# Patient Record
Sex: FEMALE | Race: WHITE | NOT HISPANIC OR LATINO | URBAN - METROPOLITAN AREA
[De-identification: names, ages, dates, MRNs, and addresses within clinical notes are randomized per-mention and may not be internally consistent; named-entity substitution may affect disease eponyms.]

---

## 2017-01-15 ENCOUNTER — INPATIENT (INPATIENT)
Facility: HOSPITAL | Age: 82
LOS: 2 days | Discharge: NURSING, REHAB OR LONG-TERM CARE | DRG: 470 | End: 2017-01-18
Attending: INTERNAL MEDICINE | Admitting: INTERNAL MEDICINE
Payer: MEDICARE

## 2017-01-15 ENCOUNTER — RESULT REVIEW (OUTPATIENT)
Age: 82
End: 2017-01-15

## 2017-01-15 VITALS
HEART RATE: 67 BPM | HEIGHT: 64 IN | RESPIRATION RATE: 16 BRPM | TEMPERATURE: 98 F | SYSTOLIC BLOOD PRESSURE: 155 MMHG | DIASTOLIC BLOOD PRESSURE: 58 MMHG | WEIGHT: 209.44 LBS | OXYGEN SATURATION: 96 %

## 2017-01-15 DIAGNOSIS — M84.459A PATHOLOGICAL FRACTURE, HIP, UNSPECIFIED, INITIAL ENCOUNTER FOR FRACTURE: ICD-10-CM

## 2017-01-15 DIAGNOSIS — S72.001A FRACTURE OF UNSPECIFIED PART OF NECK OF RIGHT FEMUR, INITIAL ENCOUNTER FOR CLOSED FRACTURE: ICD-10-CM

## 2017-01-15 DIAGNOSIS — E03.9 HYPOTHYROIDISM, UNSPECIFIED: ICD-10-CM

## 2017-01-15 DIAGNOSIS — K21.9 GASTRO-ESOPHAGEAL REFLUX DISEASE WITHOUT ESOPHAGITIS: ICD-10-CM

## 2017-01-15 DIAGNOSIS — M10.9 GOUT, UNSPECIFIED: ICD-10-CM

## 2017-01-15 DIAGNOSIS — K42.9 UMBILICAL HERNIA WITHOUT OBSTRUCTION OR GANGRENE: Chronic | ICD-10-CM

## 2017-01-15 DIAGNOSIS — E78.5 HYPERLIPIDEMIA, UNSPECIFIED: ICD-10-CM

## 2017-01-15 DIAGNOSIS — E11.8 TYPE 2 DIABETES MELLITUS WITH UNSPECIFIED COMPLICATIONS: ICD-10-CM

## 2017-01-15 DIAGNOSIS — I10 ESSENTIAL (PRIMARY) HYPERTENSION: ICD-10-CM

## 2017-01-15 DIAGNOSIS — Z41.8 ENCOUNTER FOR OTHER PROCEDURES FOR PURPOSES OTHER THAN REMEDYING HEALTH STATE: ICD-10-CM

## 2017-01-15 PROCEDURE — 73502 X-RAY EXAM HIP UNI 2-3 VIEWS: CPT | Mod: 26,RT

## 2017-01-15 PROCEDURE — 99285 EMERGENCY DEPT VISIT HI MDM: CPT

## 2017-01-15 PROCEDURE — 93010 ELECTROCARDIOGRAM REPORT: CPT

## 2017-01-15 PROCEDURE — 71020: CPT | Mod: 26

## 2017-01-15 RX ORDER — DEXTROSE 50 % IN WATER 50 %
25 SYRINGE (ML) INTRAVENOUS ONCE
Qty: 0 | Refills: 0 | Status: DISCONTINUED | OUTPATIENT
Start: 2017-01-15 | End: 2017-01-16

## 2017-01-15 RX ORDER — MORPHINE SULFATE 50 MG/1
4 CAPSULE, EXTENDED RELEASE ORAL EVERY 4 HOURS
Qty: 0 | Refills: 0 | Status: DISCONTINUED | OUTPATIENT
Start: 2017-01-15 | End: 2017-01-16

## 2017-01-15 RX ORDER — ENOXAPARIN SODIUM 100 MG/ML
40 INJECTION SUBCUTANEOUS ONCE
Qty: 0 | Refills: 0 | Status: COMPLETED | OUTPATIENT
Start: 2017-01-15 | End: 2017-01-15

## 2017-01-15 RX ORDER — ALLOPURINOL 300 MG
100 TABLET ORAL
Qty: 0 | Refills: 0 | Status: DISCONTINUED | OUTPATIENT
Start: 2017-01-15 | End: 2017-01-16

## 2017-01-15 RX ORDER — INSULIN LISPRO 100/ML
VIAL (ML) SUBCUTANEOUS AT BEDTIME
Qty: 0 | Refills: 0 | Status: DISCONTINUED | OUTPATIENT
Start: 2017-01-15 | End: 2017-01-16

## 2017-01-15 RX ORDER — GLUCAGON INJECTION, SOLUTION 0.5 MG/.1ML
1 INJECTION, SOLUTION SUBCUTANEOUS ONCE
Qty: 0 | Refills: 0 | Status: DISCONTINUED | OUTPATIENT
Start: 2017-01-15 | End: 2017-01-16

## 2017-01-15 RX ORDER — CARVEDILOL PHOSPHATE 80 MG/1
6.25 CAPSULE, EXTENDED RELEASE ORAL EVERY 12 HOURS
Qty: 0 | Refills: 0 | Status: DISCONTINUED | OUTPATIENT
Start: 2017-01-15 | End: 2017-01-16

## 2017-01-15 RX ORDER — INSULIN LISPRO 100/ML
VIAL (ML) SUBCUTANEOUS
Qty: 0 | Refills: 0 | Status: DISCONTINUED | OUTPATIENT
Start: 2017-01-15 | End: 2017-01-16

## 2017-01-15 RX ORDER — ALBUTEROL 90 UG/1
2.5 AEROSOL, METERED ORAL EVERY 6 HOURS
Qty: 0 | Refills: 0 | Status: DISCONTINUED | OUTPATIENT
Start: 2017-01-15 | End: 2017-01-16

## 2017-01-15 RX ORDER — INSULIN LISPRO 100/ML
5 VIAL (ML) SUBCUTANEOUS
Qty: 0 | Refills: 0 | Status: DISCONTINUED | OUTPATIENT
Start: 2017-01-15 | End: 2017-01-16

## 2017-01-15 RX ORDER — MORPHINE SULFATE 50 MG/1
2 CAPSULE, EXTENDED RELEASE ORAL EVERY 4 HOURS
Qty: 0 | Refills: 0 | Status: DISCONTINUED | OUTPATIENT
Start: 2017-01-15 | End: 2017-01-16

## 2017-01-15 RX ORDER — SODIUM CHLORIDE 9 MG/ML
1000 INJECTION, SOLUTION INTRAVENOUS
Qty: 0 | Refills: 0 | Status: DISCONTINUED | OUTPATIENT
Start: 2017-01-15 | End: 2017-01-16

## 2017-01-15 RX ORDER — CHOLECALCIFEROL (VITAMIN D3) 125 MCG
1000 CAPSULE ORAL DAILY
Qty: 0 | Refills: 0 | Status: DISCONTINUED | OUTPATIENT
Start: 2017-01-15 | End: 2017-01-16

## 2017-01-15 RX ORDER — AMITRIPTYLINE HCL 25 MG
25 TABLET ORAL AT BEDTIME
Qty: 0 | Refills: 0 | Status: DISCONTINUED | OUTPATIENT
Start: 2017-01-15 | End: 2017-01-16

## 2017-01-15 RX ORDER — LISINOPRIL 2.5 MG/1
20 TABLET ORAL DAILY
Qty: 0 | Refills: 0 | Status: DISCONTINUED | OUTPATIENT
Start: 2017-01-15 | End: 2017-01-16

## 2017-01-15 RX ORDER — DEXTROSE 50 % IN WATER 50 %
1 SYRINGE (ML) INTRAVENOUS ONCE
Qty: 0 | Refills: 0 | Status: DISCONTINUED | OUTPATIENT
Start: 2017-01-15 | End: 2017-01-16

## 2017-01-15 RX ORDER — DEXTROSE 50 % IN WATER 50 %
12.5 SYRINGE (ML) INTRAVENOUS ONCE
Qty: 0 | Refills: 0 | Status: DISCONTINUED | OUTPATIENT
Start: 2017-01-15 | End: 2017-01-16

## 2017-01-15 RX ORDER — PANTOPRAZOLE SODIUM 20 MG/1
40 TABLET, DELAYED RELEASE ORAL
Qty: 0 | Refills: 0 | Status: DISCONTINUED | OUTPATIENT
Start: 2017-01-15 | End: 2017-01-16

## 2017-01-15 RX ORDER — ENOXAPARIN SODIUM 100 MG/ML
0 INJECTION SUBCUTANEOUS
Qty: 0 | Refills: 0 | COMMUNITY

## 2017-01-15 RX ORDER — LEVOTHYROXINE SODIUM 125 MCG
50 TABLET ORAL DAILY
Qty: 0 | Refills: 0 | Status: DISCONTINUED | OUTPATIENT
Start: 2017-01-15 | End: 2017-01-16

## 2017-01-15 RX ORDER — ACETAMINOPHEN 500 MG
650 TABLET ORAL EVERY 6 HOURS
Qty: 0 | Refills: 0 | Status: DISCONTINUED | OUTPATIENT
Start: 2017-01-15 | End: 2017-01-16

## 2017-01-15 RX ORDER — ATORVASTATIN CALCIUM 80 MG/1
40 TABLET, FILM COATED ORAL AT BEDTIME
Qty: 0 | Refills: 0 | Status: DISCONTINUED | OUTPATIENT
Start: 2017-01-15 | End: 2017-01-16

## 2017-01-15 RX ORDER — INSULIN GLARGINE 100 [IU]/ML
36 INJECTION, SOLUTION SUBCUTANEOUS AT BEDTIME
Qty: 0 | Refills: 0 | Status: DISCONTINUED | OUTPATIENT
Start: 2017-01-15 | End: 2017-01-16

## 2017-01-15 RX ORDER — DILTIAZEM HCL 120 MG
180 CAPSULE, EXT RELEASE 24 HR ORAL DAILY
Qty: 0 | Refills: 0 | Status: DISCONTINUED | OUTPATIENT
Start: 2017-01-15 | End: 2017-01-16

## 2017-01-15 RX ORDER — ALBUTEROL 90 UG/1
1 AEROSOL, METERED ORAL EVERY 4 HOURS
Qty: 0 | Refills: 0 | Status: DISCONTINUED | OUTPATIENT
Start: 2017-01-15 | End: 2017-01-16

## 2017-01-15 RX ADMIN — Medication 25 MILLIGRAM(S): at 23:09

## 2017-01-15 RX ADMIN — MORPHINE SULFATE 4 MILLIGRAM(S): 50 CAPSULE, EXTENDED RELEASE ORAL at 22:07

## 2017-01-15 RX ADMIN — MORPHINE SULFATE 2 MILLIGRAM(S): 50 CAPSULE, EXTENDED RELEASE ORAL at 18:23

## 2017-01-15 RX ADMIN — Medication 100 MILLIGRAM(S): at 22:18

## 2017-01-15 RX ADMIN — ATORVASTATIN CALCIUM 40 MILLIGRAM(S): 80 TABLET, FILM COATED ORAL at 23:10

## 2017-01-15 RX ADMIN — CARVEDILOL PHOSPHATE 6.25 MILLIGRAM(S): 80 CAPSULE, EXTENDED RELEASE ORAL at 23:10

## 2017-01-15 RX ADMIN — ENOXAPARIN SODIUM 40 MILLIGRAM(S): 100 INJECTION SUBCUTANEOUS at 18:23

## 2017-01-15 RX ADMIN — PANTOPRAZOLE SODIUM 40 MILLIGRAM(S): 20 TABLET, DELAYED RELEASE ORAL at 22:18

## 2017-01-15 RX ADMIN — MORPHINE SULFATE 2 MILLIGRAM(S): 50 CAPSULE, EXTENDED RELEASE ORAL at 18:38

## 2017-01-15 RX ADMIN — MORPHINE SULFATE 4 MILLIGRAM(S): 50 CAPSULE, EXTENDED RELEASE ORAL at 23:07

## 2017-01-15 NOTE — H&P ADULT. - ASSESSMENT
89-year-old with diabetes mellitus, hypertension, dyslipidemia, GERD, gout and hypothyroidism, who now presents with fall and injury to the right hip.  Patient has a right hip fracture.  Patient is being admitted for further management.

## 2017-01-15 NOTE — H&P ADULT. - PROBLEM SELECTOR PLAN 1
Admit to medicine.  Morphine for pain control.  Orthopedic consultation requested.  Patient will most likely need the open reduction internal fixation.  Will give Lovenox for DVT prophylaxis ×1.  Further management as per patient clinical course.

## 2017-01-15 NOTE — H&P ADULT. - RS GEN PE MLT RESP DETAILS PC
no subcutaneous emphysema/breath sounds equal/airway patent/diminished breath sounds, L/no chest wall tenderness/no rhonchi/respirations non-labored/clear to auscultation bilaterally/no intercostal retractions/no rales/diminished breath sounds, R

## 2017-01-15 NOTE — H&P ADULT. - PROBLEM SELECTOR PLAN 2
Will continue patient on Cardizem, catapres, lisinopril and Coreg with holding parameters.  Monitor blood pressure per protocol.

## 2017-01-15 NOTE — H&P ADULT. - PMH
Diabetes    Gastroesophageal reflux disease, esophagitis presence not specified    Gout    Hyperlipidemia, unspecified hyperlipidemia type    Hypertension    Hypothyroidism, unspecified type

## 2017-01-15 NOTE — H&P ADULT. - HISTORY OF PRESENT ILLNESS
89-year-old female with history of diabetes mellitus, hypertension, dyslipidemia, GERD, gout and hypothyroidism who was at her great-grandson's birthday party.  Patient tripped on a rug and fell.  She sustained an injury to her right hip.  Patient was brought into the emergency room with complains of right hip pain.  Patient is found to have right hip fracture.  She is being admitted to the hospital for further management.      At the time of my evaluation,  patient is complaining of right hip pain.    Denies any chest pain or chest pressure.    Denies any nausea or vomiting.    Denies any fevers or chills.    Denies any recent history of congestive heart failure, MI or angina. 89-year-old female with history of diabetes mellitus, hypertension, dyslipidemia, GERD, gout and hypothyroidism who was at her great-grandson's birthday party.  Patient tripped on a rug and fell.  She sustained an injury to her right hip.  Patient was brought into the emergency room with complains of right hip pain.  Patient is found to have right hip fracture.  She is being admitted to the hospital for further management.      At the time of my evaluation,  patient is complaining of right hip pain.    No Head injury or LOC.   Denies any chest pain or chest pressure.    Denies any nausea or vomiting.    Denies any fevers or chills.    Denies any recent history of congestive heart failure, MI or angina.

## 2017-01-15 NOTE — H&P ADULT. - PROBLEM SELECTOR PLAN 3
Will decrease patient's Lantus dose this time.  Will place on pre-meal Humalog coverage.  Will hold metformin for now.  Hypoglycemia protocol.  Check A1c.

## 2017-01-15 NOTE — H&P ADULT. - GASTROINTESTINAL DETAILS
no guarding/no rebound tenderness/nontender/bowel sounds normal/no distention/no organomegaly/no masses palpable/no rigidity/no bruit/soft

## 2017-01-15 NOTE — H&P ADULT. - NEGATIVE CARDIOVASCULAR SYMPTOMS
no paroxysmal nocturnal dyspnea/no palpitations/no orthopnea/no chest pain/no claudication/no dyspnea on exertion/no peripheral edema

## 2017-01-16 LAB
ANION GAP SERPL CALC-SCNC: 10 MMOL/L — SIGNIFICANT CHANGE UP (ref 5–17)
ANION GAP SERPL CALC-SCNC: 9 MMOL/L — SIGNIFICANT CHANGE UP (ref 5–17)
BASOPHILS # BLD AUTO: 0.1 K/UL — SIGNIFICANT CHANGE UP (ref 0–0.2)
BASOPHILS NFR BLD AUTO: 1.2 % — SIGNIFICANT CHANGE UP (ref 0–2)
BUN SERPL-MCNC: 15 MG/DL — SIGNIFICANT CHANGE UP (ref 7–23)
BUN SERPL-MCNC: 16 MG/DL — SIGNIFICANT CHANGE UP (ref 7–23)
CALCIUM SERPL-MCNC: 8.1 MG/DL — LOW (ref 8.4–10.5)
CALCIUM SERPL-MCNC: 8.4 MG/DL — SIGNIFICANT CHANGE UP (ref 8.4–10.5)
CHLORIDE SERPL-SCNC: 101 MMOL/L — SIGNIFICANT CHANGE UP (ref 96–108)
CHLORIDE SERPL-SCNC: 102 MMOL/L — SIGNIFICANT CHANGE UP (ref 96–108)
CHOLEST SERPL-MCNC: 123 MG/DL — SIGNIFICANT CHANGE UP (ref 10–199)
CO2 SERPL-SCNC: 24 MMOL/L — SIGNIFICANT CHANGE UP (ref 22–31)
CO2 SERPL-SCNC: 27 MMOL/L — SIGNIFICANT CHANGE UP (ref 22–31)
CREAT SERPL-MCNC: 0.95 MG/DL — SIGNIFICANT CHANGE UP (ref 0.5–1.3)
CREAT SERPL-MCNC: 0.98 MG/DL — SIGNIFICANT CHANGE UP (ref 0.5–1.3)
EOSINOPHIL # BLD AUTO: 0.9 K/UL — HIGH (ref 0–0.5)
EOSINOPHIL NFR BLD AUTO: 10.3 % — HIGH (ref 0–6)
GLUCOSE SERPL-MCNC: 166 MG/DL — HIGH (ref 70–99)
GLUCOSE SERPL-MCNC: 95 MG/DL — SIGNIFICANT CHANGE UP (ref 70–99)
HBA1C BLD-MCNC: 6.5 % — HIGH (ref 4–5.6)
HCT VFR BLD CALC: 35.4 % — SIGNIFICANT CHANGE UP (ref 34.5–45)
HCT VFR BLD CALC: 36.9 % — SIGNIFICANT CHANGE UP (ref 34.5–45)
HDLC SERPL-MCNC: 40 MG/DL — SIGNIFICANT CHANGE UP (ref 40–125)
HGB BLD-MCNC: 11.2 G/DL — LOW (ref 11.5–15.5)
HGB BLD-MCNC: 11.4 G/DL — LOW (ref 11.5–15.5)
LIPID PNL WITH DIRECT LDL SERPL: 61 MG/DL — SIGNIFICANT CHANGE UP
LYMPHOCYTES # BLD AUTO: 1.7 K/UL — SIGNIFICANT CHANGE UP (ref 1–3.3)
LYMPHOCYTES # BLD AUTO: 18.9 % — SIGNIFICANT CHANGE UP (ref 13–44)
MAGNESIUM SERPL-MCNC: 1.1 MG/DL — LOW (ref 1.6–2.6)
MCHC RBC-ENTMCNC: 27.1 PG — SIGNIFICANT CHANGE UP (ref 27–34)
MCHC RBC-ENTMCNC: 27.1 PG — SIGNIFICANT CHANGE UP (ref 27–34)
MCHC RBC-ENTMCNC: 31 GM/DL — LOW (ref 32–36)
MCHC RBC-ENTMCNC: 31.5 GM/DL — LOW (ref 32–36)
MCV RBC AUTO: 86 FL — SIGNIFICANT CHANGE UP (ref 80–100)
MCV RBC AUTO: 87.3 FL — SIGNIFICANT CHANGE UP (ref 80–100)
MONOCYTES # BLD AUTO: 0.7 K/UL — SIGNIFICANT CHANGE UP (ref 0–0.9)
MONOCYTES NFR BLD AUTO: 7.5 % — SIGNIFICANT CHANGE UP (ref 2–14)
NEUTROPHILS # BLD AUTO: 5.6 K/UL — SIGNIFICANT CHANGE UP (ref 1.8–7.4)
NEUTROPHILS NFR BLD AUTO: 62.2 % — SIGNIFICANT CHANGE UP (ref 43–77)
PHOSPHATE SERPL-MCNC: 3.6 MG/DL — SIGNIFICANT CHANGE UP (ref 2.5–4.5)
PLATELET # BLD AUTO: 216 K/UL — SIGNIFICANT CHANGE UP (ref 150–400)
PLATELET # BLD AUTO: 221 K/UL — SIGNIFICANT CHANGE UP (ref 150–400)
POTASSIUM SERPL-MCNC: 3.8 MMOL/L — SIGNIFICANT CHANGE UP (ref 3.5–5.3)
POTASSIUM SERPL-MCNC: 4 MMOL/L — SIGNIFICANT CHANGE UP (ref 3.5–5.3)
POTASSIUM SERPL-SCNC: 3.8 MMOL/L — SIGNIFICANT CHANGE UP (ref 3.5–5.3)
POTASSIUM SERPL-SCNC: 4 MMOL/L — SIGNIFICANT CHANGE UP (ref 3.5–5.3)
RBC # BLD: 4.12 M/UL — SIGNIFICANT CHANGE UP (ref 3.8–5.2)
RBC # BLD: 4.23 M/UL — SIGNIFICANT CHANGE UP (ref 3.8–5.2)
RBC # FLD: 15.2 % — HIGH (ref 10.3–14.5)
RBC # FLD: 15.5 % — HIGH (ref 10.3–14.5)
SODIUM SERPL-SCNC: 135 MMOL/L — SIGNIFICANT CHANGE UP (ref 135–145)
SODIUM SERPL-SCNC: 138 MMOL/L — SIGNIFICANT CHANGE UP (ref 135–145)
T3 SERPL-MCNC: 90 NG/DL — SIGNIFICANT CHANGE UP (ref 80–200)
T4 AB SER-ACNC: 6.7 UG/DL — SIGNIFICANT CHANGE UP (ref 4.6–12)
TOTAL CHOLESTEROL/HDL RATIO MEASUREMENT: 3.1 RATIO — LOW (ref 3.3–7.1)
TRIGL SERPL-MCNC: 108 MG/DL — SIGNIFICANT CHANGE UP (ref 10–149)
TSH SERPL-MCNC: 6.75 UIU/ML — HIGH (ref 0.27–4.2)
WBC # BLD: 11.4 K/UL — HIGH (ref 3.8–10.5)
WBC # BLD: 8.9 K/UL — SIGNIFICANT CHANGE UP (ref 3.8–10.5)
WBC # FLD AUTO: 11.4 K/UL — HIGH (ref 3.8–10.5)
WBC # FLD AUTO: 8.9 K/UL — SIGNIFICANT CHANGE UP (ref 3.8–10.5)

## 2017-01-16 PROCEDURE — 99232 SBSQ HOSP IP/OBS MODERATE 35: CPT

## 2017-01-16 PROCEDURE — 88311 DECALCIFY TISSUE: CPT | Mod: 26

## 2017-01-16 PROCEDURE — 73501 X-RAY EXAM HIP UNI 1 VIEW: CPT | Mod: 26,RT

## 2017-01-16 PROCEDURE — 88305 TISSUE EXAM BY PATHOLOGIST: CPT | Mod: 26

## 2017-01-16 PROCEDURE — 93306 TTE W/DOPPLER COMPLETE: CPT | Mod: 26

## 2017-01-16 PROCEDURE — 93010 ELECTROCARDIOGRAM REPORT: CPT

## 2017-01-16 RX ORDER — DILTIAZEM HCL 120 MG
180 CAPSULE, EXT RELEASE 24 HR ORAL DAILY
Qty: 0 | Refills: 0 | Status: DISCONTINUED | OUTPATIENT
Start: 2017-01-16 | End: 2017-01-18

## 2017-01-16 RX ORDER — ENOXAPARIN SODIUM 100 MG/ML
40 INJECTION SUBCUTANEOUS EVERY 24 HOURS
Qty: 0 | Refills: 0 | Status: DISCONTINUED | OUTPATIENT
Start: 2017-01-17 | End: 2017-01-18

## 2017-01-16 RX ORDER — SODIUM CHLORIDE 9 MG/ML
1000 INJECTION, SOLUTION INTRAVENOUS
Qty: 0 | Refills: 0 | Status: DISCONTINUED | OUTPATIENT
Start: 2017-01-16 | End: 2017-01-16

## 2017-01-16 RX ORDER — MAGNESIUM SULFATE 500 MG/ML
2 VIAL (ML) INJECTION ONCE
Qty: 0 | Refills: 0 | Status: COMPLETED | OUTPATIENT
Start: 2017-01-16 | End: 2017-01-16

## 2017-01-16 RX ORDER — CHOLECALCIFEROL (VITAMIN D3) 125 MCG
1000 CAPSULE ORAL DAILY
Qty: 0 | Refills: 0 | Status: DISCONTINUED | OUTPATIENT
Start: 2017-01-16 | End: 2017-01-18

## 2017-01-16 RX ORDER — CEFAZOLIN SODIUM 1 G
2000 VIAL (EA) INJECTION EVERY 8 HOURS
Qty: 0 | Refills: 0 | Status: COMPLETED | OUTPATIENT
Start: 2017-01-16 | End: 2017-01-17

## 2017-01-16 RX ORDER — ACETAMINOPHEN 500 MG
750 TABLET ORAL EVERY 8 HOURS
Qty: 0 | Refills: 0 | Status: COMPLETED | OUTPATIENT
Start: 2017-01-17 | End: 2017-01-17

## 2017-01-16 RX ORDER — DEXTROSE 50 % IN WATER 50 %
12.5 SYRINGE (ML) INTRAVENOUS ONCE
Qty: 0 | Refills: 0 | Status: DISCONTINUED | OUTPATIENT
Start: 2017-01-16 | End: 2017-01-18

## 2017-01-16 RX ORDER — AMITRIPTYLINE HCL 25 MG
25 TABLET ORAL AT BEDTIME
Qty: 0 | Refills: 0 | Status: DISCONTINUED | OUTPATIENT
Start: 2017-01-16 | End: 2017-01-18

## 2017-01-16 RX ORDER — ACETAMINOPHEN 500 MG
750 TABLET ORAL ONCE
Qty: 0 | Refills: 0 | Status: COMPLETED | OUTPATIENT
Start: 2017-01-16 | End: 2017-01-16

## 2017-01-16 RX ORDER — CARVEDILOL PHOSPHATE 80 MG/1
6.25 CAPSULE, EXTENDED RELEASE ORAL EVERY 12 HOURS
Qty: 0 | Refills: 0 | Status: DISCONTINUED | OUTPATIENT
Start: 2017-01-16 | End: 2017-01-18

## 2017-01-16 RX ORDER — HYDROMORPHONE HYDROCHLORIDE 2 MG/ML
0.5 INJECTION INTRAMUSCULAR; INTRAVENOUS; SUBCUTANEOUS
Qty: 0 | Refills: 0 | Status: DISCONTINUED | OUTPATIENT
Start: 2017-01-16 | End: 2017-01-18

## 2017-01-16 RX ORDER — INSULIN GLARGINE 100 [IU]/ML
36 INJECTION, SOLUTION SUBCUTANEOUS AT BEDTIME
Qty: 0 | Refills: 0 | Status: DISCONTINUED | OUTPATIENT
Start: 2017-01-16 | End: 2017-01-18

## 2017-01-16 RX ORDER — DEXTROSE 50 % IN WATER 50 %
25 SYRINGE (ML) INTRAVENOUS ONCE
Qty: 0 | Refills: 0 | Status: DISCONTINUED | OUTPATIENT
Start: 2017-01-16 | End: 2017-01-18

## 2017-01-16 RX ORDER — ACETAMINOPHEN 500 MG
650 TABLET ORAL EVERY 8 HOURS
Qty: 0 | Refills: 0 | Status: DISCONTINUED | OUTPATIENT
Start: 2017-01-17 | End: 2017-01-18

## 2017-01-16 RX ORDER — GLUCAGON INJECTION, SOLUTION 0.5 MG/.1ML
1 INJECTION, SOLUTION SUBCUTANEOUS ONCE
Qty: 0 | Refills: 0 | Status: DISCONTINUED | OUTPATIENT
Start: 2017-01-16 | End: 2017-01-18

## 2017-01-16 RX ORDER — INSULIN LISPRO 100/ML
VIAL (ML) SUBCUTANEOUS
Qty: 0 | Refills: 0 | Status: DISCONTINUED | OUTPATIENT
Start: 2017-01-16 | End: 2017-01-18

## 2017-01-16 RX ORDER — SODIUM CHLORIDE 9 MG/ML
1000 INJECTION INTRAMUSCULAR; INTRAVENOUS; SUBCUTANEOUS
Qty: 0 | Refills: 0 | Status: DISCONTINUED | OUTPATIENT
Start: 2017-01-16 | End: 2017-01-16

## 2017-01-16 RX ORDER — ONDANSETRON 8 MG/1
4 TABLET, FILM COATED ORAL EVERY 6 HOURS
Qty: 0 | Refills: 0 | Status: DISCONTINUED | OUTPATIENT
Start: 2017-01-16 | End: 2017-01-16

## 2017-01-16 RX ORDER — ALBUTEROL 90 UG/1
1 AEROSOL, METERED ORAL EVERY 4 HOURS
Qty: 0 | Refills: 0 | Status: DISCONTINUED | OUTPATIENT
Start: 2017-01-16 | End: 2017-01-18

## 2017-01-16 RX ORDER — DEXTROSE 50 % IN WATER 50 %
1 SYRINGE (ML) INTRAVENOUS ONCE
Qty: 0 | Refills: 0 | Status: DISCONTINUED | OUTPATIENT
Start: 2017-01-16 | End: 2017-01-18

## 2017-01-16 RX ORDER — ALLOPURINOL 300 MG
100 TABLET ORAL
Qty: 0 | Refills: 0 | Status: DISCONTINUED | OUTPATIENT
Start: 2017-01-16 | End: 2017-01-18

## 2017-01-16 RX ORDER — ONDANSETRON 8 MG/1
4 TABLET, FILM COATED ORAL ONCE
Qty: 0 | Refills: 0 | Status: DISCONTINUED | OUTPATIENT
Start: 2017-01-16 | End: 2017-01-16

## 2017-01-16 RX ORDER — PANTOPRAZOLE SODIUM 20 MG/1
40 TABLET, DELAYED RELEASE ORAL
Qty: 0 | Refills: 0 | Status: DISCONTINUED | OUTPATIENT
Start: 2017-01-16 | End: 2017-01-18

## 2017-01-16 RX ORDER — OXYCODONE HYDROCHLORIDE 5 MG/1
5 TABLET ORAL
Qty: 0 | Refills: 0 | Status: DISCONTINUED | OUTPATIENT
Start: 2017-01-16 | End: 2017-01-18

## 2017-01-16 RX ORDER — ATORVASTATIN CALCIUM 80 MG/1
40 TABLET, FILM COATED ORAL AT BEDTIME
Qty: 0 | Refills: 0 | Status: DISCONTINUED | OUTPATIENT
Start: 2017-01-16 | End: 2017-01-18

## 2017-01-16 RX ORDER — ONDANSETRON 8 MG/1
4 TABLET, FILM COATED ORAL ONCE
Qty: 0 | Refills: 0 | Status: COMPLETED | OUTPATIENT
Start: 2017-01-16 | End: 2017-01-16

## 2017-01-16 RX ORDER — ONDANSETRON 8 MG/1
4 TABLET, FILM COATED ORAL EVERY 6 HOURS
Qty: 0 | Refills: 0 | Status: DISCONTINUED | OUTPATIENT
Start: 2017-01-16 | End: 2017-01-18

## 2017-01-16 RX ORDER — INSULIN LISPRO 100/ML
5 VIAL (ML) SUBCUTANEOUS
Qty: 0 | Refills: 0 | Status: DISCONTINUED | OUTPATIENT
Start: 2017-01-16 | End: 2017-01-18

## 2017-01-16 RX ORDER — LEVOTHYROXINE SODIUM 125 MCG
50 TABLET ORAL DAILY
Qty: 0 | Refills: 0 | Status: DISCONTINUED | OUTPATIENT
Start: 2017-01-16 | End: 2017-01-18

## 2017-01-16 RX ORDER — SODIUM CHLORIDE 9 MG/ML
1000 INJECTION, SOLUTION INTRAVENOUS
Qty: 0 | Refills: 0 | Status: DISCONTINUED | OUTPATIENT
Start: 2017-01-16 | End: 2017-01-17

## 2017-01-16 RX ORDER — INSULIN LISPRO 100/ML
VIAL (ML) SUBCUTANEOUS AT BEDTIME
Qty: 0 | Refills: 0 | Status: DISCONTINUED | OUTPATIENT
Start: 2017-01-16 | End: 2017-01-18

## 2017-01-16 RX ORDER — OXYCODONE HYDROCHLORIDE 5 MG/1
10 TABLET ORAL
Qty: 0 | Refills: 0 | Status: DISCONTINUED | OUTPATIENT
Start: 2017-01-16 | End: 2017-01-18

## 2017-01-16 RX ORDER — HYDROMORPHONE HYDROCHLORIDE 2 MG/ML
0.25 INJECTION INTRAMUSCULAR; INTRAVENOUS; SUBCUTANEOUS
Qty: 0 | Refills: 0 | Status: DISCONTINUED | OUTPATIENT
Start: 2017-01-16 | End: 2017-01-16

## 2017-01-16 RX ORDER — ACETAMINOPHEN 500 MG
650 TABLET ORAL EVERY 6 HOURS
Qty: 0 | Refills: 0 | Status: DISCONTINUED | OUTPATIENT
Start: 2017-01-16 | End: 2017-01-16

## 2017-01-16 RX ORDER — LISINOPRIL 2.5 MG/1
20 TABLET ORAL DAILY
Qty: 0 | Refills: 0 | Status: DISCONTINUED | OUTPATIENT
Start: 2017-01-16 | End: 2017-01-18

## 2017-01-16 RX ADMIN — Medication 300 MILLIGRAM(S): at 16:47

## 2017-01-16 RX ADMIN — MORPHINE SULFATE 2 MILLIGRAM(S): 50 CAPSULE, EXTENDED RELEASE ORAL at 08:52

## 2017-01-16 RX ADMIN — MORPHINE SULFATE 2 MILLIGRAM(S): 50 CAPSULE, EXTENDED RELEASE ORAL at 09:08

## 2017-01-16 RX ADMIN — ATORVASTATIN CALCIUM 40 MILLIGRAM(S): 80 TABLET, FILM COATED ORAL at 22:58

## 2017-01-16 RX ADMIN — SODIUM CHLORIDE 50 MILLILITER(S): 9 INJECTION INTRAMUSCULAR; INTRAVENOUS; SUBCUTANEOUS at 10:41

## 2017-01-16 RX ADMIN — Medication 650 MILLIGRAM(S): at 04:02

## 2017-01-16 RX ADMIN — Medication 50 GRAM(S): at 10:38

## 2017-01-16 RX ADMIN — Medication 300 MILLIGRAM(S): at 23:57

## 2017-01-16 RX ADMIN — MORPHINE SULFATE 2 MILLIGRAM(S): 50 CAPSULE, EXTENDED RELEASE ORAL at 01:01

## 2017-01-16 RX ADMIN — MORPHINE SULFATE 4 MILLIGRAM(S): 50 CAPSULE, EXTENDED RELEASE ORAL at 07:07

## 2017-01-16 RX ADMIN — SODIUM CHLORIDE 100 MILLILITER(S): 9 INJECTION, SOLUTION INTRAVENOUS at 18:22

## 2017-01-16 RX ADMIN — CARVEDILOL PHOSPHATE 6.25 MILLIGRAM(S): 80 CAPSULE, EXTENDED RELEASE ORAL at 04:21

## 2017-01-16 RX ADMIN — ONDANSETRON 4 MILLIGRAM(S): 8 TABLET, FILM COATED ORAL at 10:26

## 2017-01-16 RX ADMIN — ONDANSETRON 4 MILLIGRAM(S): 8 TABLET, FILM COATED ORAL at 16:47

## 2017-01-16 RX ADMIN — Medication 50 MICROGRAM(S): at 04:21

## 2017-01-16 RX ADMIN — Medication 650 MILLIGRAM(S): at 03:58

## 2017-01-16 RX ADMIN — MORPHINE SULFATE 4 MILLIGRAM(S): 50 CAPSULE, EXTENDED RELEASE ORAL at 02:01

## 2017-01-16 RX ADMIN — Medication 25 MILLIGRAM(S): at 22:58

## 2017-01-16 RX ADMIN — Medication 100 MILLIGRAM(S): at 21:57

## 2017-01-16 RX ADMIN — LISINOPRIL 20 MILLIGRAM(S): 2.5 TABLET ORAL at 04:20

## 2017-01-16 RX ADMIN — CARVEDILOL PHOSPHATE 6.25 MILLIGRAM(S): 80 CAPSULE, EXTENDED RELEASE ORAL at 18:13

## 2017-01-16 RX ADMIN — Medication 180 MILLIGRAM(S): at 18:14

## 2017-01-16 RX ADMIN — MORPHINE SULFATE 2 MILLIGRAM(S): 50 CAPSULE, EXTENDED RELEASE ORAL at 02:01

## 2017-01-16 RX ADMIN — PANTOPRAZOLE SODIUM 40 MILLIGRAM(S): 20 TABLET, DELAYED RELEASE ORAL at 04:20

## 2017-01-16 RX ADMIN — MORPHINE SULFATE 4 MILLIGRAM(S): 50 CAPSULE, EXTENDED RELEASE ORAL at 02:16

## 2017-01-16 RX ADMIN — MORPHINE SULFATE 4 MILLIGRAM(S): 50 CAPSULE, EXTENDED RELEASE ORAL at 06:05

## 2017-01-16 RX ADMIN — HYDROMORPHONE HYDROCHLORIDE 0.25 MILLIGRAM(S): 2 INJECTION INTRAMUSCULAR; INTRAVENOUS; SUBCUTANEOUS at 16:28

## 2017-01-16 RX ADMIN — Medication 750 MILLIGRAM(S): at 16:58

## 2017-01-16 RX ADMIN — HYDROMORPHONE HYDROCHLORIDE 0.25 MILLIGRAM(S): 2 INJECTION INTRAMUSCULAR; INTRAVENOUS; SUBCUTANEOUS at 16:48

## 2017-01-16 RX ADMIN — SODIUM CHLORIDE 50 MILLILITER(S): 9 INJECTION, SOLUTION INTRAVENOUS at 16:29

## 2017-01-16 RX ADMIN — Medication 180 MILLIGRAM(S): at 04:20

## 2017-01-17 LAB
ANION GAP SERPL CALC-SCNC: 8 MMOL/L — SIGNIFICANT CHANGE UP (ref 5–17)
BASOPHILS # BLD AUTO: 0 K/UL — SIGNIFICANT CHANGE UP (ref 0–0.2)
BASOPHILS NFR BLD AUTO: 0.3 % — SIGNIFICANT CHANGE UP (ref 0–2)
BUN SERPL-MCNC: 18 MG/DL — SIGNIFICANT CHANGE UP (ref 7–23)
CALCIUM SERPL-MCNC: 8.2 MG/DL — LOW (ref 8.4–10.5)
CHLORIDE SERPL-SCNC: 100 MMOL/L — SIGNIFICANT CHANGE UP (ref 96–108)
CO2 SERPL-SCNC: 26 MMOL/L — SIGNIFICANT CHANGE UP (ref 22–31)
CREAT SERPL-MCNC: 1.01 MG/DL — SIGNIFICANT CHANGE UP (ref 0.5–1.3)
EOSINOPHIL # BLD AUTO: 0 K/UL — SIGNIFICANT CHANGE UP (ref 0–0.5)
EOSINOPHIL NFR BLD AUTO: 0 % — SIGNIFICANT CHANGE UP (ref 0–6)
GLUCOSE SERPL-MCNC: 195 MG/DL — HIGH (ref 70–99)
HCT VFR BLD CALC: 29.5 % — LOW (ref 34.5–45)
HGB BLD-MCNC: 9.7 G/DL — LOW (ref 11.5–15.5)
LYMPHOCYTES # BLD AUTO: 0.5 K/UL — LOW (ref 1–3.3)
LYMPHOCYTES # BLD AUTO: 6.6 % — LOW (ref 13–44)
MAGNESIUM SERPL-MCNC: 1.4 MG/DL — LOW (ref 1.6–2.6)
MCHC RBC-ENTMCNC: 27.6 PG — SIGNIFICANT CHANGE UP (ref 27–34)
MCHC RBC-ENTMCNC: 32.7 GM/DL — SIGNIFICANT CHANGE UP (ref 32–36)
MCV RBC AUTO: 84.5 FL — SIGNIFICANT CHANGE UP (ref 80–100)
MONOCYTES # BLD AUTO: 0.3 K/UL — SIGNIFICANT CHANGE UP (ref 0–0.9)
MONOCYTES NFR BLD AUTO: 4.2 % — SIGNIFICANT CHANGE UP (ref 2–14)
NEUTROPHILS # BLD AUTO: 7 K/UL — SIGNIFICANT CHANGE UP (ref 1.8–7.4)
NEUTROPHILS NFR BLD AUTO: 88.9 % — HIGH (ref 43–77)
PLATELET # BLD AUTO: 177 K/UL — SIGNIFICANT CHANGE UP (ref 150–400)
POTASSIUM SERPL-MCNC: 4.2 MMOL/L — SIGNIFICANT CHANGE UP (ref 3.5–5.3)
POTASSIUM SERPL-SCNC: 4.2 MMOL/L — SIGNIFICANT CHANGE UP (ref 3.5–5.3)
RBC # BLD: 3.5 M/UL — LOW (ref 3.8–5.2)
RBC # FLD: 15.4 % — HIGH (ref 10.3–14.5)
SODIUM SERPL-SCNC: 134 MMOL/L — LOW (ref 135–145)
WBC # BLD: 7.9 K/UL — SIGNIFICANT CHANGE UP (ref 3.8–10.5)
WBC # FLD AUTO: 7.9 K/UL — SIGNIFICANT CHANGE UP (ref 3.8–10.5)

## 2017-01-17 PROCEDURE — 99232 SBSQ HOSP IP/OBS MODERATE 35: CPT

## 2017-01-17 RX ORDER — OXYCODONE HYDROCHLORIDE 5 MG/1
1 TABLET ORAL
Qty: 0 | Refills: 0 | COMMUNITY
Start: 2017-01-17

## 2017-01-17 RX ORDER — ENOXAPARIN SODIUM 100 MG/ML
40 INJECTION SUBCUTANEOUS
Qty: 0 | Refills: 0 | COMMUNITY
Start: 2017-01-17 | End: 2017-02-06

## 2017-01-17 RX ORDER — MAGNESIUM OXIDE 400 MG ORAL TABLET 241.3 MG
400 TABLET ORAL
Qty: 0 | Refills: 0 | Status: DISCONTINUED | OUTPATIENT
Start: 2017-01-17 | End: 2017-01-18

## 2017-01-17 RX ORDER — MAGNESIUM SULFATE 500 MG/ML
2 VIAL (ML) INJECTION ONCE
Qty: 0 | Refills: 0 | Status: COMPLETED | OUTPATIENT
Start: 2017-01-17 | End: 2017-01-17

## 2017-01-17 RX ADMIN — Medication 100 MILLIGRAM(S): at 07:57

## 2017-01-17 RX ADMIN — OXYCODONE HYDROCHLORIDE 10 MILLIGRAM(S): 5 TABLET ORAL at 13:10

## 2017-01-17 RX ADMIN — CARVEDILOL PHOSPHATE 6.25 MILLIGRAM(S): 80 CAPSULE, EXTENDED RELEASE ORAL at 05:34

## 2017-01-17 RX ADMIN — Medication 25 MILLIGRAM(S): at 22:16

## 2017-01-17 RX ADMIN — Medication 50 GRAM(S): at 12:43

## 2017-01-17 RX ADMIN — INSULIN GLARGINE 36 UNIT(S): 100 INJECTION, SOLUTION SUBCUTANEOUS at 22:16

## 2017-01-17 RX ADMIN — Medication 5 UNIT(S): at 12:21

## 2017-01-17 RX ADMIN — LISINOPRIL 20 MILLIGRAM(S): 2.5 TABLET ORAL at 05:34

## 2017-01-17 RX ADMIN — OXYCODONE HYDROCHLORIDE 5 MILLIGRAM(S): 5 TABLET ORAL at 07:57

## 2017-01-17 RX ADMIN — PANTOPRAZOLE SODIUM 40 MILLIGRAM(S): 20 TABLET, DELAYED RELEASE ORAL at 05:34

## 2017-01-17 RX ADMIN — Medication 5 UNIT(S): at 07:56

## 2017-01-17 RX ADMIN — MAGNESIUM OXIDE 400 MG ORAL TABLET 400 MILLIGRAM(S): 241.3 TABLET ORAL at 18:24

## 2017-01-17 RX ADMIN — OXYCODONE HYDROCHLORIDE 10 MILLIGRAM(S): 5 TABLET ORAL at 17:35

## 2017-01-17 RX ADMIN — Medication 750 MILLIGRAM(S): at 07:24

## 2017-01-17 RX ADMIN — Medication 1: at 07:57

## 2017-01-17 RX ADMIN — OXYCODONE HYDROCHLORIDE 5 MILLIGRAM(S): 5 TABLET ORAL at 08:35

## 2017-01-17 RX ADMIN — Medication 750 MILLIGRAM(S): at 00:11

## 2017-01-17 RX ADMIN — Medication 5 UNIT(S): at 16:51

## 2017-01-17 RX ADMIN — Medication 1 PATCH: at 12:26

## 2017-01-17 RX ADMIN — Medication 100 MILLIGRAM(S): at 05:55

## 2017-01-17 RX ADMIN — ENOXAPARIN SODIUM 40 MILLIGRAM(S): 100 INJECTION SUBCUTANEOUS at 09:00

## 2017-01-17 RX ADMIN — Medication 650 MILLIGRAM(S): at 16:52

## 2017-01-17 RX ADMIN — Medication 1000 UNIT(S): at 07:58

## 2017-01-17 RX ADMIN — OXYCODONE HYDROCHLORIDE 10 MILLIGRAM(S): 5 TABLET ORAL at 16:51

## 2017-01-17 RX ADMIN — Medication 3: at 12:21

## 2017-01-17 RX ADMIN — ATORVASTATIN CALCIUM 40 MILLIGRAM(S): 80 TABLET, FILM COATED ORAL at 22:16

## 2017-01-17 RX ADMIN — Medication 300 MILLIGRAM(S): at 07:20

## 2017-01-17 RX ADMIN — Medication 50 MICROGRAM(S): at 05:34

## 2017-01-17 RX ADMIN — Medication 3: at 16:50

## 2017-01-17 RX ADMIN — Medication 180 MILLIGRAM(S): at 05:34

## 2017-01-17 RX ADMIN — OXYCODONE HYDROCHLORIDE 10 MILLIGRAM(S): 5 TABLET ORAL at 12:32

## 2017-01-17 RX ADMIN — Medication 100 MILLIGRAM(S): at 16:51

## 2017-01-17 NOTE — PHYSICAL THERAPY INITIAL EVALUATION ADULT - ADDITIONAL COMMENTS
Pt owns a RW,Cane,Wheelchair. Has 3 steps to enter with 1 handrail , 1 flight of stairs with handrail to basement laundry room. Was independent with ambulation/ADLs. Daughter lives upstairs.

## 2017-01-17 NOTE — DIETITIAN INITIAL EVALUATION ADULT. - OTHER INFO
89F adm after fall and is s/p R hip sx. Reports feeling well at present time. On Con CHO c adequate intake- reports appetite is slightly decreased but is mindful to eat at mealtimes. Family also noted to bring in food for pt. Pt c hx T2DM (A1c 6.5%); discussed hypoglycemia protocol with pt who stated she drinks orange juice if her blood sugar drops too low (reports that sometimes she wakes up with her BG ~60mg/dl). On metformin and Lantus pta (noted lantus to be lowered, metformin d/c during adm). Wt stable. NKFA. Denies chewing/swallowing difficulties. Skin intact c surg incision.

## 2017-01-17 NOTE — DISCHARGE NOTE ADULT - PLAN OF CARE
Improve ambulation, ADLs and quality of life Physical Therapy /Occupational Therapy  Total Hip Protocol   - Ambulation  - Transfers   - Stairs   - ADLs (activities of daily living)    Posterior Total Hip Replacement precautions for 4 weeks  - Abduction pillow   - High hip chair for sitting  - No internal rotation,   - No crossing legs   - No sleeping on opposite sides  - Ice packs to hip following Physical Therapy   Ice packs to hip for 30 min. every 3 hours and after physical therapy.   Keep incision clean and dry. May shower 5 days after surgery if no drainage from incision.  Prineo tape/suture removal on/near after Post Op Day # 14 in rehab facility / Surgeon's office. Hyponatremia/Anemia Obtain BMP and CBC in 3 days  Follow up with PCP upon discharge from rehab facility DM Adjust Lantus if necessary

## 2017-01-17 NOTE — DISCHARGE NOTE ADULT - HOSPITAL COURSE
89-year-old female with history of diabetes mellitus, hypertension, dyslipidemia, GERD, gout and hypothyroidism who was at her great-grandson's birthday party on 1/15/2016.  Patient tripped on a rug and fell.  She sustained an injury to her right hip.  Patient was brought into the emergency room with complains of right hip pain.  Patient is found to have right hip fracture.  She is being admitted to the hospital for further management.  (orthopedics) was consulted for surgical management. Medical clearance was obtained and Right hip hemiarthroplasty performed on 1/16/2017. Procedure tolerated well. No operative or malvin-operative complications arose during patients hospital course.  Patient received antibiotic according to SCIP guidelines for infection prevention.  Lovenox was given for DVT prophylaxis.  Anesthesia, Medical Hospitalist, Physical Therapy and Occupational Therapy were consulted. Patient is stable for discharge with a good prognosis.  Appropriate discharge instructions and medications are provided in this document.

## 2017-01-17 NOTE — DISCHARGE NOTE ADULT - NS AS ACTIVITY OBS
Do not make important decisions/Walking-Indoors allowed/No Heavy lifting/straining/Walking-Outdoors allowed

## 2017-01-17 NOTE — OCCUPATIONAL THERAPY INITIAL EVALUATION ADULT - ADDITIONAL COMMENTS
Pt lives in a mother daughter house alone on first floor, daughter lives on 2nd floor. Pt has 2 MARGRET + railing and a flight + railing to basement to laundry room. + Tub. Pt reports she can borrow a RW, SAC, w/c.   *slight [place]*

## 2017-01-17 NOTE — PHYSICAL THERAPY INITIAL EVALUATION ADULT - MANUAL MUSCLE TESTING RESULTS, REHAB EVAL
grossly assessed due to/SX. Bilat UE and Left LE 5/5. Right hip NT due to sx, right quads 4/5 , right EHL 4/5.

## 2017-01-17 NOTE — DISCHARGE NOTE ADULT - MEDICATION SUMMARY - MEDICATIONS TO TAKE
I will START or STAY ON the medications listed below when I get home from the hospital:    acetaminophen 500 mg oral tablet  -- 1 tab(s) by mouth every 12 hours  -- Indication: For Pain    oxyCODONE 5 mg oral tablet  -- 1 tab(s) by mouth every 3 hours, As needed, Mild Pain  -- Indication: For Pain    oxyCODONE 10 mg oral tablet  -- 1 tab(s) by mouth every 3 hours, As needed, Moderate Pain  -- Indication: For Pain    benazepril 20 mg oral tablet  -- 1 tab(s) by mouth once a day  -- Indication: For HTN    cloNIDine 0.3 mg/24 hr transdermal film, extended release  -- 1 patch by mouth once a week  -- Indication: For CAD    Diltiazem Hydrochloride  mg/24 hours oral capsule, extended release  -- 1 cap(s) by mouth once a day  -- Indication: For HTN    enoxaparin  -- 40 milligram(s) subcutaneous once a day  -- Indication: For DVT prophylaxis    amitriptyline 25 mg oral tablet  -- 1 tab(s) by mouth once a day (at bedtime)  -- Indication: For antidepressant    Lantus Solostar Pen 100 units/mL subcutaneous solution  -- 24 unit(s) subcutaneous once a day in the am  -- Indication: For DM    Lantus Solostar Pen 100 units/mL subcutaneous solution  -- 46  subcutaneous once a day (at bedtime)  -- Indication: For DM    metFORMIN 500 mg oral tablet, extended release  -- 1 tab(s) by mouth 2 times a day  -- Indication: For DM    Colcrys 0.6 mg oral tablet  --  by mouth   -- Indication: For Gout    allopurinol 100 mg oral tablet  -- 2 tab(s) by mouth once a day  -- Indication: For Gout    atorvastatin 40 mg oral tablet  -- 1 tab(s) by mouth once a day (at bedtime)  -- Indication: For HLD    carvedilol 12.5 mg oral tablet  -- 0.5 tab(s) by mouth 2 times a day  -- Indication: For CAD    albuterol 90 mcg/inh inhalation aerosol  -- 1 puff(s) inhaled every 4 hours, As needed, Shortness of Breath and/or Wheezing  -- Indication: For wheezing/SOB    sodium chloride 1 g oral tablet  -- 1 tab(s) by mouth 2 times a dayx3 days  -- Indication: For Hyponatremia     omeprazole 20 mg oral delayed release capsule  -- 1 cap(s) by mouth once a day  -- Indication: For GERD    Qvar 40 mcg/inh inhalation aerosol  -- 1 puff(s) inhaled 2 times a day  -- Indication: For asthma    levothyroxine 50 mcg (0.05 mg) oral tablet  -- 1 tab(s) by mouth once a day  -- Indication: For Hypothyrodism    Vitamin D3 1000 intl units oral capsule  -- 1 cap(s) by mouth once a day  -- Indication: For vit I will START or STAY ON the medications listed below when I get home from the hospital:    acetaminophen 500 mg oral tablet  -- 1 tab(s) by mouth every 12 hours  -- Indication: For Pain    oxyCODONE 5 mg oral tablet  -- 1 tab(s) by mouth every 3 hours, As needed, Mild Pain  -- Indication: For Pain    oxyCODONE 10 mg oral tablet  -- 1 tab(s) by mouth every 3 hours, As needed, Moderate Pain  -- Indication: For Pain    benazepril 20 mg oral tablet  -- 1 tab(s) by mouth once a day  -- Indication: For HTN    cloNIDine 0.3 mg/24 hr transdermal film, extended release  -- 1 patch by mouth once a week  -- Indication: For CAD    Diltiazem Hydrochloride  mg/24 hours oral capsule, extended release  -- 1 cap(s) by mouth once a day  -- Indication: For HTN    enoxaparin  -- 40 milligram(s) subcutaneous once a day  -- Indication: For DVT prophylaxis    amitriptyline 25 mg oral tablet  -- 1 tab(s) by mouth once a day (at bedtime)  -- Indication: For antidepressant    Lantus Solostar Pen 100 units/mL subcutaneous solution  -- 24 unit(s) subcutaneous once a day in the am  -- Indication: For DM    metFORMIN 500 mg oral tablet, extended release  -- 1 tab(s) by mouth 2 times a day  -- Indication: For DM    Lantus Solostar Pen 100 units/mL subcutaneous solution  -- 24  subcutaneous once a day (at bedtime)  -- Indication: For DM    Colcrys 0.6 mg oral tablet  --  by mouth   -- Indication: For Gout    allopurinol 100 mg oral tablet  -- 2 tab(s) by mouth once a day  -- Indication: For Gout    atorvastatin 40 mg oral tablet  -- 1 tab(s) by mouth once a day (at bedtime)  -- Indication: For HLD    carvedilol 12.5 mg oral tablet  -- 0.5 tab(s) by mouth 2 times a day  -- Indication: For CAD    albuterol 90 mcg/inh inhalation aerosol  -- 1 puff(s) inhaled every 4 hours, As needed, Shortness of Breath and/or Wheezing  -- Indication: For wheezing/SOB    sodium chloride 1 g oral tablet  -- 1 tab(s) by mouth 2 times a dayx3 days  -- Indication: For Hyponatremia     omeprazole 20 mg oral delayed release capsule  -- 1 cap(s) by mouth once a day  -- Indication: For GERD    Qvar 40 mcg/inh inhalation aerosol  -- 1 puff(s) inhaled 2 times a day  -- Indication: For asthma    levothyroxine 50 mcg (0.05 mg) oral tablet  -- 1 tab(s) by mouth once a day  -- Indication: For Hypothyrodism    Vitamin D3 1000 intl units oral capsule  -- 1 cap(s) by mouth once a day  -- Indication: For vit

## 2017-01-17 NOTE — DISCHARGE NOTE ADULT - CARE PLAN
Principal Discharge DX:	Closed fracture of right hip, initial encounter  Goal:	Improve ambulation, ADLs and quality of life  Instructions for follow-up, activity and diet:	Physical Therapy /Occupational Therapy  Total Hip Protocol   - Ambulation  - Transfers   - Stairs   - ADLs (activities of daily living)    Posterior Total Hip Replacement precautions for 4 weeks  - Abduction pillow   - High hip chair for sitting  - No internal rotation,   - No crossing legs   - No sleeping on opposite sides  - Ice packs to hip following Physical Therapy   Ice packs to hip for 30 min. every 3 hours and after physical therapy.   Keep incision clean and dry. May shower 5 days after surgery if no drainage from incision.  Prineo tape/suture removal on/near after Post Op Day # 14 in rehab facility / Surgeon's office. Principal Discharge DX:	Closed fracture of right hip, initial encounter  Goal:	Improve ambulation, ADLs and quality of life  Instructions for follow-up, activity and diet:	Physical Therapy /Occupational Therapy  Total Hip Protocol   - Ambulation  - Transfers   - Stairs   - ADLs (activities of daily living)    Posterior Total Hip Replacement precautions for 4 weeks  - Abduction pillow   - High hip chair for sitting  - No internal rotation,   - No crossing legs   - No sleeping on opposite sides  - Ice packs to hip following Physical Therapy   Ice packs to hip for 30 min. every 3 hours and after physical therapy.   Keep incision clean and dry. May shower 5 days after surgery if no drainage from incision.  Prineo tape/suture removal on/near after Post Op Day # 14 in rehab facility / Surgeon's office.  Goal:	Hyponatremia/Anemia  Instructions for follow-up, activity and diet:	Obtain BMP and CBC in 3 days  Follow up with PCP upon discharge from rehab facility  Goal:	DM  Instructions for follow-up, activity and diet:	Adjust Lantus if necessary

## 2017-01-17 NOTE — DISCHARGE NOTE ADULT - PATIENT PORTAL LINK FT
“You can access the Follow[employer] Patient Portal, offered by [facility], by registering with the following website: [email or web address]”

## 2017-01-17 NOTE — DISCHARGE NOTE ADULT - CARE PROVIDER_API CALL
Aldo Garcia), Orthopaedic Surgery  [address] [place] [address]  Phone: [phone]  Fax: [phone]

## 2017-01-17 NOTE — OCCUPATIONAL THERAPY INITIAL EVALUATION ADULT - NS ASR FOLLOW COMMAND OT EVAL
100% of the time Pt educated regarding fall prevention in hospital and recommendation to use call bell/ask for assistance with all ADL's/transfers etc./100% of the time

## 2017-01-17 NOTE — PHYSICAL THERAPY INITIAL EVALUATION ADULT - ACTIVE RANGE OF MOTION EXAMINATION, REHAB EVAL
deena. upper extremity Active ROM was WNL (within normal limits)/LLE Active ROM was WNL (within normal limits)/right Hip limited due to sx. right knee and ankle WNL./deficits as listed below

## 2017-01-17 NOTE — DISCHARGE NOTE ADULT - MEDICATION SUMMARY - MEDICATIONS TO CHANGE
I will SWITCH the dose or number of times a day I take the medications listed below when I get home from the hospital:  None I will SWITCH the dose or number of times a day I take the medications listed below when I get home from the hospital:    Lantus Solostar Pen 100 units/mL subcutaneous solution  --  subcutaneous once a day (at bedtime) varying dose based on blood glucose

## 2017-01-17 NOTE — DISCHARGE NOTE ADULT - NS MD DC FALL RISK RISK
For information on Fall & Injury Prevention, visit [email or web address]

## 2017-01-18 VITALS
OXYGEN SATURATION: 97 % | SYSTOLIC BLOOD PRESSURE: 139 MMHG | RESPIRATION RATE: 17 BRPM | DIASTOLIC BLOOD PRESSURE: 63 MMHG | HEART RATE: 61 BPM | TEMPERATURE: 98 F

## 2017-01-18 LAB
ANION GAP SERPL CALC-SCNC: 6 MMOL/L — SIGNIFICANT CHANGE UP (ref 5–17)
BASOPHILS # BLD AUTO: 0 K/UL — SIGNIFICANT CHANGE UP (ref 0–0.2)
BASOPHILS NFR BLD AUTO: 0.3 % — SIGNIFICANT CHANGE UP (ref 0–2)
BUN SERPL-MCNC: 25 MG/DL — HIGH (ref 7–23)
CALCIUM SERPL-MCNC: 8.3 MG/DL — LOW (ref 8.4–10.5)
CHLORIDE SERPL-SCNC: 99 MMOL/L — SIGNIFICANT CHANGE UP (ref 96–108)
CO2 SERPL-SCNC: 26 MMOL/L — SIGNIFICANT CHANGE UP (ref 22–31)
CREAT SERPL-MCNC: 1.12 MG/DL — SIGNIFICANT CHANGE UP (ref 0.5–1.3)
EOSINOPHIL # BLD AUTO: 0.1 K/UL — SIGNIFICANT CHANGE UP (ref 0–0.5)
EOSINOPHIL NFR BLD AUTO: 0.9 % — SIGNIFICANT CHANGE UP (ref 0–6)
GLUCOSE SERPL-MCNC: 256 MG/DL — HIGH (ref 70–99)
HCT VFR BLD CALC: 27.7 % — LOW (ref 34.5–45)
HGB BLD-MCNC: 8.9 G/DL — LOW (ref 11.5–15.5)
LYMPHOCYTES # BLD AUTO: 0.8 K/UL — LOW (ref 1–3.3)
LYMPHOCYTES # BLD AUTO: 11.1 % — LOW (ref 13–44)
MAGNESIUM SERPL-MCNC: 2.1 MG/DL — SIGNIFICANT CHANGE UP (ref 1.6–2.6)
MCHC RBC-ENTMCNC: 28 PG — SIGNIFICANT CHANGE UP (ref 27–34)
MCHC RBC-ENTMCNC: 32.3 GM/DL — SIGNIFICANT CHANGE UP (ref 32–36)
MCV RBC AUTO: 86.6 FL — SIGNIFICANT CHANGE UP (ref 80–100)
MONOCYTES # BLD AUTO: 0.7 K/UL — SIGNIFICANT CHANGE UP (ref 0–0.9)
MONOCYTES NFR BLD AUTO: 9.6 % — SIGNIFICANT CHANGE UP (ref 2–14)
NEUTROPHILS # BLD AUTO: 5.8 K/UL — SIGNIFICANT CHANGE UP (ref 1.8–7.4)
NEUTROPHILS NFR BLD AUTO: 78.1 % — HIGH (ref 43–77)
PLATELET # BLD AUTO: 186 K/UL — SIGNIFICANT CHANGE UP (ref 150–400)
POTASSIUM SERPL-MCNC: 4.7 MMOL/L — SIGNIFICANT CHANGE UP (ref 3.5–5.3)
POTASSIUM SERPL-SCNC: 4.7 MMOL/L — SIGNIFICANT CHANGE UP (ref 3.5–5.3)
RBC # BLD: 3.2 M/UL — LOW (ref 3.8–5.2)
RBC # FLD: 15.3 % — HIGH (ref 10.3–14.5)
SODIUM SERPL-SCNC: 131 MMOL/L — LOW (ref 135–145)
WBC # BLD: 7.5 K/UL — SIGNIFICANT CHANGE UP (ref 3.8–10.5)
WBC # FLD AUTO: 7.5 K/UL — SIGNIFICANT CHANGE UP (ref 3.8–10.5)

## 2017-01-18 PROCEDURE — 85027 COMPLETE CBC AUTOMATED: CPT

## 2017-01-18 PROCEDURE — 73502 X-RAY EXAM HIP UNI 2-3 VIEWS: CPT

## 2017-01-18 PROCEDURE — 97535 SELF CARE MNGMENT TRAINING: CPT

## 2017-01-18 PROCEDURE — 84443 ASSAY THYROID STIM HORMONE: CPT

## 2017-01-18 PROCEDURE — 97001: CPT

## 2017-01-18 PROCEDURE — 85610 PROTHROMBIN TIME: CPT

## 2017-01-18 PROCEDURE — 99285 EMERGENCY DEPT VISIT HI MDM: CPT

## 2017-01-18 PROCEDURE — 83735 ASSAY OF MAGNESIUM: CPT

## 2017-01-18 PROCEDURE — 85730 THROMBOPLASTIN TIME PARTIAL: CPT

## 2017-01-18 PROCEDURE — 93005 ELECTROCARDIOGRAM TRACING: CPT

## 2017-01-18 PROCEDURE — 83036 HEMOGLOBIN GLYCOSYLATED A1C: CPT

## 2017-01-18 PROCEDURE — 80061 LIPID PANEL: CPT

## 2017-01-18 PROCEDURE — 84100 ASSAY OF PHOSPHORUS: CPT

## 2017-01-18 PROCEDURE — 86901 BLOOD TYPING SEROLOGIC RH(D): CPT

## 2017-01-18 PROCEDURE — 93306 TTE W/DOPPLER COMPLETE: CPT

## 2017-01-18 PROCEDURE — 36415 COLL VENOUS BLD VENIPUNCTURE: CPT

## 2017-01-18 PROCEDURE — 73501 X-RAY EXAM HIP UNI 1 VIEW: CPT

## 2017-01-18 PROCEDURE — 97161 PT EVAL LOW COMPLEX 20 MIN: CPT

## 2017-01-18 PROCEDURE — 81001 URINALYSIS AUTO W/SCOPE: CPT

## 2017-01-18 PROCEDURE — 86850 RBC ANTIBODY SCREEN: CPT

## 2017-01-18 PROCEDURE — 84436 ASSAY OF TOTAL THYROXINE: CPT

## 2017-01-18 PROCEDURE — 84480 ASSAY TRIIODOTHYRONINE (T3): CPT

## 2017-01-18 PROCEDURE — 80048 BASIC METABOLIC PNL TOTAL CA: CPT

## 2017-01-18 PROCEDURE — 97116 GAIT TRAINING THERAPY: CPT

## 2017-01-18 PROCEDURE — 96374 THER/PROPH/DIAG INJ IV PUSH: CPT

## 2017-01-18 PROCEDURE — 88305 TISSUE EXAM BY PATHOLOGIST: CPT

## 2017-01-18 PROCEDURE — 88311 DECALCIFY TISSUE: CPT

## 2017-01-18 PROCEDURE — 71046 X-RAY EXAM CHEST 2 VIEWS: CPT

## 2017-01-18 PROCEDURE — 97003: CPT

## 2017-01-18 PROCEDURE — 97165 OT EVAL LOW COMPLEX 30 MIN: CPT

## 2017-01-18 PROCEDURE — 51701 INSERT BLADDER CATHETER: CPT

## 2017-01-18 PROCEDURE — 96375 TX/PRO/DX INJ NEW DRUG ADDON: CPT

## 2017-01-18 PROCEDURE — 94664 DEMO&/EVAL PT USE INHALER: CPT

## 2017-01-18 PROCEDURE — 86900 BLOOD TYPING SEROLOGIC ABO: CPT

## 2017-01-18 PROCEDURE — C1889: CPT

## 2017-01-18 PROCEDURE — 97110 THERAPEUTIC EXERCISES: CPT

## 2017-01-18 PROCEDURE — 80053 COMPREHEN METABOLIC PANEL: CPT

## 2017-01-18 RX ORDER — ENOXAPARIN SODIUM 100 MG/ML
0 INJECTION SUBCUTANEOUS
Qty: 0 | Refills: 0 | COMMUNITY

## 2017-01-18 RX ORDER — ENOXAPARIN SODIUM 100 MG/ML
46 INJECTION SUBCUTANEOUS
Qty: 0 | Refills: 0 | COMMUNITY

## 2017-01-18 RX ORDER — SODIUM CHLORIDE 9 MG/ML
500 INJECTION INTRAMUSCULAR; INTRAVENOUS; SUBCUTANEOUS ONCE
Qty: 0 | Refills: 0 | Status: COMPLETED | OUTPATIENT
Start: 2017-01-18 | End: 2017-01-18

## 2017-01-18 RX ORDER — ALBUTEROL 90 UG/1
1 AEROSOL, METERED ORAL
Qty: 0 | Refills: 0 | DISCHARGE
Start: 2017-01-18

## 2017-01-18 RX ADMIN — LISINOPRIL 20 MILLIGRAM(S): 2.5 TABLET ORAL at 05:36

## 2017-01-18 RX ADMIN — Medication 5 UNIT(S): at 08:31

## 2017-01-18 RX ADMIN — Medication 50 MICROGRAM(S): at 05:36

## 2017-01-18 RX ADMIN — OXYCODONE HYDROCHLORIDE 10 MILLIGRAM(S): 5 TABLET ORAL at 09:30

## 2017-01-18 RX ADMIN — Medication 100 MILLIGRAM(S): at 09:57

## 2017-01-18 RX ADMIN — Medication 650 MILLIGRAM(S): at 08:32

## 2017-01-18 RX ADMIN — Medication 5 UNIT(S): at 12:38

## 2017-01-18 RX ADMIN — Medication 2: at 08:30

## 2017-01-18 RX ADMIN — OXYCODONE HYDROCHLORIDE 10 MILLIGRAM(S): 5 TABLET ORAL at 15:26

## 2017-01-18 RX ADMIN — CARVEDILOL PHOSPHATE 6.25 MILLIGRAM(S): 80 CAPSULE, EXTENDED RELEASE ORAL at 05:36

## 2017-01-18 RX ADMIN — OXYCODONE HYDROCHLORIDE 10 MILLIGRAM(S): 5 TABLET ORAL at 06:21

## 2017-01-18 RX ADMIN — OXYCODONE HYDROCHLORIDE 10 MILLIGRAM(S): 5 TABLET ORAL at 05:39

## 2017-01-18 RX ADMIN — Medication 180 MILLIGRAM(S): at 05:36

## 2017-01-18 RX ADMIN — OXYCODONE HYDROCHLORIDE 10 MILLIGRAM(S): 5 TABLET ORAL at 08:42

## 2017-01-18 RX ADMIN — OXYCODONE HYDROCHLORIDE 10 MILLIGRAM(S): 5 TABLET ORAL at 16:16

## 2017-01-18 RX ADMIN — PANTOPRAZOLE SODIUM 40 MILLIGRAM(S): 20 TABLET, DELAYED RELEASE ORAL at 05:36

## 2017-01-18 RX ADMIN — Medication 1000 UNIT(S): at 13:39

## 2017-01-18 RX ADMIN — Medication: at 12:39

## 2017-01-18 RX ADMIN — SODIUM CHLORIDE 1 MILLILITER(S): 9 INJECTION INTRAMUSCULAR; INTRAVENOUS; SUBCUTANEOUS at 11:20

## 2017-01-18 RX ADMIN — Medication 650 MILLIGRAM(S): at 09:18

## 2017-01-18 RX ADMIN — MAGNESIUM OXIDE 400 MG ORAL TABLET 400 MILLIGRAM(S): 241.3 TABLET ORAL at 08:31

## 2017-01-18 RX ADMIN — ENOXAPARIN SODIUM 40 MILLIGRAM(S): 100 INJECTION SUBCUTANEOUS at 09:57

## 2017-04-09 ENCOUNTER — FORM ENCOUNTER (OUTPATIENT)
Age: 82
End: 2017-04-09

## 2017-04-10 ENCOUNTER — OUTPATIENT (OUTPATIENT)
Dept: OUTPATIENT SERVICES | Facility: HOSPITAL | Age: 82
LOS: 1 days | End: 2017-04-10
Payer: MEDICARE

## 2017-04-10 ENCOUNTER — APPOINTMENT (OUTPATIENT)
Dept: ULTRASOUND IMAGING | Facility: IMAGING CENTER | Age: 82
End: 2017-04-10

## 2017-04-10 DIAGNOSIS — M79.89 OTHER SPECIFIED SOFT TISSUE DISORDERS: ICD-10-CM

## 2017-04-10 DIAGNOSIS — K42.9 UMBILICAL HERNIA WITHOUT OBSTRUCTION OR GANGRENE: Chronic | ICD-10-CM

## 2017-04-10 PROBLEM — E03.9 HYPOTHYROIDISM, UNSPECIFIED: Chronic | Status: ACTIVE | Noted: 2017-01-15

## 2017-04-10 PROBLEM — K21.9 GASTRO-ESOPHAGEAL REFLUX DISEASE WITHOUT ESOPHAGITIS: Chronic | Status: ACTIVE | Noted: 2017-01-15

## 2017-04-10 PROBLEM — E78.5 HYPERLIPIDEMIA, UNSPECIFIED: Chronic | Status: ACTIVE | Noted: 2017-01-15

## 2017-04-10 PROBLEM — M10.9 GOUT, UNSPECIFIED: Chronic | Status: ACTIVE | Noted: 2017-01-15

## 2017-04-10 PROCEDURE — 93970 EXTREMITY STUDY: CPT

## 2017-04-23 ENCOUNTER — INPATIENT (INPATIENT)
Facility: HOSPITAL | Age: 82
LOS: 2 days | Discharge: HOME | End: 2017-04-26
Attending: HOSPITALIST | Admitting: HOSPITALIST
Payer: MEDICARE

## 2017-04-23 VITALS
DIASTOLIC BLOOD PRESSURE: 49 MMHG | SYSTOLIC BLOOD PRESSURE: 96 MMHG | RESPIRATION RATE: 18 BRPM | TEMPERATURE: 99 F | HEART RATE: 85 BPM | OXYGEN SATURATION: 99 %

## 2017-04-23 DIAGNOSIS — N17.9 ACUTE KIDNEY FAILURE, UNSPECIFIED: ICD-10-CM

## 2017-04-23 DIAGNOSIS — Z29.9 ENCOUNTER FOR PROPHYLACTIC MEASURES, UNSPECIFIED: ICD-10-CM

## 2017-04-23 DIAGNOSIS — E11.9 TYPE 2 DIABETES MELLITUS WITHOUT COMPLICATIONS: ICD-10-CM

## 2017-04-23 DIAGNOSIS — K42.9 UMBILICAL HERNIA WITHOUT OBSTRUCTION OR GANGRENE: Chronic | ICD-10-CM

## 2017-04-23 DIAGNOSIS — E87.1 HYPO-OSMOLALITY AND HYPONATREMIA: ICD-10-CM

## 2017-04-23 DIAGNOSIS — R60.0 LOCALIZED EDEMA: ICD-10-CM

## 2017-04-23 DIAGNOSIS — K21.9 GASTRO-ESOPHAGEAL REFLUX DISEASE WITHOUT ESOPHAGITIS: ICD-10-CM

## 2017-04-23 DIAGNOSIS — I10 ESSENTIAL (PRIMARY) HYPERTENSION: ICD-10-CM

## 2017-04-23 DIAGNOSIS — M10.00 IDIOPATHIC GOUT, UNSPECIFIED SITE: ICD-10-CM

## 2017-04-23 DIAGNOSIS — E78.5 HYPERLIPIDEMIA, UNSPECIFIED: ICD-10-CM

## 2017-04-23 DIAGNOSIS — E03.9 HYPOTHYROIDISM, UNSPECIFIED: ICD-10-CM

## 2017-04-23 LAB
ALBUMIN SERPL ELPH-MCNC: 3.6 G/DL — SIGNIFICANT CHANGE UP (ref 3.3–5)
ALP SERPL-CCNC: 84 U/L — SIGNIFICANT CHANGE UP (ref 40–120)
ALT FLD-CCNC: 19 U/L — SIGNIFICANT CHANGE UP (ref 4–33)
APPEARANCE UR: SIGNIFICANT CHANGE UP
AST SERPL-CCNC: 31 U/L — SIGNIFICANT CHANGE UP (ref 4–32)
BACTERIA # UR AUTO: SIGNIFICANT CHANGE UP
BASE EXCESS BLDV CALC-SCNC: 0 MMOL/L — SIGNIFICANT CHANGE UP
BASOPHILS # BLD AUTO: 0.04 K/UL — SIGNIFICANT CHANGE UP (ref 0–0.2)
BASOPHILS NFR BLD AUTO: 0.4 % — SIGNIFICANT CHANGE UP (ref 0–2)
BILIRUB SERPL-MCNC: 0.4 MG/DL — SIGNIFICANT CHANGE UP (ref 0.2–1.2)
BILIRUB UR-MCNC: NEGATIVE — SIGNIFICANT CHANGE UP
BLOOD GAS VENOUS - CREATININE: 3.93 MG/DL — HIGH (ref 0.5–1.3)
BLOOD UR QL VISUAL: NEGATIVE — SIGNIFICANT CHANGE UP
BUN SERPL-MCNC: 64 MG/DL — HIGH (ref 7–23)
CALCIUM SERPL-MCNC: 8.8 MG/DL — SIGNIFICANT CHANGE UP (ref 8.4–10.5)
CHLORIDE BLDV-SCNC: 101 MMOL/L — SIGNIFICANT CHANGE UP (ref 96–108)
CHLORIDE SERPL-SCNC: 92 MMOL/L — LOW (ref 98–107)
CHLORIDE UR-SCNC: 11 MMOL/L — SIGNIFICANT CHANGE UP
CO2 SERPL-SCNC: 22 MMOL/L — SIGNIFICANT CHANGE UP (ref 22–31)
COLOR SPEC: YELLOW — SIGNIFICANT CHANGE UP
CREAT ?TM UR-MCNC: 149.01 MG/DL — SIGNIFICANT CHANGE UP
CREAT SERPL-MCNC: 3.93 MG/DL — HIGH (ref 0.5–1.3)
EOSINOPHIL # BLD AUTO: 0.95 K/UL — HIGH (ref 0–0.5)
EOSINOPHIL NFR BLD AUTO: 9.9 % — HIGH (ref 0–6)
GAS PNL BLDV: 125 MMOL/L — LOW (ref 136–146)
GLUCOSE BLDV-MCNC: 142 — HIGH (ref 70–99)
GLUCOSE SERPL-MCNC: 147 MG/DL — HIGH (ref 70–99)
GLUCOSE UR-MCNC: NEGATIVE — SIGNIFICANT CHANGE UP
HCO3 BLDV-SCNC: 23 MMOL/L — SIGNIFICANT CHANGE UP (ref 20–27)
HCT VFR BLD CALC: 36.8 % — SIGNIFICANT CHANGE UP (ref 34.5–45)
HCT VFR BLDV CALC: 34.8 % — SIGNIFICANT CHANGE UP (ref 34.5–45)
HGB BLD-MCNC: 11.4 G/DL — LOW (ref 11.5–15.5)
HGB BLDV-MCNC: 11.3 G/DL — LOW (ref 11.5–15.5)
IMM GRANULOCYTES NFR BLD AUTO: 0.3 % — SIGNIFICANT CHANGE UP (ref 0–1.5)
KETONES UR-MCNC: SIGNIFICANT CHANGE UP
LACTATE BLDV-MCNC: 2 MMOL/L — SIGNIFICANT CHANGE UP (ref 0.5–2)
LEUKOCYTE ESTERASE UR-ACNC: HIGH
LYMPHOCYTES # BLD AUTO: 1.4 K/UL — SIGNIFICANT CHANGE UP (ref 1–3.3)
LYMPHOCYTES # BLD AUTO: 14.6 % — SIGNIFICANT CHANGE UP (ref 13–44)
MCHC RBC-ENTMCNC: 25.3 PG — LOW (ref 27–34)
MCHC RBC-ENTMCNC: 31 % — LOW (ref 32–36)
MCV RBC AUTO: 81.8 FL — SIGNIFICANT CHANGE UP (ref 80–100)
MONOCYTES # BLD AUTO: 0.57 K/UL — SIGNIFICANT CHANGE UP (ref 0–0.9)
MONOCYTES NFR BLD AUTO: 5.9 % — SIGNIFICANT CHANGE UP (ref 2–14)
MUCOUS THREADS # UR AUTO: SIGNIFICANT CHANGE UP
NEUTROPHILS # BLD AUTO: 6.62 K/UL — SIGNIFICANT CHANGE UP (ref 1.8–7.4)
NEUTROPHILS NFR BLD AUTO: 68.9 % — SIGNIFICANT CHANGE UP (ref 43–77)
NITRITE UR-MCNC: NEGATIVE — SIGNIFICANT CHANGE UP
NON-SQ EPI CELLS # UR AUTO: <1 — SIGNIFICANT CHANGE UP
PCO2 BLDV: 51 MMHG — SIGNIFICANT CHANGE UP (ref 41–51)
PH BLDV: 7.32 PH — SIGNIFICANT CHANGE UP (ref 7.32–7.43)
PH UR: 5.5 — SIGNIFICANT CHANGE UP (ref 4.6–8)
PLATELET # BLD AUTO: 267 K/UL — SIGNIFICANT CHANGE UP (ref 150–400)
PMV BLD: 10.3 FL — SIGNIFICANT CHANGE UP (ref 7–13)
PO2 BLDV: 26 MMHG — LOW (ref 35–40)
POTASSIUM BLDV-SCNC: 4.2 MMOL/L — SIGNIFICANT CHANGE UP (ref 3.4–4.5)
POTASSIUM SERPL-MCNC: 4.6 MMOL/L — SIGNIFICANT CHANGE UP (ref 3.5–5.3)
POTASSIUM SERPL-SCNC: 4.6 MMOL/L — SIGNIFICANT CHANGE UP (ref 3.5–5.3)
POTASSIUM UR-SCNC: 48.6 MEQ/L — SIGNIFICANT CHANGE UP
PROT SERPL-MCNC: 6.9 G/DL — SIGNIFICANT CHANGE UP (ref 6–8.3)
PROT UR-MCNC: 30 — HIGH
RBC # BLD: 4.5 M/UL — SIGNIFICANT CHANGE UP (ref 3.8–5.2)
RBC # FLD: 20.5 % — HIGH (ref 10.3–14.5)
RBC CASTS # UR COMP ASSIST: SIGNIFICANT CHANGE UP (ref 0–?)
SAO2 % BLDV: 37.1 % — LOW (ref 60–85)
SODIUM SERPL-SCNC: 133 MMOL/L — LOW (ref 135–145)
SODIUM UR-SCNC: 38 MEQ/L — SIGNIFICANT CHANGE UP
SP GR SPEC: 1.03 — SIGNIFICANT CHANGE UP (ref 1–1.03)
SQUAMOUS # UR AUTO: SIGNIFICANT CHANGE UP
UROBILINOGEN FLD QL: 4 E.U. — HIGH (ref 0.1–0.2)
WBC # BLD: 9.61 K/UL — SIGNIFICANT CHANGE UP (ref 3.8–10.5)
WBC # FLD AUTO: 9.61 K/UL — SIGNIFICANT CHANGE UP (ref 3.8–10.5)
WBC UR QL: HIGH (ref 0–?)

## 2017-04-23 PROCEDURE — 99223 1ST HOSP IP/OBS HIGH 75: CPT

## 2017-04-23 RX ORDER — SODIUM CHLORIDE 9 MG/ML
1 INJECTION INTRAMUSCULAR; INTRAVENOUS; SUBCUTANEOUS
Qty: 0 | Refills: 0 | COMMUNITY
End: 2017-01-21

## 2017-04-23 RX ORDER — AMITRIPTYLINE HCL 25 MG
1 TABLET ORAL
Qty: 0 | Refills: 0 | COMMUNITY

## 2017-04-23 RX ORDER — ACETAMINOPHEN 500 MG
1 TABLET ORAL
Qty: 0 | Refills: 0 | COMMUNITY

## 2017-04-23 RX ORDER — DEXTROSE 50 % IN WATER 50 %
1 SYRINGE (ML) INTRAVENOUS ONCE
Qty: 0 | Refills: 0 | Status: DISCONTINUED | OUTPATIENT
Start: 2017-04-23 | End: 2017-04-26

## 2017-04-23 RX ORDER — CARVEDILOL PHOSPHATE 80 MG/1
12.5 CAPSULE, EXTENDED RELEASE ORAL EVERY 12 HOURS
Qty: 0 | Refills: 0 | Status: DISCONTINUED | OUTPATIENT
Start: 2017-04-23 | End: 2017-04-23

## 2017-04-23 RX ORDER — CEPHALEXIN 500 MG
500 CAPSULE ORAL
Qty: 0 | Refills: 0 | Status: DISCONTINUED | OUTPATIENT
Start: 2017-04-23 | End: 2017-04-23

## 2017-04-23 RX ORDER — DEXTROSE 50 % IN WATER 50 %
12.5 SYRINGE (ML) INTRAVENOUS ONCE
Qty: 0 | Refills: 0 | Status: DISCONTINUED | OUTPATIENT
Start: 2017-04-23 | End: 2017-04-26

## 2017-04-23 RX ORDER — PANTOPRAZOLE SODIUM 20 MG/1
40 TABLET, DELAYED RELEASE ORAL
Qty: 0 | Refills: 0 | Status: DISCONTINUED | OUTPATIENT
Start: 2017-04-23 | End: 2017-04-26

## 2017-04-23 RX ORDER — IBUPROFEN 200 MG
200 TABLET ORAL ONCE
Qty: 0 | Refills: 0 | Status: COMPLETED | OUTPATIENT
Start: 2017-04-23 | End: 2017-04-23

## 2017-04-23 RX ORDER — BECLOMETHASONE DIPROPIONATE 40 UG/1
1 AEROSOL, METERED RESPIRATORY (INHALATION)
Qty: 0 | Refills: 0 | COMMUNITY

## 2017-04-23 RX ORDER — SODIUM CHLORIDE 9 MG/ML
1000 INJECTION, SOLUTION INTRAVENOUS
Qty: 0 | Refills: 0 | Status: DISCONTINUED | OUTPATIENT
Start: 2017-04-23 | End: 2017-04-26

## 2017-04-23 RX ORDER — INSULIN GLARGINE 100 [IU]/ML
24 INJECTION, SOLUTION SUBCUTANEOUS EVERY MORNING
Qty: 0 | Refills: 0 | Status: DISCONTINUED | OUTPATIENT
Start: 2017-04-24 | End: 2017-04-26

## 2017-04-23 RX ORDER — SODIUM CHLORIDE 9 MG/ML
1000 INJECTION INTRAMUSCULAR; INTRAVENOUS; SUBCUTANEOUS ONCE
Qty: 0 | Refills: 0 | Status: COMPLETED | OUTPATIENT
Start: 2017-04-23 | End: 2017-04-23

## 2017-04-23 RX ORDER — LEVOTHYROXINE SODIUM 125 MCG
50 TABLET ORAL DAILY
Qty: 0 | Refills: 0 | Status: DISCONTINUED | OUTPATIENT
Start: 2017-04-23 | End: 2017-04-26

## 2017-04-23 RX ORDER — INSULIN LISPRO 100/ML
VIAL (ML) SUBCUTANEOUS
Qty: 0 | Refills: 0 | Status: DISCONTINUED | OUTPATIENT
Start: 2017-04-23 | End: 2017-04-26

## 2017-04-23 RX ORDER — GLUCAGON INJECTION, SOLUTION 0.5 MG/.1ML
1 INJECTION, SOLUTION SUBCUTANEOUS ONCE
Qty: 0 | Refills: 0 | Status: DISCONTINUED | OUTPATIENT
Start: 2017-04-23 | End: 2017-04-26

## 2017-04-23 RX ORDER — SODIUM CHLORIDE 9 MG/ML
1000 INJECTION INTRAMUSCULAR; INTRAVENOUS; SUBCUTANEOUS
Qty: 0 | Refills: 0 | Status: DISCONTINUED | OUTPATIENT
Start: 2017-04-23 | End: 2017-04-24

## 2017-04-23 RX ORDER — CARVEDILOL PHOSPHATE 80 MG/1
12.5 CAPSULE, EXTENDED RELEASE ORAL EVERY 12 HOURS
Qty: 0 | Refills: 0 | Status: DISCONTINUED | OUTPATIENT
Start: 2017-04-23 | End: 2017-04-24

## 2017-04-23 RX ORDER — ATORVASTATIN CALCIUM 80 MG/1
40 TABLET, FILM COATED ORAL AT BEDTIME
Qty: 0 | Refills: 0 | Status: DISCONTINUED | OUTPATIENT
Start: 2017-04-23 | End: 2017-04-26

## 2017-04-23 RX ORDER — CEPHALEXIN 500 MG
250 CAPSULE ORAL DAILY
Qty: 0 | Refills: 0 | Status: DISCONTINUED | OUTPATIENT
Start: 2017-04-24 | End: 2017-04-26

## 2017-04-23 RX ADMIN — Medication 200 MILLIGRAM(S): at 23:46

## 2017-04-23 RX ADMIN — SODIUM CHLORIDE 2000 MILLILITER(S): 9 INJECTION INTRAMUSCULAR; INTRAVENOUS; SUBCUTANEOUS at 12:13

## 2017-04-23 RX ADMIN — SODIUM CHLORIDE 1000 MILLILITER(S): 9 INJECTION INTRAMUSCULAR; INTRAVENOUS; SUBCUTANEOUS at 16:33

## 2017-04-23 RX ADMIN — SODIUM CHLORIDE 2000 MILLILITER(S): 9 INJECTION INTRAMUSCULAR; INTRAVENOUS; SUBCUTANEOUS at 11:21

## 2017-04-23 RX ADMIN — CARVEDILOL PHOSPHATE 12.5 MILLIGRAM(S): 80 CAPSULE, EXTENDED RELEASE ORAL at 18:44

## 2017-04-23 RX ADMIN — ATORVASTATIN CALCIUM 40 MILLIGRAM(S): 80 TABLET, FILM COATED ORAL at 22:18

## 2017-04-23 RX ADMIN — SODIUM CHLORIDE 75 MILLILITER(S): 9 INJECTION INTRAMUSCULAR; INTRAVENOUS; SUBCUTANEOUS at 23:08

## 2017-04-23 NOTE — H&P ADULT. - HISTORY OF PRESENT ILLNESS
89 y/o F with DM2, HTN, HLD, GERD, gout, hypothyroidism, recent R hip hemiarthoplasty who presents for oliguria for 2 days.    Patient has noticed that for the past 2 days, she has been having significantly decreased urine output. She denies any dysuria, flank pain, N/V, diarrhea, constipation, fever, chills, CP, SOB. Of note,patient has been having increased LE edema since her hip surgery in January. Her PMD started her on lasix 40 BID and eplerenone on 4/11, to be taken for 10 days. This was in addition to her BP medication of spiranolactone/HCTZ.     In the ED, vitals were 98.9, 85, 96/49, 99% on RA. Labs notable for Cr 3.93, Na 133, K 4.6. Patient was given 2L NS and admitted for further management. 89 y/o F with DM2, HTN, HLD, GERD, gout, hypothyroidism, recent R hip hemiarthoplasty who presents for oliguria for 2 days.    Patient has noticed that for the past 2 days, she has been having significantly decreased urine output. She denies any dysuria, flank pain, N/V, diarrhea, constipation, fever, chills, CP, SOB. Of note,patient has been having increased LE edema since her hip surgery in January. Her PMD started her on spiranolactone/HCTZ on 4/6. When this didn't improve her edema, he switched  her to lasix 40 BID and eplerenone on 4/11, to be taken for 10 days (which she complete). She was also noted to have a cellulitis on her L lower leg and was started on keflex (she has 4 days remaining of her abx).     In the ED, vitals were 98.9, 85, 96/49, 99% on RA. Labs notable for Cr 3.93, Na 133, K 4.6. Patient was given 2L NS and admitted for further management.

## 2017-04-23 NOTE — H&P ADULT. - PROBLEM SELECTOR PLAN 1
Likely pre-renal secondary to overdiuresis.  -  hold all diuretics  - s/p 2L NS, will given an additional liter now  - monitor Cr  - avoid nephrotoxic agents  - mckeon in place, monitor I/O Likely pre-renal secondary to overdiuresis.  -  hold all diuretics  - s/p 2L NS, will given an additional liter now  - monitor Cr  - avoid nephrotoxic agents  - mckeon in place, monitor I/O - d/c tomorrow AM

## 2017-04-23 NOTE — H&P ADULT. - PROBLEM SELECTOR PLAN 6
Stable  - continue clonidine, dilt, coreg  - hold benazepril, spiranolactone, HCTZ for now given FLETCHER Stable  - continue clonidine (patch placed on friday, not due again until next friday) coreg  - dilt was held by her PMD when diuretics were started, hold for now, restart if needed  - hold benazepril, spiranolactone, HCTZ for now given FLETCHER

## 2017-04-23 NOTE — ED ADULT NURSE NOTE - CHIEF COMPLAINT QUOTE
p/t c/o of difficulty urinating for past 24 hrs p/t c/o of swelling to ble p/t denies any chest pain denies sob @ present

## 2017-04-23 NOTE — H&P ADULT. - PROBLEM SELECTOR PLAN 9
Improve score 1  - encourage ambulation Improving significantly. She did have a cellulitis on her L lower leg, but this looks resolved currently.  - elevate legs, compression stockings recommended  - will continue keflex to complete course (4 more days)

## 2017-04-23 NOTE — ED PROVIDER NOTE - MEDICAL DECISION MAKING DETAILS
Anthony: 89 F, on diuretics, c/o decreased UOP 2 days. No F/V/D/dehydration. /38. Appears well. NAD. Normal mental status. Summers catheter placed: minimal return. Concern for ARF. Plan: labs, IVF.

## 2017-04-23 NOTE — ED PROVIDER NOTE - OBJECTIVE STATEMENT
89F hx DM, HTN, Hypothyroidism p/w decreased urine output x 2 days accompanied by daughter at bedside.    Pt states that she has had LE edema since Januray after her partial hip replacement and her PMD Dr. Carias on 4/11 started her on Lasix 40mg BID, Eplerenone 25mg BIDx10 days to decrease edema.  Pt also has uncontrolled HTN for which she's on Benazepril, Coreg, Diltiazem, Clonidine patch, and Spironolactone-HCTZ.  Pt denies any abdominal pain, N/V/C/D but reports feeling weak and no appetite. Denies CP/SOB.

## 2017-04-23 NOTE — ED ADULT NURSE NOTE - OBJECTIVE STATEMENT
pt received a&ox3, c/o urinary retention x 2 days, states she has only been able to urinate very small amounts, pt states she was on 3 different diuretics and 2 were dcd and since she has been having difficulty urinating, pt denies cp/sob, slight swelling noted to lower legs pt states they have improved significantly since taking the diuretics, pt appears to be in NAD, vs as reported, md notified of low bp, as per md order mckeon placed, draining at bedside, 20 gauge IV placed in right ac, labs drawn and sent, will continue to monitor, pt placed on monitor.

## 2017-04-23 NOTE — H&P ADULT. - ASSESSMENT
89 y/o F with DM2, HTN, HLD, GERD, gout, hypothyroidism, recent R hip hemiarthoplasty who presents for oliguria for 2 day in the setting of taking 4 diuretics. Found to have FLETCHER.

## 2017-04-23 NOTE — H&P ADULT. - PROBLEM SELECTOR PLAN 5
Stable  - monitor FS  - continue lantus 25 in the AM, 24 in the PM  - ISS  - hold metformin Stable  - monitor FS  - continue lantus 25 in the AM  - patient only occasionally takes 24 in the PM depending on her fingersticks - will hold for now  - ISS  - hold metformin

## 2017-04-23 NOTE — ED PROVIDER NOTE - ATTENDING CONTRIBUTION TO CARE
I performed a face-to-face evaluation of the patient and performed a history and physical examination. I agree with the history and physical examination.    Anthony: 89 F, on diuretics, c/o decreased UOP 2 days. No F/V/D/dehydration. /38. Appears well. NAD. Normal mental status. Summers catheter placed: minimal return. Concern for ARF. Plan: labs, IVF.

## 2017-04-24 ENCOUNTER — TRANSCRIPTION ENCOUNTER (OUTPATIENT)
Age: 82
End: 2017-04-24

## 2017-04-24 LAB
ALBUMIN SERPL ELPH-MCNC: 2.8 G/DL — LOW (ref 3.3–5)
ALP SERPL-CCNC: 66 U/L — SIGNIFICANT CHANGE UP (ref 40–120)
ALT FLD-CCNC: 14 U/L — SIGNIFICANT CHANGE UP (ref 4–33)
AST SERPL-CCNC: 26 U/L — SIGNIFICANT CHANGE UP (ref 4–32)
BACTERIA UR CULT: SIGNIFICANT CHANGE UP
BILIRUB SERPL-MCNC: 0.3 MG/DL — SIGNIFICANT CHANGE UP (ref 0.2–1.2)
BUN SERPL-MCNC: 51 MG/DL — HIGH (ref 7–23)
CALCIUM SERPL-MCNC: 7.8 MG/DL — LOW (ref 8.4–10.5)
CHLORIDE SERPL-SCNC: 103 MMOL/L — SIGNIFICANT CHANGE UP (ref 98–107)
CO2 SERPL-SCNC: 21 MMOL/L — LOW (ref 22–31)
CREAT SERPL-MCNC: 2.27 MG/DL — HIGH (ref 0.5–1.3)
GLUCOSE SERPL-MCNC: 68 MG/DL — LOW (ref 70–99)
HBA1C BLD-MCNC: 7.2 % — HIGH (ref 4–5.6)
HCT VFR BLD CALC: 31.7 % — LOW (ref 34.5–45)
HGB BLD-MCNC: 9.7 G/DL — LOW (ref 11.5–15.5)
MAGNESIUM SERPL-MCNC: 1.1 MG/DL — LOW (ref 1.6–2.6)
MCHC RBC-ENTMCNC: 24.9 PG — LOW (ref 27–34)
MCHC RBC-ENTMCNC: 30.6 % — LOW (ref 32–36)
MCV RBC AUTO: 81.3 FL — SIGNIFICANT CHANGE UP (ref 80–100)
PLATELET # BLD AUTO: 222 K/UL — SIGNIFICANT CHANGE UP (ref 150–400)
PMV BLD: 10.3 FL — SIGNIFICANT CHANGE UP (ref 7–13)
POTASSIUM SERPL-MCNC: 4.3 MMOL/L — SIGNIFICANT CHANGE UP (ref 3.5–5.3)
POTASSIUM SERPL-SCNC: 4.3 MMOL/L — SIGNIFICANT CHANGE UP (ref 3.5–5.3)
PROT SERPL-MCNC: 5.4 G/DL — LOW (ref 6–8.3)
RBC # BLD: 3.9 M/UL — SIGNIFICANT CHANGE UP (ref 3.8–5.2)
RBC # FLD: 20.4 % — HIGH (ref 10.3–14.5)
SODIUM SERPL-SCNC: 137 MMOL/L — SIGNIFICANT CHANGE UP (ref 135–145)
SPECIMEN SOURCE: SIGNIFICANT CHANGE UP
WBC # BLD: 6.22 K/UL — SIGNIFICANT CHANGE UP (ref 3.8–10.5)
WBC # FLD AUTO: 6.22 K/UL — SIGNIFICANT CHANGE UP (ref 3.8–10.5)

## 2017-04-24 PROCEDURE — 99233 SBSQ HOSP IP/OBS HIGH 50: CPT

## 2017-04-24 RX ORDER — HEPARIN SODIUM 5000 [USP'U]/ML
5000 INJECTION INTRAVENOUS; SUBCUTANEOUS EVERY 8 HOURS
Qty: 0 | Refills: 0 | Status: DISCONTINUED | OUTPATIENT
Start: 2017-04-24 | End: 2017-04-26

## 2017-04-24 RX ORDER — MAGNESIUM SULFATE 500 MG/ML
2 VIAL (ML) INJECTION ONCE
Qty: 0 | Refills: 0 | Status: COMPLETED | OUTPATIENT
Start: 2017-04-24 | End: 2017-04-24

## 2017-04-24 RX ORDER — ACETAMINOPHEN 500 MG
650 TABLET ORAL ONCE
Qty: 0 | Refills: 0 | Status: COMPLETED | OUTPATIENT
Start: 2017-04-24 | End: 2017-04-24

## 2017-04-24 RX ORDER — SODIUM CHLORIDE 9 MG/ML
1000 INJECTION INTRAMUSCULAR; INTRAVENOUS; SUBCUTANEOUS
Qty: 0 | Refills: 0 | Status: DISCONTINUED | OUTPATIENT
Start: 2017-04-24 | End: 2017-04-25

## 2017-04-24 RX ADMIN — HEPARIN SODIUM 5000 UNIT(S): 5000 INJECTION INTRAVENOUS; SUBCUTANEOUS at 21:23

## 2017-04-24 RX ADMIN — Medication 50 GRAM(S): at 10:28

## 2017-04-24 RX ADMIN — Medication 200 MILLIGRAM(S): at 00:17

## 2017-04-24 RX ADMIN — Medication 650 MILLIGRAM(S): at 04:30

## 2017-04-24 RX ADMIN — PANTOPRAZOLE SODIUM 40 MILLIGRAM(S): 20 TABLET, DELAYED RELEASE ORAL at 07:13

## 2017-04-24 RX ADMIN — Medication 50 MICROGRAM(S): at 07:12

## 2017-04-24 RX ADMIN — Medication 250 MILLIGRAM(S): at 13:06

## 2017-04-24 RX ADMIN — ATORVASTATIN CALCIUM 40 MILLIGRAM(S): 80 TABLET, FILM COATED ORAL at 21:23

## 2017-04-24 NOTE — DISCHARGE NOTE ADULT - CARE PROVIDER_API CALL
Gio Carias), Internal Medicine  [address] [facility]  [place] [id number]  Phone: [phone]  Fax: [phone]

## 2017-04-24 NOTE — DISCHARGE NOTE ADULT - MEDICATION SUMMARY - MEDICATIONS TO CHANGE
I will SWITCH the dose or number of times a day I take the medications listed below when I get home from the hospital:    carvedilol 12.5 mg oral tablet  -- 0.5 tab(s) by mouth 2 times a day

## 2017-04-24 NOTE — DISCHARGE NOTE ADULT - PLAN OF CARE
Improving resolved Lower extremity doppler done-no clot noted Take medications as prescribed. Follow up with PMD within one week. Call for appointment Drink plenty of fluids. Follow up with PMD for repeat blood work for kidney functions in one week. Drink plenty of fluids. Follow up with PMD for repeat blood work for kidney functions in one week.  Creat 0.94 on 4/26/17

## 2017-04-24 NOTE — DISCHARGE NOTE ADULT - REASON FOR ADMISSION
urine retention Oliguria sec to FLETCHER with dehydration sec multiple diuretics for LE edema.  Le Cellulitis- prior to admission

## 2017-04-24 NOTE — DISCHARGE NOTE ADULT - CONDITIONS AT DISCHARGE
Pt A&Ox4. VS stable. Pt denies any pain or discomfort. Alert & oriented x4. Out of bed ambulating with one assistance. Tolerating diet without nausea or vomiting. Voiding without difficulty. Vitals stable, afebrile. Denies pain. PIV discontinued. Discharged with daughter and verbalized understanding of all discharge instruction & will follow up with the MD.

## 2017-04-24 NOTE — DISCHARGE NOTE ADULT - HOSPITAL COURSE
89 y/o F with DM2, HTN, HLD, GERD, gout, hypothyroidism, recent R hip hemiarthoplasty who presents for oliguria for 2 day in the setting of taking 4 diuretics. Found to have FLETCHER.    Acute kidney injury  -pre-renal secondary to overdiuresis.  -hold all diuretics  - avoid nephrotoxic agents  - mckeon in place, monitor I/O -   -4/25-passed TOV    Hyponatremia  -Secondary to hypovolemia.  -s/p bolus  -Resolved    Hypothyroidism  -continue synthroid.     Hyperlipidemia  -continue atorva.     DM  -continue lantus 25 in the AM  - ISS  - hold metforminStable  - monitor FS  - continue lantus 25 in the AM, 24 in the PM  - ISS  - hold metformin.     Essential hypertension  -continue clonidine (patch placed on friday, not due again until next friday) coreg  - dilt was held by her PMD when diuretics were started, hold for now, restart if needed  - hold benazepril, spiranolactone, HCTZ for now given AKIStable  - continue clonidine, dilt, coreg  - hold benazepril, spiranolactone, HCTZ for now given FLETCHER.  -4/24-corig d/cd due to hypotension    Gastroesophageal reflux disease  -continue PPI.     Idiopathic gout  -Not active currently  - hold allopurinol and colchicine given FLETCHER.     Bilateral edema of lower extremity   -Prophylactic measure  -will continue keflex to complete course (4 more days)Improve score 1  - encourage ambulation  -DVT prophylasxis.   -deena lower extr. doppler-neg 89 y/o F with DM2, HTN, HLD, GERD, gout, hypothyroidism, recent R hip hemiarthoplasty who presents for oliguria for 2 day in the setting of taking 4 diuretics. Found to have FLETCHER.    Acute kidney injury  -pre-renal secondary to overdiuresis.  -hold all diuretics  - avoid nephrotoxic agents  - mckeon in place, monitor I/O - 4/25-passed TOV    Hyponatremia  -Secondary to hypovolemia.  -s/p bolus  -Resolved    Hypothyroidism  -continue synthroid.     Hyperlipidemia  -continue atorva.     DM  -continue lantus 25 in the AM  - ISS  - hold metformin  - monitor FS  - continue lantus 25 in the AM, 24 in the PM  - ISS  - hold metformin.     Essential hypertension  -continue clonidine (patch placed on friday, not due again until next friday) coreg  - dilt was held by her PMD when diuretics were started, hold for now, restart if needed  - hold benazepril, spiranolactone, HCTZ for now given AKIStable  - continue clonidine, dilt, coreg  - hold benazepril, spiranolactone, HCTZ for now given FLETCHER.  -4/24-corig d/cd due to hypotension  -4/25-restarted corig    Gastroesophageal reflux disease  -continue PPI.     Idiopathic gout  -Not active currently  - hold allopurinol and colchicine given FLETCHER.     Bilateral edema of lower extremity   -will continue keflex to complete course (4 more days)Improve score 1  - encourage ambulation  -DVT prophylasxis.   -deena lower extr. doppler-neg 89 y/o F with DM2, HTN, HLD, GERD, gout, hypothyroidism, recent R hip hemiarthoplasty who presents for oliguria for 2 day in the setting of taking 4 diuretics.   Found to have FLETCHER. mckeon was placed in and removed as creat improved from 3.93 to 0.94 on 4/26/17.  B/l le doppler- neg dvt.  L leg cellulitis was treated with Keflex started prior to admission and resolved cellulitis with Keflex x 8 days- no more ABX needed.  LE edema greatly improved to 1 plus and patient will resume TEDS stockins for VI causing poor venous return resulting in edema.  She will restart only aldactone and f/u PCP - appointment on 4/28/17    Acute kidney injury  -pre-renal secondary to overdiuresis.  -hold all diuretics  - avoid nephrotoxic agents  - mckeon in place, monitor I/O - 4/25-passed TOV    Hyponatremia  -Secondary to hypovolemia.  -s/p bolus  -Resolved    Hypothyroidism  -continue synthroid.     Hyperlipidemia  -continue atorva.     DM  -continue lantus 25 in the AM  - ISS  - hold metformin  - monitor FS  - continue lantus 25 in the AM, 24 in the PM  - ISS  - hold metformin.     Essential hypertension  -continue clonidine (patch placed on friday, not due again until next friday) coreg  - dilt was held by her PMD when diuretics were started, hold for now, restart if needed  - hold benazepril, spiranolactone, HCTZ for now given AKIStable  - continue clonidine, dilt, coreg  - hold benazepril, spiranolactone, HCTZ for now given FLETCHER.  -4/24-corig d/cd due to hypotension  -4/25-restarted corig    Gastroesophageal reflux disease  -continue PPI.     Idiopathic gout  -Not active currently  - hold allopurinol and colchicine given FLETCHER.     Bilateral edema of lower extremity   -will continue keflex to complete course (4 more days)Improve score 1  - encourage ambulation  -DVT prophylasxis.   -deena lower extr. doppler-neg

## 2017-04-24 NOTE — DISCHARGE NOTE ADULT - MEDICATION SUMMARY - MEDICATIONS TO STOP TAKING
I will STOP taking the medications listed below when I get home from the hospital:    benazepril 20 mg oral tablet  -- 1 tab(s) by mouth once a day    Colcrys 0.6 mg oral tablet  --  by mouth

## 2017-04-24 NOTE — OCCUPATIONAL THERAPY INITIAL EVALUATION ADULT - PERTINENT HX OF CURRENT PROBLEM, REHAB EVAL
89 y/o F with DM2, HTN, HLD, GERD, gout, hypothyroidism, recent R hip hemiarthoplasty who presents for oliguria for 2 day in the setting of taking 4 diuretics. Found to have FLETCHER.

## 2017-04-24 NOTE — DISCHARGE NOTE ADULT - MEDICATION SUMMARY - MEDICATIONS TO TAKE
I will START or STAY ON the medications listed below when I get home from the hospital:    spironolactone 25 mg oral tablet  -- 1 tab(s) by mouth once a day  -- It is very important that you take or use this exactly as directed.  Do not skip doses or discontinue unless directed by your doctor.  May cause drowsiness or dizziness.    -- Indication: For Leg edema    cloNIDine 0.3 mg/24 hr transdermal film, extended release  -- 1 patch by mouth once a week  -- Indication: For High blood pressure    metFORMIN 500 mg oral tablet, extended release  -- 1 tab(s) by mouth 2 times a day  -- Indication: For Diabetes    Lantus Solostar Pen 100 units/mL subcutaneous solution  -- 24 unit(s) subcutaneous once a day in the am  -- Indication: For diabetes    allopurinol 100 mg oral tablet  -- 2 tab(s) by mouth once a day  -- Indication: For Gout    atorvastatin 40 mg oral tablet  -- 1 tab(s) by mouth once a day (at bedtime)  -- Indication: For High cholesterol    carvedilol 12.5 mg oral tablet  -- 1 tab(s) by mouth every 12 hours  -- Indication: For High blood pressure    albuterol 90 mcg/inh inhalation aerosol  -- 1 puff(s) inhaled every 4 hours, As needed, Shortness of Breath and/or Wheezing  -- Indication: For shortness of breath    omeprazole 20 mg oral delayed release capsule  -- 1 cap(s) by mouth once a day  -- Indication: For GERD    levothyroxine 50 mcg (0.05 mg) oral tablet  -- 1 tab(s) by mouth once a day  -- Indication: For Hypothyoidism     Vitamin D3 1000 intl units oral capsule  -- 1 cap(s) by mouth once a day  -- Indication: For supplement

## 2017-04-24 NOTE — DISCHARGE NOTE ADULT - INSTRUCTIONS
Consistent carbohydrate Please notify MD for decreased or minimal urine output, fever >101, chills, inability to tolerate diet. Follow up with Primary MD within one week as indicated.

## 2017-04-24 NOTE — PHYSICAL THERAPY INITIAL EVALUATION ADULT - PERTINENT HX OF CURRENT PROBLEM, REHAB EVAL
This is a 89 y/o F with recent R hip hemiarthroplasty who presents for oliguria found to have FLETCHER.

## 2017-04-24 NOTE — DISCHARGE NOTE ADULT - CARE PLAN
Principal Discharge DX:	Acute kidney injury  Goal:	Improving  Secondary Diagnosis:	Bilateral edema of lower extremity  Goal:	Improving  Secondary Diagnosis:	Essential hypertension  Secondary Diagnosis:	Hyponatremia  Goal:	resolved Principal Discharge DX:	Acute kidney injury  Goal:	Improving  Instructions for follow-up, activity and diet:	Drink plenty of fluids. Follow up with PMD for repeat blood work for kidney functions in one week.  Secondary Diagnosis:	Bilateral edema of lower extremity  Goal:	Improving  Instructions for follow-up, activity and diet:	Lower extremity doppler done-no clot noted  Secondary Diagnosis:	Essential hypertension  Instructions for follow-up, activity and diet:	Take medications as prescribed. Follow up with PMD within one week. Call for appointment  Secondary Diagnosis:	Hyponatremia  Goal:	resolved Principal Discharge DX:	Acute kidney injury  Goal:	Improving  Instructions for follow-up, activity and diet:	Drink plenty of fluids. Follow up with PMD for repeat blood work for kidney functions in one week.  Creat 0.94 on 4/26/17  Secondary Diagnosis:	Bilateral edema of lower extremity  Goal:	Improving  Instructions for follow-up, activity and diet:	Lower extremity doppler done-no clot noted  Secondary Diagnosis:	Essential hypertension  Instructions for follow-up, activity and diet:	Take medications as prescribed. Follow up with PMD within one week. Call for appointment  Secondary Diagnosis:	Hyponatremia  Goal:	resolved

## 2017-04-25 LAB
BUN SERPL-MCNC: 34 MG/DL — HIGH (ref 7–23)
CALCIUM SERPL-MCNC: 8.6 MG/DL — SIGNIFICANT CHANGE UP (ref 8.4–10.5)
CHLORIDE SERPL-SCNC: 109 MMOL/L — HIGH (ref 98–107)
CO2 SERPL-SCNC: 20 MMOL/L — LOW (ref 22–31)
CREAT SERPL-MCNC: 1.38 MG/DL — HIGH (ref 0.5–1.3)
GLUCOSE SERPL-MCNC: 132 MG/DL — HIGH (ref 70–99)
HCT VFR BLD CALC: 34.3 % — LOW (ref 34.5–45)
HGB BLD-MCNC: 10.7 G/DL — LOW (ref 11.5–15.5)
MAGNESIUM SERPL-MCNC: 1.9 MG/DL — SIGNIFICANT CHANGE UP (ref 1.6–2.6)
MCHC RBC-ENTMCNC: 25.5 PG — LOW (ref 27–34)
MCHC RBC-ENTMCNC: 31.2 % — LOW (ref 32–36)
MCV RBC AUTO: 81.7 FL — SIGNIFICANT CHANGE UP (ref 80–100)
PLATELET # BLD AUTO: 239 K/UL — SIGNIFICANT CHANGE UP (ref 150–400)
PMV BLD: 10.5 FL — SIGNIFICANT CHANGE UP (ref 7–13)
POTASSIUM SERPL-MCNC: 4.7 MMOL/L — SIGNIFICANT CHANGE UP (ref 3.5–5.3)
POTASSIUM SERPL-SCNC: 4.7 MMOL/L — SIGNIFICANT CHANGE UP (ref 3.5–5.3)
RBC # BLD: 4.2 M/UL — SIGNIFICANT CHANGE UP (ref 3.8–5.2)
RBC # FLD: 20.4 % — HIGH (ref 10.3–14.5)
SODIUM SERPL-SCNC: 140 MMOL/L — SIGNIFICANT CHANGE UP (ref 135–145)
WBC # BLD: 6.28 K/UL — SIGNIFICANT CHANGE UP (ref 3.8–10.5)
WBC # FLD AUTO: 6.28 K/UL — SIGNIFICANT CHANGE UP (ref 3.8–10.5)

## 2017-04-25 PROCEDURE — 93970 EXTREMITY STUDY: CPT | Mod: 26

## 2017-04-25 PROCEDURE — 99233 SBSQ HOSP IP/OBS HIGH 50: CPT

## 2017-04-25 RX ORDER — CARVEDILOL PHOSPHATE 80 MG/1
12.5 CAPSULE, EXTENDED RELEASE ORAL EVERY 12 HOURS
Qty: 0 | Refills: 0 | Status: DISCONTINUED | OUTPATIENT
Start: 2017-04-25 | End: 2017-04-26

## 2017-04-25 RX ORDER — SODIUM CHLORIDE 9 MG/ML
1000 INJECTION INTRAMUSCULAR; INTRAVENOUS; SUBCUTANEOUS
Qty: 0 | Refills: 0 | Status: DISCONTINUED | OUTPATIENT
Start: 2017-04-25 | End: 2017-04-26

## 2017-04-25 RX ORDER — CARVEDILOL PHOSPHATE 80 MG/1
0.5 CAPSULE, EXTENDED RELEASE ORAL
Qty: 0 | Refills: 0 | COMMUNITY
Start: 2017-04-25

## 2017-04-25 RX ORDER — ATORVASTATIN CALCIUM 80 MG/1
1 TABLET, FILM COATED ORAL
Qty: 0 | Refills: 0 | DISCHARGE
Start: 2017-04-25

## 2017-04-25 RX ORDER — CARVEDILOL PHOSPHATE 80 MG/1
1 CAPSULE, EXTENDED RELEASE ORAL
Qty: 0 | Refills: 0 | COMMUNITY
Start: 2017-04-25

## 2017-04-25 RX ORDER — CARVEDILOL PHOSPHATE 80 MG/1
0.5 CAPSULE, EXTENDED RELEASE ORAL
Qty: 0 | Refills: 0 | COMMUNITY

## 2017-04-25 RX ORDER — SODIUM CHLORIDE 9 MG/ML
1000 INJECTION INTRAMUSCULAR; INTRAVENOUS; SUBCUTANEOUS
Qty: 0 | Refills: 0 | Status: DISCONTINUED | OUTPATIENT
Start: 2017-04-25 | End: 2017-04-25

## 2017-04-25 RX ADMIN — HEPARIN SODIUM 5000 UNIT(S): 5000 INJECTION INTRAVENOUS; SUBCUTANEOUS at 07:09

## 2017-04-25 RX ADMIN — Medication 2: at 12:31

## 2017-04-25 RX ADMIN — SODIUM CHLORIDE 60 MILLILITER(S): 9 INJECTION INTRAMUSCULAR; INTRAVENOUS; SUBCUTANEOUS at 17:59

## 2017-04-25 RX ADMIN — INSULIN GLARGINE 24 UNIT(S): 100 INJECTION, SOLUTION SUBCUTANEOUS at 09:01

## 2017-04-25 RX ADMIN — SODIUM CHLORIDE 75 MILLILITER(S): 9 INJECTION INTRAMUSCULAR; INTRAVENOUS; SUBCUTANEOUS at 09:01

## 2017-04-25 RX ADMIN — HEPARIN SODIUM 5000 UNIT(S): 5000 INJECTION INTRAVENOUS; SUBCUTANEOUS at 21:06

## 2017-04-25 RX ADMIN — ATORVASTATIN CALCIUM 40 MILLIGRAM(S): 80 TABLET, FILM COATED ORAL at 21:05

## 2017-04-25 RX ADMIN — Medication 250 MILLIGRAM(S): at 12:23

## 2017-04-25 RX ADMIN — Medication 50 MICROGRAM(S): at 07:10

## 2017-04-25 RX ADMIN — PANTOPRAZOLE SODIUM 40 MILLIGRAM(S): 20 TABLET, DELAYED RELEASE ORAL at 07:10

## 2017-04-25 RX ADMIN — HEPARIN SODIUM 5000 UNIT(S): 5000 INJECTION INTRAVENOUS; SUBCUTANEOUS at 14:11

## 2017-04-25 RX ADMIN — CARVEDILOL PHOSPHATE 12.5 MILLIGRAM(S): 80 CAPSULE, EXTENDED RELEASE ORAL at 18:25

## 2017-04-26 VITALS
SYSTOLIC BLOOD PRESSURE: 146 MMHG | OXYGEN SATURATION: 98 % | DIASTOLIC BLOOD PRESSURE: 62 MMHG | HEART RATE: 68 BPM | RESPIRATION RATE: 17 BRPM | TEMPERATURE: 98 F

## 2017-04-26 LAB
BUN SERPL-MCNC: 18 MG/DL — SIGNIFICANT CHANGE UP (ref 7–23)
CALCIUM SERPL-MCNC: 8.3 MG/DL — LOW (ref 8.4–10.5)
CHLORIDE SERPL-SCNC: 110 MMOL/L — HIGH (ref 98–107)
CO2 SERPL-SCNC: 22 MMOL/L — SIGNIFICANT CHANGE UP (ref 22–31)
CREAT SERPL-MCNC: 0.94 MG/DL — SIGNIFICANT CHANGE UP (ref 0.5–1.3)
GLUCOSE SERPL-MCNC: 184 MG/DL — HIGH (ref 70–99)
POTASSIUM SERPL-MCNC: 4.7 MMOL/L — SIGNIFICANT CHANGE UP (ref 3.5–5.3)
POTASSIUM SERPL-SCNC: 4.7 MMOL/L — SIGNIFICANT CHANGE UP (ref 3.5–5.3)
SODIUM SERPL-SCNC: 142 MMOL/L — SIGNIFICANT CHANGE UP (ref 135–145)

## 2017-04-26 PROCEDURE — 99239 HOSP IP/OBS DSCHRG MGMT >30: CPT

## 2017-04-26 RX ORDER — ENOXAPARIN SODIUM 100 MG/ML
24 INJECTION SUBCUTANEOUS
Qty: 0 | Refills: 0 | COMMUNITY

## 2017-04-26 RX ORDER — SPIRONOLACTONE 25 MG/1
1 TABLET, FILM COATED ORAL
Qty: 30 | Refills: 0 | OUTPATIENT
Start: 2017-04-26 | End: 2017-05-26

## 2017-04-26 RX ADMIN — HEPARIN SODIUM 5000 UNIT(S): 5000 INJECTION INTRAVENOUS; SUBCUTANEOUS at 13:22

## 2017-04-26 RX ADMIN — INSULIN GLARGINE 24 UNIT(S): 100 INJECTION, SOLUTION SUBCUTANEOUS at 10:00

## 2017-04-26 RX ADMIN — Medication 4: at 12:14

## 2017-04-26 RX ADMIN — Medication 250 MILLIGRAM(S): at 12:13

## 2017-04-26 RX ADMIN — CARVEDILOL PHOSPHATE 12.5 MILLIGRAM(S): 80 CAPSULE, EXTENDED RELEASE ORAL at 08:04

## 2017-04-26 RX ADMIN — PANTOPRAZOLE SODIUM 40 MILLIGRAM(S): 20 TABLET, DELAYED RELEASE ORAL at 08:04

## 2017-04-26 RX ADMIN — Medication 50 MICROGRAM(S): at 08:04

## 2017-04-26 RX ADMIN — HEPARIN SODIUM 5000 UNIT(S): 5000 INJECTION INTRAVENOUS; SUBCUTANEOUS at 08:04

## 2017-07-18 ENCOUNTER — INPATIENT (INPATIENT)
Facility: HOSPITAL | Age: 82
LOS: 1 days | Discharge: HOME | End: 2017-07-20
Attending: HOSPITALIST | Admitting: HOSPITALIST
Payer: MEDICARE

## 2017-07-18 VITALS
DIASTOLIC BLOOD PRESSURE: 99 MMHG | SYSTOLIC BLOOD PRESSURE: 240 MMHG | HEART RATE: 72 BPM | TEMPERATURE: 98 F | RESPIRATION RATE: 18 BRPM

## 2017-07-18 DIAGNOSIS — K42.9 UMBILICAL HERNIA WITHOUT OBSTRUCTION OR GANGRENE: Chronic | ICD-10-CM

## 2017-07-18 LAB
ALBUMIN SERPL ELPH-MCNC: 3.4 G/DL — SIGNIFICANT CHANGE UP (ref 3.3–5)
ALP SERPL-CCNC: 90 U/L — SIGNIFICANT CHANGE UP (ref 40–120)
ALT FLD-CCNC: 21 U/L — SIGNIFICANT CHANGE UP (ref 4–33)
AST SERPL-CCNC: 31 U/L — SIGNIFICANT CHANGE UP (ref 4–32)
BASOPHILS # BLD AUTO: 0.06 K/UL — SIGNIFICANT CHANGE UP (ref 0–0.2)
BASOPHILS NFR BLD AUTO: 0.8 % — SIGNIFICANT CHANGE UP (ref 0–2)
BILIRUB SERPL-MCNC: 0.6 MG/DL — SIGNIFICANT CHANGE UP (ref 0.2–1.2)
BUN SERPL-MCNC: 12 MG/DL — SIGNIFICANT CHANGE UP (ref 7–23)
CALCIUM SERPL-MCNC: 8.6 MG/DL — SIGNIFICANT CHANGE UP (ref 8.4–10.5)
CHLORIDE SERPL-SCNC: 103 MMOL/L — SIGNIFICANT CHANGE UP (ref 98–107)
CK MB BLD-MCNC: 1.12 NG/ML — SIGNIFICANT CHANGE UP (ref 1–4.7)
CK SERPL-CCNC: 59 U/L — SIGNIFICANT CHANGE UP (ref 25–170)
CO2 SERPL-SCNC: 25 MMOL/L — SIGNIFICANT CHANGE UP (ref 22–31)
CREAT SERPL-MCNC: 0.89 MG/DL — SIGNIFICANT CHANGE UP (ref 0.5–1.3)
EOSINOPHIL # BLD AUTO: 0.65 K/UL — HIGH (ref 0–0.5)
EOSINOPHIL NFR BLD AUTO: 8.8 % — HIGH (ref 0–6)
GLUCOSE SERPL-MCNC: 152 MG/DL — HIGH (ref 70–99)
HCT VFR BLD CALC: 35.9 % — SIGNIFICANT CHANGE UP (ref 34.5–45)
HGB BLD-MCNC: 11.4 G/DL — LOW (ref 11.5–15.5)
IMM GRANULOCYTES # BLD AUTO: 0.01 # — SIGNIFICANT CHANGE UP
IMM GRANULOCYTES NFR BLD AUTO: 0.1 % — SIGNIFICANT CHANGE UP (ref 0–1.5)
LYMPHOCYTES # BLD AUTO: 2.04 K/UL — SIGNIFICANT CHANGE UP (ref 1–3.3)
LYMPHOCYTES # BLD AUTO: 27.5 % — SIGNIFICANT CHANGE UP (ref 13–44)
MAGNESIUM SERPL-MCNC: 1.2 MG/DL — LOW (ref 1.6–2.6)
MCHC RBC-ENTMCNC: 27.5 PG — SIGNIFICANT CHANGE UP (ref 27–34)
MCHC RBC-ENTMCNC: 31.8 % — LOW (ref 32–36)
MCV RBC AUTO: 86.5 FL — SIGNIFICANT CHANGE UP (ref 80–100)
MONOCYTES # BLD AUTO: 0.83 K/UL — SIGNIFICANT CHANGE UP (ref 0–0.9)
MONOCYTES NFR BLD AUTO: 11.2 % — SIGNIFICANT CHANGE UP (ref 2–14)
NEUTROPHILS # BLD AUTO: 3.83 K/UL — SIGNIFICANT CHANGE UP (ref 1.8–7.4)
NEUTROPHILS NFR BLD AUTO: 51.6 % — SIGNIFICANT CHANGE UP (ref 43–77)
NRBC # FLD: 0 — SIGNIFICANT CHANGE UP
PHOSPHATE SERPL-MCNC: 2.8 MG/DL — SIGNIFICANT CHANGE UP (ref 2.5–4.5)
PLATELET # BLD AUTO: 254 K/UL — SIGNIFICANT CHANGE UP (ref 150–400)
PMV BLD: 9.9 FL — SIGNIFICANT CHANGE UP (ref 7–13)
POTASSIUM SERPL-MCNC: 4.5 MMOL/L — SIGNIFICANT CHANGE UP (ref 3.5–5.3)
POTASSIUM SERPL-SCNC: 4.5 MMOL/L — SIGNIFICANT CHANGE UP (ref 3.5–5.3)
PROT SERPL-MCNC: 6.4 G/DL — SIGNIFICANT CHANGE UP (ref 6–8.3)
RBC # BLD: 4.15 M/UL — SIGNIFICANT CHANGE UP (ref 3.8–5.2)
RBC # FLD: 16.4 % — HIGH (ref 10.3–14.5)
SODIUM SERPL-SCNC: 141 MMOL/L — SIGNIFICANT CHANGE UP (ref 135–145)
TROPONIN T SERPL-MCNC: < 0.06 NG/ML — SIGNIFICANT CHANGE UP (ref 0–0.06)
WBC # BLD: 7.42 K/UL — SIGNIFICANT CHANGE UP (ref 3.8–10.5)
WBC # FLD AUTO: 7.42 K/UL — SIGNIFICANT CHANGE UP (ref 3.8–10.5)

## 2017-07-18 PROCEDURE — 70450 CT HEAD/BRAIN W/O DYE: CPT | Mod: 26

## 2017-07-18 RX ORDER — HYDRALAZINE HCL 50 MG
10 TABLET ORAL ONCE
Qty: 0 | Refills: 0 | Status: COMPLETED | OUTPATIENT
Start: 2017-07-18 | End: 2017-07-18

## 2017-07-18 RX ORDER — LABETALOL HCL 100 MG
10 TABLET ORAL ONCE
Qty: 0 | Refills: 0 | Status: COMPLETED | OUTPATIENT
Start: 2017-07-18 | End: 2017-07-18

## 2017-07-18 RX ADMIN — Medication 10 MILLIGRAM(S): at 21:35

## 2017-07-18 RX ADMIN — Medication 10 MILLIGRAM(S): at 22:26

## 2017-07-18 NOTE — ED ADULT NURSE NOTE - OBJECTIVE STATEMENT
pt AO x3, ambulatory, c/o HTN for 2 days with headache. pt states she is taking her daily BP med. As per pt, pt took aspirin which relieved headache. Denies chest pain, dizziness, SOB and n/v at this time. 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 Evaluated by provider. Cardiac monitor in place/sinus. Will continue to monitor.

## 2017-07-18 NOTE — ED PROVIDER NOTE - ATTENDING CONTRIBUTION TO CARE
DR. LANTIGUA, ATTENDING MD-  I performed a face to face bedside interview with patient regarding history of present illness, review of symptoms and past medical history. I completed an independent physical exam.  I have discussed patient's plan of care with the resident.   Documentation as above in the note.    Sukh: gradual onset HA in setting of elevated BPs.  HA frontal, no meningismus.  on exam, patient very well appearing and comfortable, appears much younger than stated age of 89, neuro exam afocal but with SBP in 240's.  for stat head CT, BP control.  re-assess.  Patient does not have h/o HA's but I am not suspecting SAH given the subacute presentation and the fact that I think this is more likely related to the uncontrolled htn which she has had in the past.  Will control BP, check head CT, reassess

## 2017-07-18 NOTE — ED PROVIDER NOTE - OBJECTIVE STATEMENT
89-year-old female with a history of HTN, DMII, HLD, hypothroid who presents to the ED with headaches this morning. She was found to have a /99. She has a history of hypertensive emergency in the past that caused FLETCHER and left eye blindness. She took aspirin 81 mg x3 at home and the headaches have resolved. She denies chest pain, SOB, blurry vision, numbness/tingling, nausea/vomiting/diarrhea. She denies salt intake or dehydration.

## 2017-07-18 NOTE — ED PROVIDER NOTE - MEDICAL DECISION MAKING DETAILS
89-year-old female with history of HTN emergency who presents to the ED with headaches in the setting of markedly elevated BP. Will check CT head, CBC, CMP, cardiac enzymes. BP management with IV medications as needed.

## 2017-07-18 NOTE — ED ADULT TRIAGE NOTE - CHIEF COMPLAINT QUOTE
very high blood pressure for past two days  a few years ago pt lost her vision in her left eye due to HTN   head ache 6/10 pt took 3 baby asprin at home  and pain is down to 3/10 pain  ekg at triage

## 2017-07-19 DIAGNOSIS — I16.0 HYPERTENSIVE URGENCY: ICD-10-CM

## 2017-07-19 DIAGNOSIS — M10.9 GOUT, UNSPECIFIED: ICD-10-CM

## 2017-07-19 DIAGNOSIS — E03.9 HYPOTHYROIDISM, UNSPECIFIED: ICD-10-CM

## 2017-07-19 DIAGNOSIS — E11.9 TYPE 2 DIABETES MELLITUS WITHOUT COMPLICATIONS: ICD-10-CM

## 2017-07-19 DIAGNOSIS — Z29.9 ENCOUNTER FOR PROPHYLACTIC MEASURES, UNSPECIFIED: ICD-10-CM

## 2017-07-19 DIAGNOSIS — L30.4 ERYTHEMA INTERTRIGO: ICD-10-CM

## 2017-07-19 DIAGNOSIS — N39.0 URINARY TRACT INFECTION, SITE NOT SPECIFIED: ICD-10-CM

## 2017-07-19 DIAGNOSIS — K21.9 GASTRO-ESOPHAGEAL REFLUX DISEASE WITHOUT ESOPHAGITIS: ICD-10-CM

## 2017-07-19 DIAGNOSIS — E11.8 TYPE 2 DIABETES MELLITUS WITH UNSPECIFIED COMPLICATIONS: ICD-10-CM

## 2017-07-19 LAB
ALBUMIN SERPL ELPH-MCNC: 3.5 G/DL — SIGNIFICANT CHANGE UP (ref 3.3–5)
ALP SERPL-CCNC: 86 U/L — SIGNIFICANT CHANGE UP (ref 40–120)
ALT FLD-CCNC: 19 U/L — SIGNIFICANT CHANGE UP (ref 4–33)
APPEARANCE UR: CLEAR — SIGNIFICANT CHANGE UP
AST SERPL-CCNC: 28 U/L — SIGNIFICANT CHANGE UP (ref 4–32)
BACTERIA # UR AUTO: SIGNIFICANT CHANGE UP
BASOPHILS # BLD AUTO: 0.05 K/UL — SIGNIFICANT CHANGE UP (ref 0–0.2)
BASOPHILS NFR BLD AUTO: 0.6 % — SIGNIFICANT CHANGE UP (ref 0–2)
BILIRUB SERPL-MCNC: 0.5 MG/DL — SIGNIFICANT CHANGE UP (ref 0.2–1.2)
BILIRUB UR-MCNC: NEGATIVE — SIGNIFICANT CHANGE UP
BLOOD UR QL VISUAL: NEGATIVE — SIGNIFICANT CHANGE UP
BUN SERPL-MCNC: 12 MG/DL — SIGNIFICANT CHANGE UP (ref 7–23)
CALCIUM SERPL-MCNC: 8.6 MG/DL — SIGNIFICANT CHANGE UP (ref 8.4–10.5)
CHLORIDE SERPL-SCNC: 106 MMOL/L — SIGNIFICANT CHANGE UP (ref 98–107)
CK MB BLD-MCNC: 1.2 NG/ML — SIGNIFICANT CHANGE UP (ref 1–4.7)
CK SERPL-CCNC: 55 U/L — SIGNIFICANT CHANGE UP (ref 25–170)
CO2 SERPL-SCNC: 27 MMOL/L — SIGNIFICANT CHANGE UP (ref 22–31)
COLOR SPEC: SIGNIFICANT CHANGE UP
CREAT SERPL-MCNC: 0.79 MG/DL — SIGNIFICANT CHANGE UP (ref 0.5–1.3)
EOSINOPHIL # BLD AUTO: 0.8 K/UL — HIGH (ref 0–0.5)
EOSINOPHIL NFR BLD AUTO: 9 % — HIGH (ref 0–6)
GLUCOSE SERPL-MCNC: 171 MG/DL — HIGH (ref 70–99)
GLUCOSE UR-MCNC: NEGATIVE — SIGNIFICANT CHANGE UP
HCT VFR BLD CALC: 37.6 % — SIGNIFICANT CHANGE UP (ref 34.5–45)
HGB BLD-MCNC: 12 G/DL — SIGNIFICANT CHANGE UP (ref 11.5–15.5)
IMM GRANULOCYTES # BLD AUTO: 0.03 # — SIGNIFICANT CHANGE UP
IMM GRANULOCYTES NFR BLD AUTO: 0.3 % — SIGNIFICANT CHANGE UP (ref 0–1.5)
KETONES UR-MCNC: NEGATIVE — SIGNIFICANT CHANGE UP
LEUKOCYTE ESTERASE UR-ACNC: HIGH
LYMPHOCYTES # BLD AUTO: 2 K/UL — SIGNIFICANT CHANGE UP (ref 1–3.3)
LYMPHOCYTES # BLD AUTO: 22.5 % — SIGNIFICANT CHANGE UP (ref 13–44)
MAGNESIUM SERPL-MCNC: 1.4 MG/DL — LOW (ref 1.6–2.6)
MCHC RBC-ENTMCNC: 28.1 PG — SIGNIFICANT CHANGE UP (ref 27–34)
MCHC RBC-ENTMCNC: 31.9 % — LOW (ref 32–36)
MCV RBC AUTO: 88.1 FL — SIGNIFICANT CHANGE UP (ref 80–100)
MONOCYTES # BLD AUTO: 0.82 K/UL — SIGNIFICANT CHANGE UP (ref 0–0.9)
MONOCYTES NFR BLD AUTO: 9.2 % — SIGNIFICANT CHANGE UP (ref 2–14)
NEUTROPHILS # BLD AUTO: 5.19 K/UL — SIGNIFICANT CHANGE UP (ref 1.8–7.4)
NEUTROPHILS NFR BLD AUTO: 58.4 % — SIGNIFICANT CHANGE UP (ref 43–77)
NITRITE UR-MCNC: NEGATIVE — SIGNIFICANT CHANGE UP
NRBC # FLD: 0 — SIGNIFICANT CHANGE UP
PH UR: 7 — SIGNIFICANT CHANGE UP (ref 5–8)
PHOSPHATE SERPL-MCNC: 2.6 MG/DL — SIGNIFICANT CHANGE UP (ref 2.5–4.5)
PLATELET # BLD AUTO: 266 K/UL — SIGNIFICANT CHANGE UP (ref 150–400)
PMV BLD: 10.4 FL — SIGNIFICANT CHANGE UP (ref 7–13)
POTASSIUM SERPL-MCNC: 4.5 MMOL/L — SIGNIFICANT CHANGE UP (ref 3.5–5.3)
POTASSIUM SERPL-SCNC: 4.5 MMOL/L — SIGNIFICANT CHANGE UP (ref 3.5–5.3)
PROT SERPL-MCNC: 6.3 G/DL — SIGNIFICANT CHANGE UP (ref 6–8.3)
PROT UR-MCNC: NEGATIVE — SIGNIFICANT CHANGE UP
RBC # BLD: 4.27 M/UL — SIGNIFICANT CHANGE UP (ref 3.8–5.2)
RBC # FLD: 16.1 % — HIGH (ref 10.3–14.5)
RBC CASTS # UR COMP ASSIST: SIGNIFICANT CHANGE UP (ref 0–?)
SODIUM SERPL-SCNC: 143 MMOL/L — SIGNIFICANT CHANGE UP (ref 135–145)
SP GR SPEC: 1 — LOW (ref 1–1.03)
SQUAMOUS # UR AUTO: SIGNIFICANT CHANGE UP
TROPONIN T SERPL-MCNC: < 0.06 NG/ML — SIGNIFICANT CHANGE UP (ref 0–0.06)
UROBILINOGEN FLD QL: NORMAL E.U. — SIGNIFICANT CHANGE UP (ref 0.2–1)
WBC # BLD: 8.89 K/UL — SIGNIFICANT CHANGE UP (ref 3.8–10.5)
WBC # FLD AUTO: 8.89 K/UL — SIGNIFICANT CHANGE UP (ref 3.8–10.5)
WBC UR QL: HIGH (ref 0–?)

## 2017-07-19 PROCEDURE — 99223 1ST HOSP IP/OBS HIGH 75: CPT | Mod: GC

## 2017-07-19 RX ORDER — DEXTROSE 50 % IN WATER 50 %
12.5 SYRINGE (ML) INTRAVENOUS ONCE
Qty: 0 | Refills: 0 | Status: DISCONTINUED | OUTPATIENT
Start: 2017-07-19 | End: 2017-07-20

## 2017-07-19 RX ORDER — DEXTROSE 50 % IN WATER 50 %
1 SYRINGE (ML) INTRAVENOUS ONCE
Qty: 0 | Refills: 0 | Status: DISCONTINUED | OUTPATIENT
Start: 2017-07-19 | End: 2017-07-20

## 2017-07-19 RX ORDER — INSULIN LISPRO 100/ML
VIAL (ML) SUBCUTANEOUS AT BEDTIME
Qty: 0 | Refills: 0 | Status: DISCONTINUED | OUTPATIENT
Start: 2017-07-19 | End: 2017-07-20

## 2017-07-19 RX ORDER — ALLOPURINOL 300 MG
2 TABLET ORAL
Qty: 0 | Refills: 0 | COMMUNITY

## 2017-07-19 RX ORDER — CHOLECALCIFEROL (VITAMIN D3) 125 MCG
1000 CAPSULE ORAL DAILY
Qty: 0 | Refills: 0 | Status: DISCONTINUED | OUTPATIENT
Start: 2017-07-19 | End: 2017-07-20

## 2017-07-19 RX ORDER — HYDRALAZINE HCL 50 MG
10 TABLET ORAL ONCE
Qty: 0 | Refills: 0 | Status: COMPLETED | OUTPATIENT
Start: 2017-07-19 | End: 2017-07-19

## 2017-07-19 RX ORDER — DEXTROSE 50 % IN WATER 50 %
25 SYRINGE (ML) INTRAVENOUS ONCE
Qty: 0 | Refills: 0 | Status: DISCONTINUED | OUTPATIENT
Start: 2017-07-19 | End: 2017-07-20

## 2017-07-19 RX ORDER — CARVEDILOL PHOSPHATE 80 MG/1
12.5 CAPSULE, EXTENDED RELEASE ORAL EVERY 12 HOURS
Qty: 0 | Refills: 0 | Status: DISCONTINUED | OUTPATIENT
Start: 2017-07-19 | End: 2017-07-20

## 2017-07-19 RX ORDER — ACETAMINOPHEN 500 MG
650 TABLET ORAL EVERY 6 HOURS
Qty: 0 | Refills: 0 | Status: DISCONTINUED | OUTPATIENT
Start: 2017-07-19 | End: 2017-07-20

## 2017-07-19 RX ORDER — SODIUM CHLORIDE 9 MG/ML
1000 INJECTION, SOLUTION INTRAVENOUS
Qty: 0 | Refills: 0 | Status: DISCONTINUED | OUTPATIENT
Start: 2017-07-19 | End: 2017-07-20

## 2017-07-19 RX ORDER — LEVOTHYROXINE SODIUM 125 MCG
50 TABLET ORAL DAILY
Qty: 0 | Refills: 0 | Status: DISCONTINUED | OUTPATIENT
Start: 2017-07-19 | End: 2017-07-20

## 2017-07-19 RX ORDER — MAGNESIUM SULFATE 500 MG/ML
1 VIAL (ML) INJECTION ONCE
Qty: 0 | Refills: 0 | Status: COMPLETED | OUTPATIENT
Start: 2017-07-19 | End: 2017-07-19

## 2017-07-19 RX ORDER — ALBUTEROL 90 UG/1
1 AEROSOL, METERED ORAL EVERY 4 HOURS
Qty: 0 | Refills: 0 | Status: DISCONTINUED | OUTPATIENT
Start: 2017-07-19 | End: 2017-07-20

## 2017-07-19 RX ORDER — AMITRIPTYLINE HCL 25 MG
25 TABLET ORAL AT BEDTIME
Qty: 0 | Refills: 0 | Status: DISCONTINUED | OUTPATIENT
Start: 2017-07-19 | End: 2017-07-20

## 2017-07-19 RX ORDER — SPIRONOLACTONE 25 MG/1
25 TABLET, FILM COATED ORAL DAILY
Qty: 0 | Refills: 0 | Status: DISCONTINUED | OUTPATIENT
Start: 2017-07-19 | End: 2017-07-19

## 2017-07-19 RX ORDER — NYSTATIN CREAM 100000 [USP'U]/G
1 CREAM TOPICAL
Qty: 0 | Refills: 0 | Status: DISCONTINUED | OUTPATIENT
Start: 2017-07-19 | End: 2017-07-20

## 2017-07-19 RX ORDER — PANTOPRAZOLE SODIUM 20 MG/1
40 TABLET, DELAYED RELEASE ORAL
Qty: 0 | Refills: 0 | Status: DISCONTINUED | OUTPATIENT
Start: 2017-07-19 | End: 2017-07-20

## 2017-07-19 RX ORDER — INSULIN LISPRO 100/ML
VIAL (ML) SUBCUTANEOUS
Qty: 0 | Refills: 0 | Status: DISCONTINUED | OUTPATIENT
Start: 2017-07-19 | End: 2017-07-20

## 2017-07-19 RX ORDER — SPIRONOLACTONE 25 MG/1
25 TABLET, FILM COATED ORAL DAILY
Qty: 0 | Refills: 0 | Status: DISCONTINUED | OUTPATIENT
Start: 2017-07-19 | End: 2017-07-20

## 2017-07-19 RX ORDER — ACETAMINOPHEN 500 MG
650 TABLET ORAL EVERY 6 HOURS
Qty: 0 | Refills: 0 | Status: DISCONTINUED | OUTPATIENT
Start: 2017-07-19 | End: 2017-07-19

## 2017-07-19 RX ORDER — LISINOPRIL 2.5 MG/1
20 TABLET ORAL DAILY
Qty: 0 | Refills: 0 | Status: DISCONTINUED | OUTPATIENT
Start: 2017-07-19 | End: 2017-07-20

## 2017-07-19 RX ORDER — CARVEDILOL PHOSPHATE 80 MG/1
12.5 CAPSULE, EXTENDED RELEASE ORAL EVERY 12 HOURS
Qty: 0 | Refills: 0 | Status: DISCONTINUED | OUTPATIENT
Start: 2017-07-19 | End: 2017-07-19

## 2017-07-19 RX ORDER — ALLOPURINOL 300 MG
200 TABLET ORAL DAILY
Qty: 0 | Refills: 0 | Status: DISCONTINUED | OUTPATIENT
Start: 2017-07-19 | End: 2017-07-20

## 2017-07-19 RX ORDER — ATORVASTATIN CALCIUM 80 MG/1
40 TABLET, FILM COATED ORAL AT BEDTIME
Qty: 0 | Refills: 0 | Status: DISCONTINUED | OUTPATIENT
Start: 2017-07-19 | End: 2017-07-20

## 2017-07-19 RX ORDER — ENOXAPARIN SODIUM 100 MG/ML
40 INJECTION SUBCUTANEOUS EVERY 24 HOURS
Qty: 0 | Refills: 0 | Status: DISCONTINUED | OUTPATIENT
Start: 2017-07-19 | End: 2017-07-20

## 2017-07-19 RX ORDER — GLUCAGON INJECTION, SOLUTION 0.5 MG/.1ML
1 INJECTION, SOLUTION SUBCUTANEOUS ONCE
Qty: 0 | Refills: 0 | Status: DISCONTINUED | OUTPATIENT
Start: 2017-07-19 | End: 2017-07-20

## 2017-07-19 RX ORDER — INSULIN GLARGINE 100 [IU]/ML
24 INJECTION, SOLUTION SUBCUTANEOUS EVERY MORNING
Qty: 0 | Refills: 0 | Status: DISCONTINUED | OUTPATIENT
Start: 2017-07-19 | End: 2017-07-20

## 2017-07-19 RX ADMIN — Medication: at 12:41

## 2017-07-19 RX ADMIN — CARVEDILOL PHOSPHATE 12.5 MILLIGRAM(S): 80 CAPSULE, EXTENDED RELEASE ORAL at 18:45

## 2017-07-19 RX ADMIN — SPIRONOLACTONE 25 MILLIGRAM(S): 25 TABLET, FILM COATED ORAL at 06:33

## 2017-07-19 RX ADMIN — Medication 10 MILLIGRAM(S): at 23:27

## 2017-07-19 RX ADMIN — Medication 650 MILLIGRAM(S): at 18:15

## 2017-07-19 RX ADMIN — ATORVASTATIN CALCIUM 40 MILLIGRAM(S): 80 TABLET, FILM COATED ORAL at 21:53

## 2017-07-19 RX ADMIN — Medication 100 GRAM(S): at 04:55

## 2017-07-19 RX ADMIN — LISINOPRIL 20 MILLIGRAM(S): 2.5 TABLET ORAL at 11:34

## 2017-07-19 RX ADMIN — Medication 50 MICROGRAM(S): at 06:33

## 2017-07-19 RX ADMIN — ENOXAPARIN SODIUM 40 MILLIGRAM(S): 100 INJECTION SUBCUTANEOUS at 11:33

## 2017-07-19 RX ADMIN — INSULIN GLARGINE 24 UNIT(S): 100 INJECTION, SOLUTION SUBCUTANEOUS at 09:34

## 2017-07-19 RX ADMIN — Medication 650 MILLIGRAM(S): at 07:47

## 2017-07-19 RX ADMIN — Medication 650 MILLIGRAM(S): at 17:46

## 2017-07-19 RX ADMIN — Medication 650 MILLIGRAM(S): at 08:39

## 2017-07-19 RX ADMIN — NYSTATIN CREAM 1 APPLICATION(S): 100000 CREAM TOPICAL at 21:54

## 2017-07-19 RX ADMIN — Medication 25 MILLIGRAM(S): at 21:54

## 2017-07-19 RX ADMIN — Medication 200 MILLIGRAM(S): at 11:35

## 2017-07-19 RX ADMIN — PANTOPRAZOLE SODIUM 40 MILLIGRAM(S): 20 TABLET, DELAYED RELEASE ORAL at 06:33

## 2017-07-19 RX ADMIN — Medication 1000 UNIT(S): at 11:34

## 2017-07-19 NOTE — PROGRESS NOTE ADULT - SUBJECTIVE AND OBJECTIVE BOX
CC: Follow up for HTN urgency    SUBJECTIVE / OVERNIGHT EVENTS:  No new complaints. Headache resolved.  No F/C, N/V, CP, SOB, Cough, lightheadedness, dizziness, abdominal pain, diarrhea, dysuria.    MEDICATIONS  (STANDING):  enoxaparin Injectable 40 milliGRAM(s) SubCutaneous every 24 hours  cloNIDine Patch 0.3 mG/24Hr(s) 1 patch Topical <User Schedule>  amitriptyline 25 milliGRAM(s) Oral at bedtime  insulin glargine Injectable (LANTUS) 24 Unit(s) SubCutaneous every morning  insulin lispro (HumaLOG) corrective regimen sliding scale   SubCutaneous three times a day before meals  insulin lispro (HumaLOG) corrective regimen sliding scale   SubCutaneous at bedtime  dextrose 5%. 1000 milliLiter(s) (50 mL/Hr) IV Continuous <Continuous>  dextrose 50% Injectable 12.5 Gram(s) IV Push once  dextrose 50% Injectable 25 Gram(s) IV Push once  dextrose 50% Injectable 25 Gram(s) IV Push once  allopurinol 200 milliGRAM(s) Oral daily  atorvastatin 40 milliGRAM(s) Oral at bedtime  pantoprazole    Tablet 40 milliGRAM(s) Oral before breakfast  levothyroxine 50 MICROGram(s) Oral daily  cholecalciferol 1000 Unit(s) Oral daily  spironolactone 25 milliGRAM(s) Oral daily  carvedilol 12.5 milliGRAM(s) Oral every 12 hours  nystatin Powder 1 Application(s) Topical two times a day  lisinopril 20 milliGRAM(s) Oral daily    MEDICATIONS  (PRN):  dextrose Gel 1 Dose(s) Oral once PRN Blood Glucose LESS THAN 70 milliGRAM(s)/deciliter  glucagon  Injectable 1 milliGRAM(s) IntraMuscular once PRN Glucose LESS THAN 70 milligrams/deciliter  ALBUTerol    90 MICROgram(s) HFA Inhaler 1 Puff(s) Inhalation every 4 hours PRN Shortness of Breath and/or Wheezing  acetaminophen   Tablet. 650 milliGRAM(s) Oral every 6 hours PRN Mild - Moderate Pain (1 - 6)      Vital Signs Last 24 Hrs  T(C): 36.7 (17 @ 09:31), Max: 36.8 (17 @ 19:43)  HR: 73 (17 @ 11:31) (66 - 87)  BP: 195/81 (17 @ 11:31) (158/70 - 247/78)  RR: 18 (17 @ 11:31) (17 - 19)  SpO2: 100% (17 @ 11:31) (96% - 100%)  CAPILLARY BLOOD GLUCOSE  137 (2017 08:37)        I&O's Summary      PHYSICAL EXAM:  GENERAL: NAD, well-developed  HEAD:  Atraumatic, Normocephalic  EYES: EOMI, PERRLA, conjunctiva and sclera clear  NECK: Supple, No JVD  CHEST/LUNG: Clear to auscultation bilaterally; No wheeze  HEART: Regular rate and rhythm; No murmurs, rubs, or gallops  ABDOMEN: Soft, Nontender, Nondistended; Bowel sounds present  EXTREMITIES:  2+ Peripheral Pulses, No clubbing, cyanosis, or edema  PSYCH: Calm  NEUROLOGY: A/Ox3, non-focal  SKIN: No rashes or lesions    LABS:                        12.0   8.89  )-----------( 266      ( 2017 05:51 )             37.6     -    143  |  106  |  12  ----------------------------<  171<H>  4.5   |  27  |  0.79    Ca    8.6      2017 05:51  Phos  2.6       Mg     1.4         TPro  6.3  /  Alb  3.5  /  TBili  0.5  /  DBili  x   /  AST  28  /  ALT  19  /  AlkPhos  86  -      CARDIAC MARKERS ( 2017 05:51 )  x     / < 0.06 ng/mL / 55 u/L / 1.20 ng/mL / x      CARDIAC MARKERS ( 2017 20:40 )  x     / < 0.06 ng/mL / 59 u/L / 1.12 ng/mL / x          Urinalysis Basic - ( 2017 23:07 )    Color: x / Appearance: CLEAR / S.003 / pH: 7.0  Gluc: NEGATIVE / Ketone: NEGATIVE  / Bili: NEGATIVE / Urobili: NORMAL E.U.   Blood: NEGATIVE / Protein: NEGATIVE / Nitrite: NEGATIVE   Leuk Esterase: LARGE / RBC: 0-2 / WBC 5-10   Sq Epi: x / Non Sq Epi: x / Bacteria: FEW        RADIOLOGY & ADDITIONAL TESTS:    Imaging Personally Reviewed:    Consultant(s) Notes Reviewed:      Care Discussed with Consultants/Other Providers:

## 2017-07-19 NOTE — H&P ADULT - HISTORY OF PRESENT ILLNESS
Patient is a 89 year old woman with HTN, HLD, GERD, gout, hypothyroidism, recent right hip hemiarthroplasty, type 2 diabetes who comes to ED after multiple visits with outpatient providers (ophthalmologist/PMD) having SBP in 200s. Patient has been taking her medications daily, she states she avoids salty foods however endorses having things like Chinese take out once in a while because "it isn't so bad for you." She did meet with her PMD who suggested checking BP TID and brings with her paper record which shows BP in range 160-200s at various times of day. She currently has clonidine patch on her and changes it Fridays. Pt had admission in April 2017 at [facility] for oliguria in setting of using four diuretic medications and was found to have FLETCHER which resolved. Currently she denies CP, pain in jaw or arm, abdominal pain, N/V/D. She does endorse LE pain that she thinks is diabetic neuropathy and mild headache however is aware CT head is normal and appears to be minimizing her symptoms. She denies dysuria/abdominal pain. Pt denies recent stress but states she might be stressed out about President Emmanuel and has strong feelings when she sees him on TV. 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 She is not sure of her medications but states nothing has changed since her last admission at [facility].     In ED: Vital Signs Last 24 Hrs  T(C): 36.6 (19 Jul 2017 05:20), Max: 36.8 (18 Jul 2017 19:43)  T(F): 97.8 (19 Jul 2017 05:20), Max: 98.2 (18 Jul 2017 19:43)  HR: 77 (19 Jul 2017 05:20) (66 - 87)  BP: 191/81 (19 Jul 2017 05:20) (184/70 - 247/78)  BP(mean): --  RR: 17 (19 Jul 2017 05:20) (17 - 19)  SpO2: 96% (19 Jul 2017 05:20) (96% - 99%)  Patient received IV hydralazine and IV labetalol with improvement in MAP from 134 to 103 which is a 23% reduction. Most recent BP however shows  however SBP is decreased. Patient is a 89 year old woman with HTN, HLD, GERD, gout, hypothyroidism, recent right hip hemiarthroplasty, type 2 diabetes who comes to ED after multiple visits with outpatient providers (ophthalmologist/PMD) having SBP in 200s. Patient has been taking her medications daily, she states she avoids salty foods however endorses having things like Chinese take out once in a while because "it isn't so bad for you." She did meet with her PMD who suggested checking BP TID and brings with her paper record which shows BP in range 160-200s at various times of day. She currently has clonidine patch on her and changes it Fridays. Pt had admission in April 2017 at [facility] for oliguria in setting of using four diuretic medications and was found to have FLETCHER which resolved. Currently she denies CP, pain in jaw or arm, abdominal pain, N/V/D. She does endorse LE pain that she thinks is diabetic neuropathy and mild headache however is aware CT head is normal and appears to be minimizing her symptoms. She denies dysuria/abdominal pain. Pt denies recent stress but states she might be stressed out about President Emmanuel and has strong feelings when she sees him on TV. 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 She is not sure of her medications but states nothing has changed since her last admission at [facility].     In ED: Vital Signs Last 24 Hrs  T(C): 36.6 (19 Jul 2017 05:20), Max: 36.8 (18 Jul 2017 19:43)  T(F): 97.8 (19 Jul 2017 05:20), Max: 98.2 (18 Jul 2017 19:43)  HR: 77 (19 Jul 2017 05:20) (66 - 87)  BP: 191/81 (19 Jul 2017 05:20) (184/70 - 247/78)  BP(mean): --  RR: 17 (19 Jul 2017 05:20) (17 - 19)  SpO2: 96% (19 Jul 2017 05:20) (96% - 99%)  Patient received IV hydralazine and IV labetalol with improvement in MAP from 134 to 103 which is a 23% reduction.

## 2017-07-19 NOTE — PATIENT PROFILE ADULT. - VISION (WITH CORRECTIVE LENSES IF THE PATIENT USUALLY WEARS THEM):
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

## 2017-07-19 NOTE — CONSULT NOTE ADULT - PROBLEM SELECTOR RECOMMENDATION 9
Patient came, asymptomatic with elevated BP at 240/95 which has now improved to 190/94 at bedside. Patient is restarted on her home oral medications. Would recommend to continue to monitor BP. Avoid decreasing greater then 25%

## 2017-07-19 NOTE — H&P ADULT - PROBLEM SELECTOR PLAN 5
-c/w lantus 24 u in AM  -hold metformin  -insulin sliding scale, check FS -c/w lantus 24 u in AM  -hold metformin  -insulin sliding scale, check FS  -last discharge note unclear on insulin regimen; will continue AM lantus only for now, monitor FS.

## 2017-07-19 NOTE — H&P ADULT - PROBLEM SELECTOR PLAN 1
-CTH negative for infarct, or hemorrhage -CTH negative for infarct, or hemorrhage  -restart antihypertensives, patient currently has clonidine patch on her which was changed Friday  -hold antihypertensives for SBP < 150 to avoid overcorrection of BP -CTH negative for infarct, or hemorrhage  -restart antihypertensives, patient currently has clonidine patch on her which was changed Friday  -hold antihypertensives for SBP < 150 to avoid overcorrection of BP  -most recent /81 ( is 14% reduction from admission); goal MAP for first day is around 100 (25% reduction) -CTH negative for infarct, or hemorrhage  -restart antihypertensives, patient currently has clonidine patch on her which was changed Friday  -hold antihypertensives for SBP < 150 to avoid overcorrection of BP  -most recent /81 ( is 14% reduction from admission); goal MAP for first day is around 100 (25% reduction)  -no signs of end organ damage; creatinine/liver enzymes WNL and Bossman neg x 1; f/u repeat enzymes. -CTH negative for infarct, or hemorrhage  -restart antihypertensives, patient currently has clonidine patch on her which was changed Friday  -hold antihypertensives for SBP < 150 to avoid overcorrection of BP  -most recent /81 ( is 14% reduction from admission); goal MAP for first day is around 100 (25% reduction)  -no signs of end organ damage; creatinine/liver enzymes WNL and Bossman neg x 1; f/u repeat enzymes.  -obtain medication list from patient's daughter -CTH negative for infarct, or hemorrhage  -restart antihypertensives, patient currently has clonidine patch on her which was changed Friday  -hold antihypertensives for SBP < 150 to avoid overcorrection of BP  -most recent /81 ( is 14% reduction from admission); goal MAP for first day is around 100 (25% reduction)  -no signs of end organ damage; creatinine/liver enzymes WNL and Bossman neg x 1; f/u repeat enzymes.  -obtain medication list from patient's daughter and call PMD for collateral information on adherence to medication regimen

## 2017-07-19 NOTE — H&P ADULT - NSHPPHYSICALEXAM_GEN_ALL_CORE
Vital Signs Last 24 Hrs  T(C): 36.6 (19 Jul 2017 05:20), Max: 36.8 (18 Jul 2017 19:43)  T(F): 97.8 (19 Jul 2017 05:20), Max: 98.2 (18 Jul 2017 19:43)  HR: 77 (19 Jul 2017 05:20) (66 - 87)  BP: 191/81 (19 Jul 2017 05:20) (184/70 - 247/78)  BP(mean): --  RR: 17 (19 Jul 2017 05:20) (17 - 19)  SpO2: 96% (19 Jul 2017 05:20) (96% - 99%)    Constitutional: NAD; appears younger than stated age  Eyes: clear conjunctiva  ENMT: dry oral mucosa  Respiratory: CTA b/l  Cardiovascular: S1, S2, RR  Gastrointestinal: Soft, NT, ND  Extremities: 1+ pitting LE edema, mild erythema of anterior shins bilaterally, tenderness to touch  Neurological: AAO x 3  Skin: warm, dry  Psychiatric: normal affect, very talkative. Vital Signs Last 24 Hrs  T(C): 36.6 (19 Jul 2017 05:20), Max: 36.8 (18 Jul 2017 19:43)  T(F): 97.8 (19 Jul 2017 05:20), Max: 98.2 (18 Jul 2017 19:43)  HR: 77 (19 Jul 2017 05:20) (66 - 87)  BP: 191/81 (19 Jul 2017 05:20) (184/70 - 247/78)  BP(mean): --  RR: 17 (19 Jul 2017 05:20) (17 - 19)  SpO2: 96% (19 Jul 2017 05:20) (96% - 99%)    Constitutional: NAD; appears younger than stated age  Eyes: clear conjunctiva  ENMT: dry oral mucosa  Respiratory: CTA b/l, no wheezing, rales, rhonchi  Cardiovascular: S1, S2, RRR, no murmurs  Gastrointestinal: Soft, NT, ND, +BS  Extremities: 1+ pitting LE edema, minimal erythema of anterior shins bilaterally, tenderness to touch  Neurological: AAO x 3, 5/5 strength bilaterally, CN III-XII intact  Skin: warm, dry; hyperpigmentation in abdomen pannus (reported pruritic); 0.5cm circular papule on mons (previously draining pus per patient, now healing, no drainage)  Psychiatric: normal affect, very talkative.

## 2017-07-19 NOTE — CONSULT NOTE ADULT - SUBJECTIVE AND OBJECTIVE BOX
[facility] DIVISION OF KIDNEY DISEASES AND HYPERTENSION -- INITIAL CONSULT NOTE  --------------------------------------------------------------------------------  HPI: 89 year old woman with HTN, HLD, GERD, gout, hypothyroidism, recent right hip hemiarthroplasty (January 2017), type 2 diabetes complicated by retinopathy, who comes to ED with complaints of elevated BP for the last two days. Patient states she had spoke to her outpatient physician on 7/11 after having an elevated BP readings at her ophthalmologist's office. Since then, she has been taking her BP daily three times a day with her daughters assistance. She noted the last two days her BP has remained elevated despite taking her medications routinely. She denies any CP, SOB, lightheadedness or LE edema. Patient had no recent changes to her medications. Patient states she had an episode of renal failure in the past when she was placed on diuretic therapy in April 2017 which improved once she was taken off the diuretic. Denies other  hx of kidney disease or biopsies done.         PAST HISTORY  --------------------------------------------------------------------------------  PAST MEDICAL & SURGICAL HISTORY:  Gastroesophageal reflux disease, esophagitis presence not specified  Hypothyroidism, unspecified type  Gout  Hyperlipidemia, unspecified hyperlipidemia type  Diabetes  Hypertension  Paraumbilical hernia  S/P Tonsillectomy and Adenoidectomy  S/P Tonsillectomy and Adenoidectomy    FAMILY HISTORY:  No pertinent family history in first degree relatives    PAST SOCIAL HISTORY:    ALLERGIES & MEDICATIONS  --------------------------------------------------------------------------------  Allergies    No Known Allergies    Intolerances      Standing Inpatient Medications  enoxaparin Injectable 40 milliGRAM(s) SubCutaneous every 24 hours  cloNIDine Patch 0.3 mG/24Hr(s) 1 patch Topical <User Schedule>  amitriptyline 25 milliGRAM(s) Oral at bedtime  insulin glargine Injectable (LANTUS) 24 Unit(s) SubCutaneous every morning  insulin lispro (HumaLOG) corrective regimen sliding scale   SubCutaneous three times a day before meals  insulin lispro (HumaLOG) corrective regimen sliding scale   SubCutaneous at bedtime  dextrose 5%. 1000 milliLiter(s) IV Continuous <Continuous>  dextrose 50% Injectable 12.5 Gram(s) IV Push once  dextrose 50% Injectable 25 Gram(s) IV Push once  dextrose 50% Injectable 25 Gram(s) IV Push once  allopurinol 200 milliGRAM(s) Oral daily  atorvastatin 40 milliGRAM(s) Oral at bedtime  pantoprazole    Tablet 40 milliGRAM(s) Oral before breakfast  levothyroxine 50 MICROGram(s) Oral daily  cholecalciferol 1000 Unit(s) Oral daily  spironolactone 25 milliGRAM(s) Oral daily  carvedilol 12.5 milliGRAM(s) Oral every 12 hours  nystatin Powder 1 Application(s) Topical two times a day  lisinopril 20 milliGRAM(s) Oral daily    PRN Inpatient Medications  dextrose Gel 1 Dose(s) Oral once PRN  glucagon  Injectable 1 milliGRAM(s) IntraMuscular once PRN  ALBUTerol    90 MICROgram(s) HFA Inhaler 1 Puff(s) Inhalation every 4 hours PRN  acetaminophen   Tablet. 650 milliGRAM(s) Oral every 6 hours PRN      REVIEW OF SYSTEMS  --------------------------------------------------------------------------------  Gen: No weakness  Respiratory: No dyspnea  CV: No chest pain  GI: No abdominal pain, diarrhea  : No increased frequency, dysuria  MSK: No edema    All other systems were reviewed and are negative, except as noted.    VITALS/PHYSICAL EXAM  --------------------------------------------------------------------------------  T(C): 37.1 (07-19-17 @ 14:18), Max: 37.1 (07-19-17 @ 14:18)  HR: 72 (07-19-17 @ 14:18) (66 - 87)  BP: 195/89 (07-19-17 @ 14:18) (158/70 - 247/78)  RR: 18 (07-19-17 @ 14:18) (17 - 19)  SpO2: 99% (07-19-17 @ 14:18) (96% - 100%)  Wt(kg): --        Physical Exam:  	Gen: NAD, well-appearing  	Pulm: CTA B/L  	CV: RRR, S1S2; no rub  	Abd: +BS, soft, nontender/nondistended  	: No suprapubic tenderness  	LE: Warm, no edema      LABS/STUDIES  --------------------------------------------------------------------------------              12.0   8.89  >-----------<  266      [07-19-17 @ 05:51]              37.6     143  |  106  |  12  ----------------------------<  171      [07-19-17 @ 05:51]  4.5   |  27  |  0.79        Ca     8.6     [07-19-17 @ 05:51]      Mg     1.4     [07-19-17 @ 05:51]      Phos  2.6     [07-19-17 @ 05:51]    TPro  6.3  /  Alb  3.5  /  TBili  0.5  /  DBili  x   /  AST  28  /  ALT  19  /  AlkPhos  86  [07-19-17 @ 05:51]        Troponin < 0.06      [07-19-17 @ 05:51]  CK 55      [07-19-17 @ 05:51]    Creatinine Trend:  SCr 0.79 [07-19 @ 05:51]  SCr 0.89 [07-18 @ 20:40]    Urinalysis - [07-18-17 @ 23:07]      Color  / Appearance CLEAR / SG 1.003 / pH 7.0      Gluc NEGATIVE / Ketone NEGATIVE  / Bili NEGATIVE / Urobili NORMAL       Blood NEGATIVE / Protein NEGATIVE / Leuk Est LARGE / Nitrite NEGATIVE      RBC 0-2 / WBC 5-10 / Hyaline  / Gran  / Sq Epi  / Non Sq Epi  / Bacteria FEW      HbA1c 7.2      [04-24-17 @ 06:40]  TSH 6.75      [01-16-17 @ 09:39]  Lipid: chol 123, , HDL 40, LDL 61      [01-16-17 @ 09:39] 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 Patient states she had spoke to her outpatient physician on 7/11 after having an elevated BP readings at her ophthalmologist's office. Since then, she has been taking her BP daily three times a day with her daughters assistance. She noted the last two days her BP has remained elevated despite taking her medications routinely. She denies any CP, SOB, lightheadedness or LE edema. Patient had no recent changes to her medications. Patient states she had an episode of renal failure in the past when she was placed on diuretic therapy in April 2017 which improved once she was taken off the diuretic. Denies other  hx of kidney disease or biopsies done.         PAST HISTORY  --------------------------------------------------------------------------------  PAST MEDICAL & SURGICAL HISTORY:  Gastroesophageal reflux disease, esophagitis presence not specified  Hypothyroidism, unspecified type  Gout  Hyperlipidemia, unspecified hyperlipidemia type  Diabetes  Hypertension  Paraumbilical hernia  S/P Tonsillectomy and Adenoidectomy  S/P Tonsillectomy and Adenoidectomy    FAMILY HISTORY:  No pertinent family history in first degree relatives    PAST SOCIAL HISTORY:    ALLERGIES & MEDICATIONS  --------------------------------------------------------------------------------  Allergies    No Known Allergies    Intolerances      Standing Inpatient Medications  enoxaparin Injectable 40 milliGRAM(s) SubCutaneous every 24 hours  cloNIDine Patch 0.3 mG/24Hr(s) 1 patch Topical <User Schedule>  amitriptyline 25 milliGRAM(s) Oral at bedtime  insulin glargine Injectable (LANTUS) 24 Unit(s) SubCutaneous every morning  insulin lispro (HumaLOG) corrective regimen sliding scale   SubCutaneous three times a day before meals  insulin lispro (HumaLOG) corrective regimen sliding scale   SubCutaneous at bedtime  dextrose 5%. 1000 milliLiter(s) IV Continuous <Continuous>  dextrose 50% Injectable 12.5 Gram(s) IV Push once  dextrose 50% Injectable 25 Gram(s) IV Push once  dextrose 50% Injectable 25 Gram(s) IV Push once  allopurinol 200 milliGRAM(s) Oral daily  atorvastatin 40 milliGRAM(s) Oral at bedtime  pantoprazole    Tablet 40 milliGRAM(s) Oral before breakfast  levothyroxine 50 MICROGram(s) Oral daily  cholecalciferol 1000 Unit(s) Oral daily  spironolactone 25 milliGRAM(s) Oral daily  carvedilol 12.5 milliGRAM(s) Oral every 12 hours  nystatin Powder 1 Application(s) Topical two times a day  lisinopril 20 milliGRAM(s) Oral daily    PRN Inpatient Medications  dextrose Gel 1 Dose(s) Oral once PRN  glucagon  Injectable 1 milliGRAM(s) IntraMuscular once PRN  ALBUTerol    90 MICROgram(s) HFA Inhaler 1 Puff(s) Inhalation every 4 hours PRN  acetaminophen   Tablet. 650 milliGRAM(s) Oral every 6 hours PRN      REVIEW OF SYSTEMS  --------------------------------------------------------------------------------  Gen: No weakness  Respiratory: No dyspnea  CV: No chest pain  GI: No abdominal pain, diarrhea  : No increased frequency, dysuria  MSK: No joint pain  Neuro: no headache  Skin: no rash    All other systems were reviewed and are negative, except as noted.    VITALS/PHYSICAL EXAM  --------------------------------------------------------------------------------  T(C): 37.1 (07-19-17 @ 14:18), Max: 37.1 (07-19-17 @ 14:18)  HR: 72 (07-19-17 @ 14:18) (66 - 87)  BP: 195/89 (07-19-17 @ 14:18) (158/70 - 247/78)  RR: 18 (07-19-17 @ 14:18) (17 - 19)  SpO2: 99% (07-19-17 @ 14:18) (96% - 100%)  Wt(kg): --        Physical Exam:  	Gen: NAD, well-appearing  	Pulm: CTA B/L  	CV: RRR, S1S2; no rub  	Abd: +BS, soft, nontender/nondistended  	: No suprapubic tenderness  	LE: Warm, no edema              Neuro: exam was grossly non focal              Vasc: no cyanosis              Psych: appropriate      LABS/STUDIES  --------------------------------------------------------------------------------              12.0   8.89  >-----------<  266      [07-19-17 @ 05:51]              37.6     143  |  106  |  12  ----------------------------<  171      [07-19-17 @ 05:51]  4.5   |  27  |  0.79        Ca     8.6     [07-19-17 @ 05:51]      Mg     1.4     [07-19-17 @ 05:51]      Phos  2.6     [07-19-17 @ 05:51]    TPro  6.3  /  Alb  3.5  /  TBili  0.5  /  DBili  x   /  AST  28  /  ALT  19  /  AlkPhos  86  [07-19-17 @ 05:51]        Troponin < 0.06      [07-19-17 @ 05:51]  CK 55      [07-19-17 @ 05:51]    Creatinine Trend:  SCr 0.79 [07-19 @ 05:51]  SCr 0.89 [07-18 @ 20:40]    Urinalysis - [07-18-17 @ 23:07]      Color  / Appearance CLEAR / SG 1.003 / pH 7.0      Gluc NEGATIVE / Ketone NEGATIVE  / Bili NEGATIVE / Urobili NORMAL       Blood NEGATIVE / Protein NEGATIVE / Leuk Est LARGE / Nitrite NEGATIVE      RBC 0-2 / WBC 5-10 / Hyaline  / Gran  / Sq Epi  / Non Sq Epi  / Bacteria FEW      HbA1c 7.2      [04-24-17 @ 06:40]  TSH 6.75      [01-16-17 @ 09:39]  Lipid: chol 123, , HDL 40, LDL 61      [01-16-17 @ 09:39]

## 2017-07-19 NOTE — H&P ADULT - NSHPREVIEWOFSYSTEMS_GEN_ALL_CORE
General: no fever, no chills  Ophthalmologic: no change in vision, chronic L eye vision loss  ENMT: no sore throat  Respiratory and Thorax: no SOB, no cough  Cardiovascular: no CP  Gastrointestinal: no abd pain, N/V/D  Genitourinary: no dysuria  Musculoskeletal: no joint pain  Neurological: mild headache  Psychiatric: no anxiety/depression, stress related to politics.   Hematology/Lymphatics: no abnormal bleeding  Endocrine: no changes in weight General: no fever, no chills  Ophthalmologic: no change in vision, chronic L eye vision loss  ENMT: no sore throat  Respiratory and Thorax: no SOB, no cough  Cardiovascular: no chest pain, palpitations   Gastrointestinal: no abd pain, N/V/D  Genitourinary: no dysuria  Musculoskeletal: no joint pain  Neurological: mild headache, no vision changes  Psychiatric: no anxiety/depression, stress related to politics.   Hematology/Lymphatics: no abnormal bleeding  Endocrine: no changes in weight

## 2017-07-19 NOTE — CONSULT NOTE ADULT - ASSESSMENT
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

## 2017-07-19 NOTE — H&P ADULT - NSHPSOCIALHISTORY_GEN_ALL_CORE
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

## 2017-07-19 NOTE — PROGRESS NOTE ADULT - PROBLEM SELECTOR PLAN 1
Currently on clonidine patch, spironolactone, coreg, lisinopril.  Will obtain renal consult as this BP control issue is chronic.

## 2017-07-19 NOTE — CONSULT NOTE ADULT - ATTENDING COMMENTS
I agree with the above note.  I would recommend changing to shorter acting medications for now consider changing coreg to labetalol 200 TID with holding parameters hold SBP < 160.  Need to avoid drastic drop in blood pressure.  Recommend check renal artery dopplers as well as renin and cheyanne.

## 2017-07-19 NOTE — H&P ADULT - NSHPLABSRESULTS_GEN_ALL_CORE
11.4   7.42  )-----------( 254      ( 2017 20:40 )             35.9     07-18    141  |  103  |  12  ----------------------------<  152<H>  4.5   |  25  |  0.89    Ca    8.6      2017 20:40  Phos  2.8       Mg     1.2         TPro  6.4  /  Alb  3.4  /  TBili  0.6  /  DBili  x   /  AST  31  /  ALT  21  /  AlkPhos  90        Urinalysis Basic - ( 2017 23:07 )    Color: x / Appearance: CLEAR / S.003 / pH: 7.0  Gluc: NEGATIVE / Ketone: NEGATIVE  / Bili: NEGATIVE / Urobili: NORMAL E.U.   Blood: NEGATIVE / Protein: NEGATIVE / Nitrite: NEGATIVE   Leuk Esterase: LARGE / RBC: 0-2 / WBC 5-10   Sq Epi: x / Non Sq Epi: x / Bacteria: FEW    < from: CT Head No Cont (17 @ 21:18) >      IMPRESSION:    No acute intracranial hemorrhage, mass effect, or CT evidence of an acute   vascular territorial infarct.    < end of copied text > 11.4   7.42  )-----------( 254      ( 2017 20:40 )             35.9     07-18    141  |  103  |  12  ----------------------------<  152<H>  4.5   |  25  |  0.89    Ca    8.6      2017 20:40  Phos  2.8       Mg     1.2         TPro  6.4  /  Alb  3.4  /  TBili  0.6  /  DBili  x   /  AST  31  /  ALT  21  /  AlkPhos  90        Urinalysis Basic - ( 2017 23:07 )    Color: x / Appearance: CLEAR / S.003 / pH: 7.0  Gluc: NEGATIVE / Ketone: NEGATIVE  / Bili: NEGATIVE / Urobili: NORMAL E.U.   Blood: NEGATIVE / Protein: NEGATIVE / Nitrite: NEGATIVE   Leuk Esterase: LARGE / RBC: 0-2 / WBC 5-10   Sq Epi: x / Non Sq Epi: x / Bacteria: FEW    < from: CT Head No Cont (17 @ 21:18) >      IMPRESSION:    No acute intracranial hemorrhage, mass effect, or CT evidence of an acute   vascular territorial infarct.    < end of copied text >    EKG - RBB and first degree AV block (first deg AV block is new although last AL in EKG is 192) 11.4   7.42  )-----------( 254      ( 2017 20:40 )             35.9     07-18    141  |  103  |  12  ----------------------------<  152<H>  4.5   |  25  |  0.89    Ca    8.6      2017 20:40  Phos  2.8       Mg     1.2         TPro  6.4  /  Alb  3.4  /  TBili  0.6  /  DBili  x   /  AST  31  /  ALT  21  /  AlkPhos  90      Urinalysis Basic - ( 2017 23:07 )  Color: x / Appearance: CLEAR / S.003 / pH: 7.0  Gluc: NEGATIVE / Ketone: NEGATIVE  / Bili: NEGATIVE / Urobili: NORMAL E.U.   Blood: NEGATIVE / Protein: NEGATIVE / Nitrite: NEGATIVE   Leuk Esterase: LARGE / RBC: 0-2 / WBC 5-10   Sq Epi: x / Non Sq Epi: x / Bacteria: FEW    < from: CT Head No Cont (17 @ 21:18) >  IMPRESSION:  No acute intracranial hemorrhage, mass effect, or CT evidence of an acute   vascular territorial infarct.  < end of copied text >    EKG - RBB and first degree AV block (first deg AV block is new although last WA in EKG is 192)

## 2017-07-19 NOTE — H&P ADULT - ATTENDING COMMENTS
Briefly, 89 -year-old female w/HTN, HLD, GERD, gout, hypothyroidism, IDDM2, presenting from home with poorly controlled HTN, only complaint of HA at present this AM, mild/frontal.  On exam, non-focal neuro exam, no TTP of temporal arteries.  Patient reportedly taking all meds as prescribed, will need to f/u med list to be brought in by daughter, for now restarted meds from last admission/discharge.  Call PMD in AM for collateral info.  Goal BP today decrease of 20% from max. Per patient, no associated diaphoresis, nausea, or chest pain when BP is elevated.  Suspect medication issue, however will need to f/u with PMD if alternate diagnoses for HTN have been pursued/worked-up in the past.    Agree with H&P and plan above as edited.

## 2017-07-19 NOTE — H&P ADULT - PROBLEM SELECTOR PLAN 7
-lovenox for dvt ppx asymptomatic  repeat UA, monitor off abx (prior UCx with pyuria and negative UCx)

## 2017-07-19 NOTE — H&P ADULT - ASSESSMENT
89 year old woman with HTN, HLD, GERD, gout, hypothyroidism, recent right hip hemiarthroplasty, type 2 diabetes admitted for hypertensive urgency.

## 2017-07-20 ENCOUNTER — TRANSCRIPTION ENCOUNTER (OUTPATIENT)
Age: 82
End: 2017-07-20

## 2017-07-20 VITALS
TEMPERATURE: 98 F | DIASTOLIC BLOOD PRESSURE: 78 MMHG | OXYGEN SATURATION: 97 % | RESPIRATION RATE: 18 BRPM | SYSTOLIC BLOOD PRESSURE: 150 MMHG | HEART RATE: 79 BPM

## 2017-07-20 LAB
ALBUMIN SERPL ELPH-MCNC: 3.2 G/DL — LOW (ref 3.3–5)
ALP SERPL-CCNC: 74 U/L — SIGNIFICANT CHANGE UP (ref 40–120)
ALT FLD-CCNC: 18 U/L — SIGNIFICANT CHANGE UP (ref 4–33)
AST SERPL-CCNC: 21 U/L — SIGNIFICANT CHANGE UP (ref 4–32)
BASOPHILS # BLD AUTO: 0.07 K/UL — SIGNIFICANT CHANGE UP (ref 0–0.2)
BASOPHILS NFR BLD AUTO: 0.9 % — SIGNIFICANT CHANGE UP (ref 0–2)
BILIRUB SERPL-MCNC: 0.6 MG/DL — SIGNIFICANT CHANGE UP (ref 0.2–1.2)
BUN SERPL-MCNC: 12 MG/DL — SIGNIFICANT CHANGE UP (ref 7–23)
CALCIUM SERPL-MCNC: 8.7 MG/DL — SIGNIFICANT CHANGE UP (ref 8.4–10.5)
CHLORIDE SERPL-SCNC: 106 MMOL/L — SIGNIFICANT CHANGE UP (ref 98–107)
CO2 SERPL-SCNC: 24 MMOL/L — SIGNIFICANT CHANGE UP (ref 22–31)
CREAT SERPL-MCNC: 0.88 MG/DL — SIGNIFICANT CHANGE UP (ref 0.5–1.3)
EOSINOPHIL # BLD AUTO: 0.72 K/UL — HIGH (ref 0–0.5)
EOSINOPHIL NFR BLD AUTO: 9.6 % — HIGH (ref 0–6)
GLUCOSE SERPL-MCNC: 138 MG/DL — HIGH (ref 70–99)
HBA1C BLD-MCNC: 6.7 % — HIGH (ref 4–5.6)
HCT VFR BLD CALC: 36 % — SIGNIFICANT CHANGE UP (ref 34.5–45)
HGB BLD-MCNC: 11.7 G/DL — SIGNIFICANT CHANGE UP (ref 11.5–15.5)
IMM GRANULOCYTES # BLD AUTO: 0.02 # — SIGNIFICANT CHANGE UP
IMM GRANULOCYTES NFR BLD AUTO: 0.3 % — SIGNIFICANT CHANGE UP (ref 0–1.5)
LYMPHOCYTES # BLD AUTO: 1.75 K/UL — SIGNIFICANT CHANGE UP (ref 1–3.3)
LYMPHOCYTES # BLD AUTO: 23.3 % — SIGNIFICANT CHANGE UP (ref 13–44)
MAGNESIUM SERPL-MCNC: 1.4 MG/DL — LOW (ref 1.6–2.6)
MCHC RBC-ENTMCNC: 28.4 PG — SIGNIFICANT CHANGE UP (ref 27–34)
MCHC RBC-ENTMCNC: 32.5 % — SIGNIFICANT CHANGE UP (ref 32–36)
MCV RBC AUTO: 87.4 FL — SIGNIFICANT CHANGE UP (ref 80–100)
MONOCYTES # BLD AUTO: 0.8 K/UL — SIGNIFICANT CHANGE UP (ref 0–0.9)
MONOCYTES NFR BLD AUTO: 10.6 % — SIGNIFICANT CHANGE UP (ref 2–14)
NEUTROPHILS # BLD AUTO: 4.16 K/UL — SIGNIFICANT CHANGE UP (ref 1.8–7.4)
NEUTROPHILS NFR BLD AUTO: 55.3 % — SIGNIFICANT CHANGE UP (ref 43–77)
NRBC # FLD: 0 — SIGNIFICANT CHANGE UP
PHOSPHATE SERPL-MCNC: 3 MG/DL — SIGNIFICANT CHANGE UP (ref 2.5–4.5)
PLATELET # BLD AUTO: 248 K/UL — SIGNIFICANT CHANGE UP (ref 150–400)
PMV BLD: 10.8 FL — SIGNIFICANT CHANGE UP (ref 7–13)
POTASSIUM SERPL-MCNC: 3.7 MMOL/L — SIGNIFICANT CHANGE UP (ref 3.5–5.3)
POTASSIUM SERPL-SCNC: 3.7 MMOL/L — SIGNIFICANT CHANGE UP (ref 3.5–5.3)
PROT SERPL-MCNC: 6.1 G/DL — SIGNIFICANT CHANGE UP (ref 6–8.3)
RBC # BLD: 4.12 M/UL — SIGNIFICANT CHANGE UP (ref 3.8–5.2)
RBC # FLD: 16.2 % — HIGH (ref 10.3–14.5)
SODIUM SERPL-SCNC: 143 MMOL/L — SIGNIFICANT CHANGE UP (ref 135–145)
WBC # BLD: 7.52 K/UL — SIGNIFICANT CHANGE UP (ref 3.8–10.5)
WBC # FLD AUTO: 7.52 K/UL — SIGNIFICANT CHANGE UP (ref 3.8–10.5)

## 2017-07-20 PROCEDURE — 99233 SBSQ HOSP IP/OBS HIGH 50: CPT | Mod: GC

## 2017-07-20 PROCEDURE — 99239 HOSP IP/OBS DSCHRG MGMT >30: CPT

## 2017-07-20 RX ORDER — SENNA PLUS 8.6 MG/1
2 TABLET ORAL AT BEDTIME
Qty: 0 | Refills: 0 | Status: DISCONTINUED | OUTPATIENT
Start: 2017-07-20 | End: 2017-07-20

## 2017-07-20 RX ORDER — MAGNESIUM SULFATE 500 MG/ML
2 VIAL (ML) INJECTION ONCE
Qty: 0 | Refills: 0 | Status: COMPLETED | OUTPATIENT
Start: 2017-07-20 | End: 2017-07-20

## 2017-07-20 RX ORDER — LABETALOL HCL 100 MG
1 TABLET ORAL
Qty: 0 | Refills: 0 | COMMUNITY
Start: 2017-07-20

## 2017-07-20 RX ORDER — METFORMIN HYDROCHLORIDE 850 MG/1
1 TABLET ORAL
Qty: 0 | Refills: 0 | COMMUNITY

## 2017-07-20 RX ORDER — LABETALOL HCL 100 MG
1 TABLET ORAL
Qty: 90 | Refills: 0
Start: 2017-07-20 | End: 2017-08-19

## 2017-07-20 RX ORDER — ALLOPURINOL 300 MG
2 TABLET ORAL
Qty: 0 | Refills: 0 | DISCHARGE
Start: 2017-07-20

## 2017-07-20 RX ORDER — SENNA PLUS 8.6 MG/1
2 TABLET ORAL
Qty: 0 | Refills: 0 | DISCHARGE
Start: 2017-07-20

## 2017-07-20 RX ORDER — ACETAMINOPHEN 500 MG
2 TABLET ORAL
Qty: 0 | Refills: 0 | DISCHARGE
Start: 2017-07-20

## 2017-07-20 RX ORDER — NYSTATIN CREAM 100000 [USP'U]/G
1 CREAM TOPICAL
Qty: 0 | Refills: 0 | DISCHARGE
Start: 2017-07-20

## 2017-07-20 RX ORDER — ENOXAPARIN SODIUM 100 MG/ML
24 INJECTION SUBCUTANEOUS
Qty: 0 | Refills: 0 | COMMUNITY

## 2017-07-20 RX ORDER — DOCUSATE SODIUM 100 MG
100 CAPSULE ORAL DAILY
Qty: 0 | Refills: 0 | Status: DISCONTINUED | OUTPATIENT
Start: 2017-07-20 | End: 2017-07-20

## 2017-07-20 RX ORDER — LABETALOL HCL 100 MG
200 TABLET ORAL THREE TIMES A DAY
Qty: 0 | Refills: 0 | Status: DISCONTINUED | OUTPATIENT
Start: 2017-07-20 | End: 2017-07-20

## 2017-07-20 RX ORDER — ALLOPURINOL 300 MG
1 TABLET ORAL
Qty: 0 | Refills: 0 | COMMUNITY

## 2017-07-20 RX ADMIN — INSULIN GLARGINE 24 UNIT(S): 100 INJECTION, SOLUTION SUBCUTANEOUS at 08:04

## 2017-07-20 RX ADMIN — ENOXAPARIN SODIUM 40 MILLIGRAM(S): 100 INJECTION SUBCUTANEOUS at 13:15

## 2017-07-20 RX ADMIN — Medication 1000 UNIT(S): at 13:16

## 2017-07-20 RX ADMIN — Medication 50 GRAM(S): at 10:31

## 2017-07-20 RX ADMIN — Medication 50 MICROGRAM(S): at 05:31

## 2017-07-20 RX ADMIN — SPIRONOLACTONE 25 MILLIGRAM(S): 25 TABLET, FILM COATED ORAL at 05:31

## 2017-07-20 RX ADMIN — CARVEDILOL PHOSPHATE 12.5 MILLIGRAM(S): 80 CAPSULE, EXTENDED RELEASE ORAL at 05:31

## 2017-07-20 RX ADMIN — LISINOPRIL 20 MILLIGRAM(S): 2.5 TABLET ORAL at 05:31

## 2017-07-20 RX ADMIN — Medication 200 MILLIGRAM(S): at 13:17

## 2017-07-20 RX ADMIN — PANTOPRAZOLE SODIUM 40 MILLIGRAM(S): 20 TABLET, DELAYED RELEASE ORAL at 05:31

## 2017-07-20 RX ADMIN — Medication 100 MILLIGRAM(S): at 05:54

## 2017-07-20 RX ADMIN — NYSTATIN CREAM 1 APPLICATION(S): 100000 CREAM TOPICAL at 05:30

## 2017-07-20 RX ADMIN — Medication 200 MILLIGRAM(S): at 13:15

## 2017-07-20 NOTE — PROGRESS NOTE ADULT - SUBJECTIVE AND OBJECTIVE BOX
CC: Follow up for HTN urgency.    SUBJECTIVE / OVERNIGHT EVENTS:  BP improved.  No new complaints.  No F/C, N/V, CP, SOB, Cough, lightheadedness, dizziness, abdominal pain, diarrhea, dysuria.    MEDICATIONS  (STANDING):  enoxaparin Injectable 40 milliGRAM(s) SubCutaneous every 24 hours  cloNIDine Patch 0.3 mG/24Hr(s) 1 patch Topical <User Schedule>  amitriptyline 25 milliGRAM(s) Oral at bedtime  insulin glargine Injectable (LANTUS) 24 Unit(s) SubCutaneous every morning  insulin lispro (HumaLOG) corrective regimen sliding scale   SubCutaneous three times a day before meals  insulin lispro (HumaLOG) corrective regimen sliding scale   SubCutaneous at bedtime  dextrose 5%. 1000 milliLiter(s) (50 mL/Hr) IV Continuous <Continuous>  dextrose 50% Injectable 12.5 Gram(s) IV Push once  dextrose 50% Injectable 25 Gram(s) IV Push once  dextrose 50% Injectable 25 Gram(s) IV Push once  allopurinol 200 milliGRAM(s) Oral daily  atorvastatin 40 milliGRAM(s) Oral at bedtime  pantoprazole    Tablet 40 milliGRAM(s) Oral before breakfast  levothyroxine 50 MICROGram(s) Oral daily  cholecalciferol 1000 Unit(s) Oral daily  spironolactone 25 milliGRAM(s) Oral daily  nystatin Powder 1 Application(s) Topical two times a day  lisinopril 20 milliGRAM(s) Oral daily  docusate sodium 100 milliGRAM(s) Oral daily  senna 2 Tablet(s) Oral at bedtime  labetalol 200 milliGRAM(s) Oral three times a day    MEDICATIONS  (PRN):  dextrose Gel 1 Dose(s) Oral once PRN Blood Glucose LESS THAN 70 milliGRAM(s)/deciliter  glucagon  Injectable 1 milliGRAM(s) IntraMuscular once PRN Glucose LESS THAN 70 milligrams/deciliter  ALBUTerol    90 MICROgram(s) HFA Inhaler 1 Puff(s) Inhalation every 4 hours PRN Shortness of Breath and/or Wheezing  acetaminophen   Tablet. 650 milliGRAM(s) Oral every 6 hours PRN Mild - Moderate Pain (1 - 6)      Vital Signs Last 24 Hrs  T(C): 36.8 (17 @ 13:14), Max: 37 (17 @ 18:44)  HR: 79 (17 @ 13:14) (77 - 88)  BP: 150/78 (17 @ 13:14) (150/69 - 223/76)  RR: 18 (17 @ 13:14) (17 - 18)  SpO2: 97% (17 @ 13:14) (94% - 97%)  CAPILLARY BLOOD GLUCOSE  144 (2017 12:17)  146 (2017 07:45)  161 (2017 21:06)  122 (2017 17:14)        I&O's Summary      PHYSICAL EXAM:  GENERAL: NAD, well-developed  HEAD:  Atraumatic, Normocephalic  EYES: EOMI, PERRLA, conjunctiva and sclera clear  NECK: Supple, No JVD  CHEST/LUNG: Clear to auscultation bilaterally; No wheeze  HEART: Regular rate and rhythm; No murmurs, rubs, or gallops  ABDOMEN: Soft, Nontender, Nondistended; Bowel sounds present  EXTREMITIES:  2+ Peripheral Pulses, No clubbing, cyanosis, or edema  PSYCH: Calm  NEUROLOGY: A/Ox3, non-focal  SKIN: No rashes or lesions    LABS:                        11.7   7.52  )-----------( 248      ( 2017 05:35 )             36.0     07-20    143  |  106  |  12  ----------------------------<  138<H>  3.7   |  24  |  0.88    Ca    8.7      2017 05:35  Phos  3.0     07-20  Mg     1.4     -20    TPro  6.1  /  Alb  3.2<L>  /  TBili  0.6  /  DBili  x   /  AST  21  /  ALT  18  /  AlkPhos  74  07-20      CARDIAC MARKERS ( 2017 05:51 )  x     / < 0.06 ng/mL / 55 u/L / 1.20 ng/mL / x      CARDIAC MARKERS ( 2017 20:40 )  x     / < 0.06 ng/mL / 59 u/L / 1.12 ng/mL / x          Urinalysis Basic - ( 2017 23:07 )    Color: x / Appearance: CLEAR / S.003 / pH: 7.0  Gluc: NEGATIVE / Ketone: NEGATIVE  / Bili: NEGATIVE / Urobili: NORMAL E.U.   Blood: NEGATIVE / Protein: NEGATIVE / Nitrite: NEGATIVE   Leuk Esterase: LARGE / RBC: 0-2 / WBC 5-10   Sq Epi: x / Non Sq Epi: x / Bacteria: FEW        RADIOLOGY & ADDITIONAL TESTS:  < from: [facility] Duplex Abdomen/Pelvis Limited. (17 @ 08:24) >  Patient name: MARCE WALTERS  Date of test: 2017  MR#: [id number]  [facility] #: [id number]    Location: [facility] Physician(s): , Keaton Liu MD  Interpreted by: Shane Finn MD, [facility], [facility], [facility]  Tech: Wagner Acevedo, [facility]  Type of Test: Abdominal Aorta  ------------------------------------------------------------------------  Procedure: Real-time grayscale and color Duplex  ultrasonography was used to interrogate the abdominal  arterial system including the mesentric and renal  arteries.  Indications: Renovascular hypertension (I15.0)  ------------------------------------------------------------------------  MESENTERIC VESSELS:  ------------------------------------------------------------------------  PSV:     Aorta: N/A cm/sec     Celiac Artery: N/A cm/sec     Superior messenteric artery: N/A cm/sec  ------------------------------------------------------------------------  EDV:      Aorta: N/A cm/sec      Celiac Artery: N/A cm/sec      Superior messenteric artery: N/A cm/sec  ------------------------------------------------------------------------  RENAL ARTERY:  ------------------------------------------------------------------------  RIGHT:  ------------------------------------------------------------------------   PSV:     Proximal Vessel: N/A cm/sec     Mid. Vessel: N/A cm/sec     Dist. Vessel: 86.0 cm/sec  ------------------------------------------------------------------------   EDV:      Proximal Vessel: N/A cm/sec      Mid. Vessel: N/A cm/sec      Dist. Vessel: 17.0 cm/sec  ------------------------------------------------------------------------   RAR: N/A  ------------------------------------------------------------------------  LEFT:  ------------------------------------------------------------------------   PSV:   Proximal Vessel: N/A cm/sec     Mid. Vessel: N/A cm/sec     Dist. Vessel: 72.0 cm/sec  ------------------------------------------------------------------------   EDV:      Proximal Vessel: N/A cm/sec      Mid. Vessel: N/A cm/sec      Dist. Vessel: 19.0 cm/sec  ------------------------------------------------------------------------   RAR: N/A  ------------------------------------------------------------------------  KIDNEY:  ------------------------------------------------------------------------  RIGHT:  ------------------------------------------------------------------------    PSV: 22.0 cm/sec    EDV: 5.0 cm/sec    RI: 0.7    Length: 9.8 cm  ------------------------------------------------------------------------  LEFT:  ------------------------------------------------------------------------    PSV: 27.0 cm/sec    EDV: 5.0 cm/sec    RI: 0.8    Length: 9.9 cm  ------------------------------------------------------------------------  Right Findings: --The right kidney measures 9.8 x 4.5 x  4.3 cm.  --Proximal right renal artery: Not well visualized; mid  right renal artery: Not well visualized; distal right  renal artery: PSV 86 cm/sec, EDV 17 cm/sec.  --Simple renal cyst noted in the right kidney, measuring  1.9 x 1.6 cm.  --The right renal vein appears patent, with phasic Doppler  waveforms and without obvious evidence of thrombosis.  --Proximal celiac and superior mesenteric arteries not  well visualized.  Left Findings: --The left kidney measures 9.9 x 5.5 x 3.7  cm.  --Proximal left renal artery: Not well visualized; mid  Left renal artery: Not well visualized; distal Left renal  artery: PSV 72 cm/sec, EDV 19 cm/sec.  --The left renal vein appears patent, with phasic Doppler  waveforms and without obvious evidence of thrombosis.  ------------------------------------------------------------------------  Summary/Impressions:  Study limited by excessive bowel gas and body habitus.  Unable to accurately assess for proximal right and left  renal artery stenosis (not well visualized). Consider  alternative imaging modality if clinically warranted.  ------------------------------------------------------------------------  Confirmed on  2017 - 9:48 AM by Shane Finn MD, [facility],  [facility], [facility]  By signing this report, the attending physician certifies  that he or she has personally supervised and interpreted  the vascular study and has reviewed and or edited and  agrees with the written comments contained within the  report.    < end of copied text >    Imaging Personally Reviewed: yes    Consultant(s) Notes Reviewed:  nephrology    Care Discussed with Consultants/Other Providers:

## 2017-07-20 NOTE — DISCHARGE NOTE ADULT - PATIENT PORTAL LINK FT
“You can access the [facility] Patient Portal, offered by [facility], by registering with the following website: [email or web address]”

## 2017-07-20 NOTE — DISCHARGE NOTE ADULT - PLAN OF CARE
Optimal BP control Continue taking medications as prescribed. Monitor blood pressure at home prior to taking medications. Follow up with PCP and Nephro Dr Cancino as outpatient for further management and treatment. Carvedilol and started on Labetalol 200mg TID today. Patient to continued on Clonidine patch. Restart home dose of ACE-I with holding parameters to only be given if BP is greater than 150. Continue to monitor BP closely. The patient was educated on salt restriction. Continue Allopurinol as prescribed, dose decreased to 200 mg oral daily. Follow up with PCP for further management and treatment. Continue taking Synthroid as prescribed. Follow up with PCP for further management and treatment. Continue taking Metformin and Lantus as prescribed. Continue monitoring blood sugar levels as prescribed. Follow up with PCP for further management and treatment. Continue taking Omeprazole as prescribed. Follow up with PCP for further management and treatment. Continue taking medications as prescribed. Monitor blood pressure at home prior to taking medications. Follow up with PCP and Nephro Dr Cancino as outpatient for further management and treatment. Carvedilol discontinued, started on Labetalol 200mg TID today. Patient to continued on Clonidine patch. Restart home dose of ACE-I with holding parameters to only be given if BP is greater than 150. Continue to monitor BP closely. The patient was educated on salt restriction.

## 2017-07-20 NOTE — DISCHARGE NOTE ADULT - HOSPITAL COURSE
89 year old woman with HTN, HLD, GERD, gout, hypothyroidism, recent right hip hemiarthroplasty, type 2 diabetes admitted for hypertensive urgency.     Hospital Course    Hypertensive urgency - Nephro Dr Cancino consulted changed Coreg to labetalol 200 mg po TID. Renal artery dopplers, renin and aldosteron. CT Head- negative for infarct, or hemorrhage. Continue with Clonidine patch 0.3 mg which was changed Friday, ACE inhibitor, hold antihypertensives for SBP < 150 to avoid overcorrection of BP. Bossman neg x 2 neg.    Hypothyroidism- c/w Synthroid. Gout- c/w Allopurinol. Gastroesophageal reflux disease, esophagitis presence not specified- c/w Protonix (therapeutic interchange for omeprazole). Type 2 diabetes mellitus- c/w Lantus 24 u in AM and 26 u in pm per pt. Metformin held in the hospital will resume at home. Insulin sliding scale, check FS. Patient stable to be discharged home with outpatient follow up. 89 year old woman with HTN, HLD, GERD, gout, hypothyroidism, recent right hip hemiarthroplasty, type 2 diabetes admitted for hypertensive urgency.     Hospital Course    Hypertensive urgency - Nephro Dr Cancino consulted changed Coreg to labetalol 200 mg po TID. Renal artery dopplers- negative. CT Head- negative for infarct, or hemorrhage. Continue with Clonidine patch 0.3 mg which was changed Friday, ACE inhibitor, hold antihypertensives for SBP < 150 to avoid overcorrection of BP. Bossman neg x 2 neg.    Hypothyroidism- c/w Synthroid. Gout- c/w Allopurinol. Gastroesophageal reflux disease, esophagitis presence not specified- c/w Protonix (therapeutic interchange for omeprazole). Type 2 diabetes mellitus- c/w Lantus 24 u in AM and 26 u in pm per pt. Metformin held in the hospital will resume at home. Insulin sliding scale, check FS. Patient stable to be discharged home with outpatient follow up.

## 2017-07-20 NOTE — PROGRESS NOTE ADULT - SUBJECTIVE AND OBJECTIVE BOX
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 Patient currently states she has a slight headache improved from yesterday and feels well. Patient admits to pain in her right hip similar to yesterday. Patient denies SOB, CP, Blurry Vison, Nausea, Vomiting or LE edema.      PAST HISTORY  --------------------------------------------------------------------------------  No significant changes to PMH, PSH, FHx, SHx, unless otherwise noted    ALLERGIES & MEDICATIONS  --------------------------------------------------------------------------------  Allergies    No Known Allergies    Intolerances      Standing Inpatient Medications  enoxaparin Injectable 40 milliGRAM(s) SubCutaneous every 24 hours  cloNIDine Patch 0.3 mG/24Hr(s) 1 patch Topical <User Schedule>  amitriptyline 25 milliGRAM(s) Oral at bedtime  insulin glargine Injectable (LANTUS) 24 Unit(s) SubCutaneous every morning  insulin lispro (HumaLOG) corrective regimen sliding scale   SubCutaneous three times a day before meals  insulin lispro (HumaLOG) corrective regimen sliding scale   SubCutaneous at bedtime  dextrose 5%. 1000 milliLiter(s) IV Continuous <Continuous>  dextrose 50% Injectable 12.5 Gram(s) IV Push once  dextrose 50% Injectable 25 Gram(s) IV Push once  dextrose 50% Injectable 25 Gram(s) IV Push once  allopurinol 200 milliGRAM(s) Oral daily  atorvastatin 40 milliGRAM(s) Oral at bedtime  pantoprazole    Tablet 40 milliGRAM(s) Oral before breakfast  levothyroxine 50 MICROGram(s) Oral daily  cholecalciferol 1000 Unit(s) Oral daily  spironolactone 25 milliGRAM(s) Oral daily  nystatin Powder 1 Application(s) Topical two times a day  lisinopril 20 milliGRAM(s) Oral daily  docusate sodium 100 milliGRAM(s) Oral daily  senna 2 Tablet(s) Oral at bedtime  labetalol 200 milliGRAM(s) Oral three times a day    PRN Inpatient Medications  dextrose Gel 1 Dose(s) Oral once PRN  glucagon  Injectable 1 milliGRAM(s) IntraMuscular once PRN  ALBUTerol    90 MICROgram(s) HFA Inhaler 1 Puff(s) Inhalation every 4 hours PRN  acetaminophen   Tablet. 650 milliGRAM(s) Oral every 6 hours PRN      REVIEW OF SYSTEMS  --------------------------------------------------------------------------------  Gen: No weakness  Skin: No rashes  Head/Eyes/Ears/Mouth: +headache  Respiratory: No dyspnea  CV: No chest pain  GI: No abdominal pain  : No increased frequency  MSK: + joint pain in legs  Neuro: No dizziness/lightheadedness  Heme: No easy bruising or bleeding  Psych: No significant nervousness, anxiety    All other systems were reviewed and are negative, except as noted.    VITALS/PHYSICAL EXAM  --------------------------------------------------------------------------------  T(C): 36.8 (07-20-17 @ 09:50), Max: 37.1 (07-19-17 @ 14:18)  HR: 78 (07-20-17 @ 09:50) (72 - 88)  BP: 150/69 (07-20-17 @ 09:50) (150/69 - 223/76)  RR: 18 (07-20-17 @ 09:50) (17 - 18)  SpO2: 96% (07-20-17 @ 09:50) (94% - 99%)  Wt(kg): --  Height (cm): 160.02 (07-19-17 @ 17:14)  Weight (kg): 83.9 (07-19-17 @ 17:14)  BMI (kg/m2): 32.8 (07-19-17 @ 17:14)  BSA (m2): 1.87 (07-19-17 @ 17:14)    Physical Exam:  	Gen: NAD, well-appearing  	Pulm: CTA B/L  	CV: RRR, S1S2; no rub  	Abd: +BS, soft, nontender/nondistended  	: No suprapubic tenderness  	MSK: Warm, no edema              Neuro: exam was grossly intact              Vasc: No cyanosis noted              Psych: Normal affect      LABS/STUDIES  --------------------------------------------------------------------------------              11.7   7.52  >-----------<  248      [07-20-17 @ 05:35]              36.0     143  |  106  |  12  ----------------------------<  138      [07-20-17 @ 05:35]  3.7   |  24  |  0.88        Ca     8.7     [07-20-17 @ 05:35]      Mg     1.4     [07-20-17 @ 05:35]      Phos  3.0     [07-20-17 @ 05:35]    TPro  6.1  /  Alb  3.2  /  TBili  0.6  /  DBili  x   /  AST  21  /  ALT  18  /  AlkPhos  74  [07-20-17 @ 05:35]        Troponin < 0.06      [07-19-17 @ 05:51]  CK 55      [07-19-17 @ 05:51]    Creatinine Trend:  SCr 0.88 [07-20 @ 05:35]  SCr 0.79 [07-19 @ 05:51]  SCr 0.89 [07-18 @ 20:40]    Urinalysis - [07-18-17 @ 23:07]      Color  / Appearance CLEAR / SG 1.003 / pH 7.0      Gluc NEGATIVE / Ketone NEGATIVE  / Bili NEGATIVE / Urobili NORMAL       Blood NEGATIVE / Protein NEGATIVE / Leuk Est LARGE / Nitrite NEGATIVE      RBC 0-2 / WBC 5-10 / Hyaline  / Gran  / Sq Epi  / Non Sq Epi  / Bacteria FEW      HbA1c 6.7      [07-20-17 @ 05:35]  TSH 6.75      [01-16-17 @ 09:39]  Lipid: chol 123, , HDL 40, LDL 61      [01-16-17 @ 09:39]

## 2017-07-20 NOTE — DISCHARGE NOTE ADULT - VISION (WITH CORRECTIVE LENSES IF THE PATIENT USUALLY WEARS THEM):
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

## 2017-07-20 NOTE — DISCHARGE NOTE ADULT - ADDITIONAL INSTRUCTIONS
Follow up with PCP and Dr Barak Fall as outpatient for further management and treatment within a week.

## 2017-07-20 NOTE — PROGRESS NOTE ADULT - PROBLEM SELECTOR PLAN 1
Patient BP improved overnight. Patient was discontinued from Carvedilol and started on Labetalol 200mg TID today. Patient to continued on Clonidine patch. Would restart her on her home dose of ACE-I if needed, with holding parameters to only be given if BP is greater than 150. Continue to monitor BP closely Patient BP improved overnight. Patient was discontinued from Carvedilol and started on Labetalol 200mg TID today. Patient to continued on Clonidine patch. Would restart her on her home dose of ACE-I with holding parameters to only be given if BP is greater than 150. Continue to monitor BP closely.  I suspect that dietary salt intake is playing a role in uncontrolled hypertension.  The patient was educated on salt restriction.  The patient is aware that she will need follow up with Dr. Cancino on the outside.  Doppler negative for QIANA but study was limited.

## 2017-07-20 NOTE — DISCHARGE NOTE ADULT - MEDICATION SUMMARY - MEDICATIONS TO STOP TAKING
I will STOP taking the medications listed below when I get home from the hospital:    carvedilol 12.5 mg oral tablet  -- 1 tab(s) by mouth every 12 hours    spironolactone 25 mg oral tablet  -- 1 tab(s) by mouth once a day  -- It is very important that you take or use this exactly as directed.  Do not skip doses or discontinue unless directed by your doctor.  May cause drowsiness or dizziness.    carvedilol 12.5 mg oral tablet  -- 0.5 tab(s) by mouth every 12 hours    spironolactone-hydrochlorothiazide 25mg-25mg oral tablet  -- 1 tab(s) by mouth once a day

## 2017-07-20 NOTE — PROGRESS NOTE ADULT - PROBLEM SELECTOR PLAN 1
Currently on clonidine patch, spironolactone, labetolol, lisinopril.  Appreciate renal input. patient on lisinopril because of therapeutic interchange from her home meds.

## 2017-07-20 NOTE — DISCHARGE NOTE ADULT - CARE PROVIDER_API CALL
Gio Carias), Internal Medicine  [address]  [place] [address]  Phone: [phone]  Fax: [phone]    Leon Cancino), Internal Medicine  [address] [place] [address]  Phone: [phone]  Fax: [phone]

## 2017-07-20 NOTE — DISCHARGE NOTE ADULT - MEDICATION SUMMARY - MEDICATIONS TO TAKE
I will START or STAY ON the medications listed below when I get home from the hospital:    acetaminophen 325 mg oral tablet  -- 2 tab(s) by mouth every 6 hours, As needed, Mild - Moderate Pain (1 - 6)  -- Indication: For Pain    benazepril 20 mg oral tablet  -- 1 tab(s) by mouth once a day  -- Indication: For Hypertensive urgency    cloNIDine 0.3 mg/24 hr transdermal film, extended release  -- 1 patch by mouth once a week  -- Indication: For Hypertensive urgency    amitriptyline 25 mg oral tablet  -- 1 tab(s) by mouth once a day (at bedtime)  -- Indication: For Herpetic pain    metFORMIN 500 mg oral tablet, extended release  -- 1 tab(s) by mouth 2 times a day  -- Indication: For Type 2 diabetes mellitus    Lantus Solostar Pen 100 units/mL subcutaneous solution  -- 24 unit(s) in am and 26 u in pm subcutaneous 2 times a day   -- Indication: For Type 2 diabetes mellitus    allopurinol 100 mg oral tablet  -- 2 tab(s) by mouth once a day  -- Indication: For Gout    atorvastatin 40 mg oral tablet  -- 1 tab(s) by mouth once a day (at bedtime)  -- Indication: For HLD    labetalol 200 mg oral tablet  -- 1 tab(s) by mouth 3 times a day  -- Indication: For Hypertensive urgency    albuterol 90 mcg/inh inhalation aerosol  -- 1 puff(s) inhaled every 4 hours, As needed, Shortness of Breath and/or Wheezing  -- Indication: For Need for prophylactic measure    nystatin 100,000 units/g topical powder  -- 1 application on skin 2 times a day  -- Indication: For Need for prophylactic measure    senna oral tablet  -- 2 tab(s) by mouth once a day (at bedtime)  -- Indication: For constipation    omeprazole 20 mg oral delayed release capsule  -- 1 cap(s) by mouth once a day  -- Indication: For Gastroesophageal reflux disease, esophagitis presence not specified    levothyroxine 50 mcg (0.05 mg) oral tablet  -- 1 tab(s) by mouth once a day  -- Indication: For Hypothyroidism, unspecified type    Vitamin D3 1000 intl units oral capsule  -- 1 cap(s) by mouth once a day  -- Indication: For Need for prophylactic measure

## 2017-07-20 NOTE — PROGRESS NOTE ADULT - PROBLEM SELECTOR PLAN 2
Hold PO DM meds while in-patient.  Monitor FS. NISS. Discharge on home meds.
Hold PO DM meds while in-patient.  Monitor FS. NISS.

## 2017-07-20 NOTE — DISCHARGE NOTE ADULT - SECONDARY DIAGNOSIS.
Gout Hypothyroidism, unspecified type Type 2 diabetes mellitus with complication, without long-term current use of insulin Gastroesophageal reflux disease, esophagitis presence not specified

## 2017-07-20 NOTE — PROGRESS NOTE ADULT - PROBLEM SELECTOR PROBLEM 2
Type 2 diabetes mellitus with complication, without long-term current use of insulin
Type 2 diabetes mellitus with complication, without long-term current use of insulin

## 2017-07-20 NOTE — PROGRESS NOTE ADULT - ATTENDING COMMENTS
Patient medically optimized for discharge to home.  Discharge planning 40 minutes - discussed with patient and consultants. Follow up with renal as outpatient.

## 2017-07-20 NOTE — DISCHARGE NOTE ADULT - CARE PLAN
Principal Discharge DX:	Hypertensive urgency  Goal:	Optimal BP control  Instructions for follow-up, activity and diet:	Continue taking medications as prescribed. Monitor blood pressure at home prior to taking medications. Follow up with PCP and Nephro Dr Cancino as outpatient for further management and treatment. Carvedilol and started on Labetalol 200mg TID today. Patient to continued on Clonidine patch. Restart home dose of ACE-I with holding parameters to only be given if BP is greater than 150. Continue to monitor BP closely. The patient was educated on salt restriction.  Secondary Diagnosis:	Gout  Instructions for follow-up, activity and diet:	Continue Allopurinol as prescribed, dose decreased to 200 mg oral daily. Follow up with PCP for further management and treatment.  Secondary Diagnosis:	Hypothyroidism, unspecified type  Instructions for follow-up, activity and diet:	Continue taking Synthroid as prescribed. Follow up with PCP for further management and treatment.  Secondary Diagnosis:	Type 2 diabetes mellitus with complication, without long-term current use of insulin  Instructions for follow-up, activity and diet:	Continue taking Metformin and Lantus as prescribed. Continue monitoring blood sugar levels as prescribed. Follow up with PCP for further management and treatment.  Secondary Diagnosis:	Gastroesophageal reflux disease, esophagitis presence not specified  Instructions for follow-up, activity and diet:	Continue taking Omeprazole as prescribed. Follow up with PCP for further management and treatment. Principal Discharge DX:	Hypertensive urgency  Goal:	Optimal BP control  Instructions for follow-up, activity and diet:	Continue taking medications as prescribed. Monitor blood pressure at home prior to taking medications. Follow up with PCP and Nephro Dr Cancino as outpatient for further management and treatment. Carvedilol discontinued, started on Labetalol 200mg TID today. Patient to continued on Clonidine patch. Restart home dose of ACE-I with holding parameters to only be given if BP is greater than 150. Continue to monitor BP closely. The patient was educated on salt restriction.  Secondary Diagnosis:	Gout  Instructions for follow-up, activity and diet:	Continue Allopurinol as prescribed, dose decreased to 200 mg oral daily. Follow up with PCP for further management and treatment.  Secondary Diagnosis:	Hypothyroidism, unspecified type  Instructions for follow-up, activity and diet:	Continue taking Synthroid as prescribed. Follow up with PCP for further management and treatment.  Secondary Diagnosis:	Type 2 diabetes mellitus with complication, without long-term current use of insulin  Instructions for follow-up, activity and diet:	Continue taking Metformin and Lantus as prescribed. Continue monitoring blood sugar levels as prescribed. Follow up with PCP for further management and treatment.  Secondary Diagnosis:	Gastroesophageal reflux disease, esophagitis presence not specified  Instructions for follow-up, activity and diet:	Continue taking Omeprazole as prescribed. Follow up with PCP for further management and treatment.

## 2017-07-21 ENCOUNTER — INPATIENT (INPATIENT)
Facility: HOSPITAL | Age: 82
LOS: 2 days | Discharge: HOME | End: 2017-07-24
Attending: HOSPITALIST | Admitting: HOSPITALIST
Payer: MEDICARE

## 2017-07-21 VITALS
HEART RATE: 67 BPM | RESPIRATION RATE: 18 BRPM | OXYGEN SATURATION: 98 % | SYSTOLIC BLOOD PRESSURE: 142 MMHG | TEMPERATURE: 98 F | DIASTOLIC BLOOD PRESSURE: 67 MMHG

## 2017-07-21 DIAGNOSIS — N17.9 ACUTE KIDNEY FAILURE, UNSPECIFIED: ICD-10-CM

## 2017-07-21 DIAGNOSIS — K42.9 UMBILICAL HERNIA WITHOUT OBSTRUCTION OR GANGRENE: Chronic | ICD-10-CM

## 2017-07-21 LAB
ALBUMIN SERPL ELPH-MCNC: 3.7 G/DL — SIGNIFICANT CHANGE UP (ref 3.3–5)
ALDOST SERPL-MCNC: 7.9 NG/DL — SIGNIFICANT CHANGE UP
ALP SERPL-CCNC: 82 U/L — SIGNIFICANT CHANGE UP (ref 40–120)
ALT FLD-CCNC: 18 U/L — SIGNIFICANT CHANGE UP (ref 4–33)
APPEARANCE UR: SIGNIFICANT CHANGE UP
AST SERPL-CCNC: 24 U/L — SIGNIFICANT CHANGE UP (ref 4–32)
BASOPHILS # BLD AUTO: 0.05 K/UL — SIGNIFICANT CHANGE UP (ref 0–0.2)
BASOPHILS NFR BLD AUTO: 0.6 % — SIGNIFICANT CHANGE UP (ref 0–2)
BILIRUB SERPL-MCNC: 0.5 MG/DL — SIGNIFICANT CHANGE UP (ref 0.2–1.2)
BILIRUB UR-MCNC: SIGNIFICANT CHANGE UP
BLOOD UR QL VISUAL: NEGATIVE — SIGNIFICANT CHANGE UP
BUN SERPL-MCNC: 27 MG/DL — HIGH (ref 7–23)
CALCIUM SERPL-MCNC: 8.9 MG/DL — SIGNIFICANT CHANGE UP (ref 8.4–10.5)
CHLORIDE SERPL-SCNC: 100 MMOL/L — SIGNIFICANT CHANGE UP (ref 98–107)
CO2 SERPL-SCNC: 25 MMOL/L — SIGNIFICANT CHANGE UP (ref 22–31)
COLOR SPEC: YELLOW — SIGNIFICANT CHANGE UP
CREAT SERPL-MCNC: 1.57 MG/DL — HIGH (ref 0.5–1.3)
EOSINOPHIL # BLD AUTO: 0.49 K/UL — SIGNIFICANT CHANGE UP (ref 0–0.5)
EOSINOPHIL NFR BLD AUTO: 5.7 % — SIGNIFICANT CHANGE UP (ref 0–6)
GLUCOSE SERPL-MCNC: 205 MG/DL — HIGH (ref 70–99)
GLUCOSE UR-MCNC: NEGATIVE — SIGNIFICANT CHANGE UP
HCT VFR BLD CALC: 35.5 % — SIGNIFICANT CHANGE UP (ref 34.5–45)
HGB BLD-MCNC: 11.2 G/DL — LOW (ref 11.5–15.5)
HYALINE CASTS # UR AUTO: SIGNIFICANT CHANGE UP (ref 0–?)
IMM GRANULOCYTES # BLD AUTO: 0.03 # — SIGNIFICANT CHANGE UP
IMM GRANULOCYTES NFR BLD AUTO: 0.3 % — SIGNIFICANT CHANGE UP (ref 0–1.5)
KETONES UR-MCNC: SIGNIFICANT CHANGE UP
LEUKOCYTE ESTERASE UR-ACNC: NEGATIVE — SIGNIFICANT CHANGE UP
LYMPHOCYTES # BLD AUTO: 1.81 K/UL — SIGNIFICANT CHANGE UP (ref 1–3.3)
LYMPHOCYTES # BLD AUTO: 20.9 % — SIGNIFICANT CHANGE UP (ref 13–44)
MCHC RBC-ENTMCNC: 28.1 PG — SIGNIFICANT CHANGE UP (ref 27–34)
MCHC RBC-ENTMCNC: 31.5 % — LOW (ref 32–36)
MCV RBC AUTO: 89 FL — SIGNIFICANT CHANGE UP (ref 80–100)
MONOCYTES # BLD AUTO: 1 K/UL — HIGH (ref 0–0.9)
MONOCYTES NFR BLD AUTO: 11.5 % — SIGNIFICANT CHANGE UP (ref 2–14)
MUCOUS THREADS # UR AUTO: SIGNIFICANT CHANGE UP
NEUTROPHILS # BLD AUTO: 5.28 K/UL — SIGNIFICANT CHANGE UP (ref 1.8–7.4)
NEUTROPHILS NFR BLD AUTO: 61 % — SIGNIFICANT CHANGE UP (ref 43–77)
NITRITE UR-MCNC: NEGATIVE — SIGNIFICANT CHANGE UP
NON-SQ EPI CELLS # UR AUTO: <1 — SIGNIFICANT CHANGE UP
NRBC # FLD: 0 — SIGNIFICANT CHANGE UP
PH UR: 5.5 — SIGNIFICANT CHANGE UP (ref 4.6–8)
PLATELET # BLD AUTO: 241 K/UL — SIGNIFICANT CHANGE UP (ref 150–400)
PMV BLD: 10.7 FL — SIGNIFICANT CHANGE UP (ref 7–13)
POTASSIUM SERPL-MCNC: 4.5 MMOL/L — SIGNIFICANT CHANGE UP (ref 3.5–5.3)
POTASSIUM SERPL-SCNC: 4.5 MMOL/L — SIGNIFICANT CHANGE UP (ref 3.5–5.3)
PROT SERPL-MCNC: 6.5 G/DL — SIGNIFICANT CHANGE UP (ref 6–8.3)
PROT UR-MCNC: 20 — SIGNIFICANT CHANGE UP
RBC # BLD: 3.99 M/UL — SIGNIFICANT CHANGE UP (ref 3.8–5.2)
RBC # FLD: 16.1 % — HIGH (ref 10.3–14.5)
RBC CASTS # UR COMP ASSIST: SIGNIFICANT CHANGE UP (ref 0–?)
RENIN DIRECT, PLASMA: < 2.1 PG/ML — SIGNIFICANT CHANGE UP
SODIUM SERPL-SCNC: 138 MMOL/L — SIGNIFICANT CHANGE UP (ref 135–145)
SP GR SPEC: 1.02 — SIGNIFICANT CHANGE UP (ref 1–1.03)
SQUAMOUS # UR AUTO: SIGNIFICANT CHANGE UP
UROBILINOGEN FLD QL: 2 E.U. — SIGNIFICANT CHANGE UP (ref 0.1–0.2)
WBC # BLD: 8.66 K/UL — SIGNIFICANT CHANGE UP (ref 3.8–10.5)
WBC # FLD AUTO: 8.66 K/UL — SIGNIFICANT CHANGE UP (ref 3.8–10.5)
WBC UR QL: SIGNIFICANT CHANGE UP (ref 0–?)

## 2017-07-21 PROCEDURE — 99223 1ST HOSP IP/OBS HIGH 75: CPT | Mod: GC

## 2017-07-21 PROCEDURE — 76770 US EXAM ABDO BACK WALL COMP: CPT | Mod: 26

## 2017-07-21 RX ORDER — SODIUM CHLORIDE 9 MG/ML
1000 INJECTION INTRAMUSCULAR; INTRAVENOUS; SUBCUTANEOUS ONCE
Qty: 0 | Refills: 0 | Status: COMPLETED | OUTPATIENT
Start: 2017-07-21 | End: 2017-07-21

## 2017-07-21 RX ADMIN — SODIUM CHLORIDE 1000 MILLILITER(S): 9 INJECTION INTRAMUSCULAR; INTRAVENOUS; SUBCUTANEOUS at 19:24

## 2017-07-21 RX ADMIN — SODIUM CHLORIDE 1000 MILLILITER(S): 9 INJECTION INTRAMUSCULAR; INTRAVENOUS; SUBCUTANEOUS at 17:46

## 2017-07-21 NOTE — ED ADULT NURSE NOTE - OBJECTIVE STATEMENT
Patient received to room 29 awake, alert, oriented x3, able to make needs known verbally.  Patient complaining of low blood pressure and inability to urinate since last night.  Patient does not complain of pain.  vital signs recorded, hypertensive.  Peripheral IV started, labs obtained, IVF administered as per provider order.  Indwelling urethral catheter placed.  Call bell within reach, safety maintained, will continue to monitor.

## 2017-07-21 NOTE — ED PROVIDER NOTE - OBJECTIVE STATEMENT
89yo female h/o DM, HTN, hypothyroidism, p/w inability to urinate since last night. Pt only has mild urge to urinate. Las urinated last night was only a trickle. NO abdominal pain, nausea, vomiting, fevers, chills. Pt had similar episode in April and was found to be in ARF 2/2 multiple diuretic use.

## 2017-07-21 NOTE — ED PROVIDER NOTE - ATTENDING CONTRIBUTION TO CARE
89F h/o HTN, DM, hypothyroid presents with decreased urine output.  Was just admitted for hypertensive urgency and discharged home yesterday. BP medications were changed and she was started on labetelol TID, with which she has been compliant. However this morning her BP was 90/40 so she skipped her am dose.  States she was able to have a normal BM this morning but has had no UOP since last night.  Reports this happened once before and was diagnosed w FLETCHER. On exam well appearing, nad, OP clear, mmm, lungs clear, rrr, abd soft, 2+ pulses, no edema, no rash, no focal neuro deficits. Bedside u/s with small urine in bladder, approx 100cc UOP from mckeon.  Cr doubled from yesterday.  Given IVF, will admit for FLETCHER and oliguria.

## 2017-07-21 NOTE — ED ADULT TRIAGE NOTE - CHIEF COMPLAINT QUOTE
Pt co urinary retention since this morning. States blood pressures have been irregular, states "sometimes it is very high and sometimes very low". Was discharged yesterday for hypertension. Hx of kidney failure. Appears comfortable in triage.

## 2017-07-21 NOTE — ED PROVIDER NOTE - MEDICAL DECISION MAKING DETAILS
89yo female with inability to urinate, no palpable bladder on exam, bladder collapsed on US, likely renal failure/oliguria, will check labs, mckeon

## 2017-07-21 NOTE — ED ADULT NURSE NOTE - ED STAT RN HANDOFF DETAILS
Patient is in NAD, and has room available.  Report given to nurse on floor via phone.  Patient awaiting transportation.  Will continue to monitor patient closely. LIBRADO Hahn R.N.

## 2017-07-22 DIAGNOSIS — K21.9 GASTRO-ESOPHAGEAL REFLUX DISEASE WITHOUT ESOPHAGITIS: ICD-10-CM

## 2017-07-22 DIAGNOSIS — M10.9 GOUT, UNSPECIFIED: ICD-10-CM

## 2017-07-22 DIAGNOSIS — N17.9 ACUTE KIDNEY FAILURE, UNSPECIFIED: ICD-10-CM

## 2017-07-22 DIAGNOSIS — Z29.9 ENCOUNTER FOR PROPHYLACTIC MEASURES, UNSPECIFIED: ICD-10-CM

## 2017-07-22 DIAGNOSIS — E03.9 HYPOTHYROIDISM, UNSPECIFIED: ICD-10-CM

## 2017-07-22 DIAGNOSIS — I10 ESSENTIAL (PRIMARY) HYPERTENSION: ICD-10-CM

## 2017-07-22 DIAGNOSIS — E11.9 TYPE 2 DIABETES MELLITUS WITHOUT COMPLICATIONS: ICD-10-CM

## 2017-07-22 LAB
BUN SERPL-MCNC: 17 MG/DL — SIGNIFICANT CHANGE UP (ref 7–23)
CALCIUM SERPL-MCNC: 8.5 MG/DL — SIGNIFICANT CHANGE UP (ref 8.4–10.5)
CHLORIDE SERPL-SCNC: 109 MMOL/L — HIGH (ref 98–107)
CHLORIDE UR-SCNC: 30 MMOL/L — SIGNIFICANT CHANGE UP
CO2 SERPL-SCNC: 25 MMOL/L — SIGNIFICANT CHANGE UP (ref 22–31)
CREAT ?TM UR-MCNC: 29.27 MG/DL — SIGNIFICANT CHANGE UP
CREAT SERPL-MCNC: 0.98 MG/DL — SIGNIFICANT CHANGE UP (ref 0.5–1.3)
GLUCOSE SERPL-MCNC: 91 MG/DL — SIGNIFICANT CHANGE UP (ref 70–99)
HCT VFR BLD CALC: 33.4 % — LOW (ref 34.5–45)
HGB BLD-MCNC: 10.8 G/DL — LOW (ref 11.5–15.5)
MAGNESIUM SERPL-MCNC: 1.5 MG/DL — LOW (ref 1.6–2.6)
MCHC RBC-ENTMCNC: 28.5 PG — SIGNIFICANT CHANGE UP (ref 27–34)
MCHC RBC-ENTMCNC: 32.3 % — SIGNIFICANT CHANGE UP (ref 32–36)
MCV RBC AUTO: 88.1 FL — SIGNIFICANT CHANGE UP (ref 80–100)
NRBC # FLD: 0 — SIGNIFICANT CHANGE UP
PHOSPHATE SERPL-MCNC: 3.2 MG/DL — SIGNIFICANT CHANGE UP (ref 2.5–4.5)
PLATELET # BLD AUTO: 205 K/UL — SIGNIFICANT CHANGE UP (ref 150–400)
PMV BLD: 10.7 FL — SIGNIFICANT CHANGE UP (ref 7–13)
POTASSIUM SERPL-MCNC: 4.1 MMOL/L — SIGNIFICANT CHANGE UP (ref 3.5–5.3)
POTASSIUM SERPL-SCNC: 4.1 MMOL/L — SIGNIFICANT CHANGE UP (ref 3.5–5.3)
POTASSIUM UR-SCNC: 7.2 MEQ/L — SIGNIFICANT CHANGE UP
RBC # BLD: 3.79 M/UL — LOW (ref 3.8–5.2)
RBC # FLD: 16.1 % — HIGH (ref 10.3–14.5)
SODIUM SERPL-SCNC: 145 MMOL/L — SIGNIFICANT CHANGE UP (ref 135–145)
SODIUM UR-SCNC: 44 MEQ/L — SIGNIFICANT CHANGE UP
UUN UR-MCNC: 249.1 MG/DL — SIGNIFICANT CHANGE UP
WBC # BLD: 7.36 K/UL — SIGNIFICANT CHANGE UP (ref 3.8–10.5)
WBC # FLD AUTO: 7.36 K/UL — SIGNIFICANT CHANGE UP (ref 3.8–10.5)

## 2017-07-22 PROCEDURE — 99233 SBSQ HOSP IP/OBS HIGH 50: CPT

## 2017-07-22 RX ORDER — ALLOPURINOL 300 MG
200 TABLET ORAL DAILY
Qty: 0 | Refills: 0 | Status: DISCONTINUED | OUTPATIENT
Start: 2017-07-22 | End: 2017-07-24

## 2017-07-22 RX ORDER — AMITRIPTYLINE HCL 25 MG
25 TABLET ORAL AT BEDTIME
Qty: 0 | Refills: 0 | Status: DISCONTINUED | OUTPATIENT
Start: 2017-07-22 | End: 2017-07-24

## 2017-07-22 RX ORDER — DEXTROSE 50 % IN WATER 50 %
12.5 SYRINGE (ML) INTRAVENOUS ONCE
Qty: 0 | Refills: 0 | Status: DISCONTINUED | OUTPATIENT
Start: 2017-07-22 | End: 2017-07-24

## 2017-07-22 RX ORDER — PANTOPRAZOLE SODIUM 20 MG/1
40 TABLET, DELAYED RELEASE ORAL
Qty: 0 | Refills: 0 | Status: DISCONTINUED | OUTPATIENT
Start: 2017-07-22 | End: 2017-07-24

## 2017-07-22 RX ORDER — ALBUTEROL 90 UG/1
1 AEROSOL, METERED ORAL EVERY 4 HOURS
Qty: 0 | Refills: 0 | Status: DISCONTINUED | OUTPATIENT
Start: 2017-07-22 | End: 2017-07-24

## 2017-07-22 RX ORDER — DEXTROSE 50 % IN WATER 50 %
1 SYRINGE (ML) INTRAVENOUS ONCE
Qty: 0 | Refills: 0 | Status: DISCONTINUED | OUTPATIENT
Start: 2017-07-22 | End: 2017-07-24

## 2017-07-22 RX ORDER — CHOLECALCIFEROL (VITAMIN D3) 125 MCG
1000 CAPSULE ORAL DAILY
Qty: 0 | Refills: 0 | Status: DISCONTINUED | OUTPATIENT
Start: 2017-07-22 | End: 2017-07-24

## 2017-07-22 RX ORDER — SENNA PLUS 8.6 MG/1
2 TABLET ORAL AT BEDTIME
Qty: 0 | Refills: 0 | Status: DISCONTINUED | OUTPATIENT
Start: 2017-07-22 | End: 2017-07-24

## 2017-07-22 RX ORDER — SODIUM CHLORIDE 9 MG/ML
1000 INJECTION, SOLUTION INTRAVENOUS
Qty: 0 | Refills: 0 | Status: DISCONTINUED | OUTPATIENT
Start: 2017-07-22 | End: 2017-07-24

## 2017-07-22 RX ORDER — INSULIN GLARGINE 100 [IU]/ML
24 INJECTION, SOLUTION SUBCUTANEOUS EVERY MORNING
Qty: 0 | Refills: 0 | Status: DISCONTINUED | OUTPATIENT
Start: 2017-07-22 | End: 2017-07-24

## 2017-07-22 RX ORDER — INSULIN LISPRO 100/ML
VIAL (ML) SUBCUTANEOUS AT BEDTIME
Qty: 0 | Refills: 0 | Status: DISCONTINUED | OUTPATIENT
Start: 2017-07-22 | End: 2017-07-24

## 2017-07-22 RX ORDER — DEXTROSE 50 % IN WATER 50 %
25 SYRINGE (ML) INTRAVENOUS ONCE
Qty: 0 | Refills: 0 | Status: DISCONTINUED | OUTPATIENT
Start: 2017-07-22 | End: 2017-07-24

## 2017-07-22 RX ORDER — NYSTATIN CREAM 100000 [USP'U]/G
1 CREAM TOPICAL
Qty: 0 | Refills: 0 | Status: DISCONTINUED | OUTPATIENT
Start: 2017-07-22 | End: 2017-07-24

## 2017-07-22 RX ORDER — INSULIN LISPRO 100/ML
VIAL (ML) SUBCUTANEOUS
Qty: 0 | Refills: 0 | Status: DISCONTINUED | OUTPATIENT
Start: 2017-07-22 | End: 2017-07-24

## 2017-07-22 RX ORDER — LEVOTHYROXINE SODIUM 125 MCG
50 TABLET ORAL DAILY
Qty: 0 | Refills: 0 | Status: DISCONTINUED | OUTPATIENT
Start: 2017-07-22 | End: 2017-07-24

## 2017-07-22 RX ORDER — GLUCAGON INJECTION, SOLUTION 0.5 MG/.1ML
1 INJECTION, SOLUTION SUBCUTANEOUS ONCE
Qty: 0 | Refills: 0 | Status: DISCONTINUED | OUTPATIENT
Start: 2017-07-22 | End: 2017-07-24

## 2017-07-22 RX ORDER — HEPARIN SODIUM 5000 [USP'U]/ML
5000 INJECTION INTRAVENOUS; SUBCUTANEOUS EVERY 8 HOURS
Qty: 0 | Refills: 0 | Status: DISCONTINUED | OUTPATIENT
Start: 2017-07-22 | End: 2017-07-24

## 2017-07-22 RX ORDER — LISINOPRIL 2.5 MG/1
20 TABLET ORAL DAILY
Qty: 0 | Refills: 0 | Status: DISCONTINUED | OUTPATIENT
Start: 2017-07-22 | End: 2017-07-24

## 2017-07-22 RX ORDER — MAGNESIUM SULFATE 500 MG/ML
2 VIAL (ML) INJECTION ONCE
Qty: 0 | Refills: 0 | Status: COMPLETED | OUTPATIENT
Start: 2017-07-22 | End: 2017-07-22

## 2017-07-22 RX ORDER — LABETALOL HCL 100 MG
200 TABLET ORAL THREE TIMES A DAY
Qty: 0 | Refills: 0 | Status: DISCONTINUED | OUTPATIENT
Start: 2017-07-22 | End: 2017-07-24

## 2017-07-22 RX ORDER — ATORVASTATIN CALCIUM 80 MG/1
40 TABLET, FILM COATED ORAL AT BEDTIME
Qty: 0 | Refills: 0 | Status: DISCONTINUED | OUTPATIENT
Start: 2017-07-22 | End: 2017-07-24

## 2017-07-22 RX ADMIN — INSULIN GLARGINE 24 UNIT(S): 100 INJECTION, SOLUTION SUBCUTANEOUS at 08:50

## 2017-07-22 RX ADMIN — Medication 25 MILLIGRAM(S): at 21:38

## 2017-07-22 RX ADMIN — Medication 50 GRAM(S): at 12:06

## 2017-07-22 RX ADMIN — NYSTATIN CREAM 1 APPLICATION(S): 100000 CREAM TOPICAL at 14:12

## 2017-07-22 RX ADMIN — HEPARIN SODIUM 5000 UNIT(S): 5000 INJECTION INTRAVENOUS; SUBCUTANEOUS at 11:12

## 2017-07-22 RX ADMIN — HEPARIN SODIUM 5000 UNIT(S): 5000 INJECTION INTRAVENOUS; SUBCUTANEOUS at 05:53

## 2017-07-22 RX ADMIN — Medication 50 MICROGRAM(S): at 05:53

## 2017-07-22 RX ADMIN — Medication 200 MILLIGRAM(S): at 12:43

## 2017-07-22 RX ADMIN — LISINOPRIL 20 MILLIGRAM(S): 2.5 TABLET ORAL at 14:12

## 2017-07-22 RX ADMIN — Medication 1000 UNIT(S): at 11:12

## 2017-07-22 RX ADMIN — HEPARIN SODIUM 5000 UNIT(S): 5000 INJECTION INTRAVENOUS; SUBCUTANEOUS at 21:39

## 2017-07-22 RX ADMIN — ATORVASTATIN CALCIUM 40 MILLIGRAM(S): 80 TABLET, FILM COATED ORAL at 21:38

## 2017-07-22 RX ADMIN — PANTOPRAZOLE SODIUM 40 MILLIGRAM(S): 20 TABLET, DELAYED RELEASE ORAL at 05:53

## 2017-07-22 RX ADMIN — SENNA PLUS 2 TABLET(S): 8.6 TABLET ORAL at 21:37

## 2017-07-22 RX ADMIN — NYSTATIN CREAM 1 APPLICATION(S): 100000 CREAM TOPICAL at 05:53

## 2017-07-22 RX ADMIN — Medication 200 MILLIGRAM(S): at 11:12

## 2017-07-22 RX ADMIN — Medication 200 MILLIGRAM(S): at 21:38

## 2017-07-22 NOTE — PROGRESS NOTE ADULT - PROBLEM SELECTOR PLAN 2
-c/w labetalol and clonidine patch only for now  -Monitor BP and plan to resume benazepril If SBP>150 as FLETCHER improving - Monitor BP closely  - Resume ACEI and monitor renal function  - c/w labetalol and clonidine patch

## 2017-07-22 NOTE — H&P ADULT - ASSESSMENT
89/F with HTN, HLD, GERD, gout, hypothyroidism, recent right hip hemiarthroplasty, type 2 diabetes, recent admission for hypertensive urgency, admitted with FLETCHER.

## 2017-07-22 NOTE — H&P ADULT - PROBLEM SELECTOR PLAN 3
-patient discharged on lantus 24 units in AM and 26 units in PM  -hbA1c 6.7. -patient discharged on lantus 24 units in AM and 26 units in PM  -hbA1c 6.7.  -last admission patient had no order for PM lantus and FS are not above 200 as recorded in sunrise  -I would not discharge this patient on BID lantus  -continue with AM 24 units lantus for now and monitor FS  -Patient is 89 years old with target A1c ~7, would not give BID lantus for concern for hypoglycemia. -patient discharged on lantus 24 units in AM and 26 units in PM  -hbA1c 6.7.  -last admission patient had no order for PM lantus and FS are not above 200 as recorded in sunrise  -I would not discharge this patient on BID lantus  -continue with AM 24 units lantus for now and monitor FS  -Patient is 89 years old with target A1c > 7, would not give BID lantus for concern for hypoglycemia.

## 2017-07-22 NOTE — H&P ADULT - NSHPLABSRESULTS_GEN_ALL_CORE
11.2   8.66  )-----------( 241      ( 2017 17:18 )             35.5     07-    138  |  100  |  27<H>  ----------------------------<  205<H>  4.5   |  25  |  1.57<H>    Ca    8.9      2017 17:18  Phos  3.0     07-  Mg     1.4     -    TPro  6.5  /  Alb  3.7  /  TBili  0.5  /  DBili  x   /  AST  24  /  ALT  18  /  AlkPhos  82  07-21        CAPILLARY BLOOD GLUCOSE  127 (2017 22:26)      Urinalysis Basic - ( 2017 17:43 )    Color: YELLOW / Appearance: HAZY / S.023 / pH: 5.5  Gluc: NEGATIVE / Ketone: TRACE  / Bili: SMALL / Urobili: 2 E.U.   Blood: NEGATIVE / Protein: 20 / Nitrite: NEGATIVE   Leuk Esterase: NEGATIVE / RBC: 0-2 / WBC 2-5   Sq Epi: OCC / Non Sq Epi: x / Bacteria: x  Hyaline Casts: 25-50 (17 @ 17:43)

## 2017-07-22 NOTE — PROGRESS NOTE ADULT - SUBJECTIVE AND OBJECTIVE BOX
Patient is a 89y old  Female who presents with a chief complaint of Decrease urine output and elevated BP (2017 00:24)      SUBJECTIVE / OVERNIGHT EVENTS: No overnight events. Summers removed this am and pt seen voiding on commode. Denies any headaches, new visual changes. Denies abdominal pain.     MEDICATIONS  (STANDING):  cloNIDine Patch 0.3 mG/24Hr(s) 1 patch Topical <User Schedule>  amitriptyline 25 milliGRAM(s) Oral at bedtime  insulin glargine Injectable (LANTUS) 24 Unit(s) SubCutaneous every morning  insulin lispro (HumaLOG) corrective regimen sliding scale   SubCutaneous three times a day before meals  insulin lispro (HumaLOG) corrective regimen sliding scale   SubCutaneous at bedtime  dextrose 5%. 1000 milliLiter(s) (50 mL/Hr) IV Continuous <Continuous>  dextrose 50% Injectable 12.5 Gram(s) IV Push once  dextrose 50% Injectable 25 Gram(s) IV Push once  dextrose 50% Injectable 25 Gram(s) IV Push once  allopurinol 200 milliGRAM(s) Oral daily  atorvastatin 40 milliGRAM(s) Oral at bedtime  labetalol 200 milliGRAM(s) Oral three times a day  nystatin Powder 1 Application(s) Topical two times a day  senna 2 Tablet(s) Oral at bedtime  pantoprazole    Tablet 40 milliGRAM(s) Oral before breakfast  levothyroxine 50 MICROGram(s) Oral daily  cholecalciferol 1000 Unit(s) Oral daily  heparin  Injectable 5000 Unit(s) SubCutaneous every 8 hours    MEDICATIONS  (PRN):  dextrose Gel 1 Dose(s) Oral once PRN Blood Glucose LESS THAN 70 milliGRAM(s)/deciliter  glucagon  Injectable 1 milliGRAM(s) IntraMuscular once PRN Glucose LESS THAN 70 milligrams/deciliter  ALBUTerol    90 MICROgram(s) HFA Inhaler 1 Puff(s) Inhalation every 4 hours PRN Shortness of Breath and/or Wheezing        CAPILLARY BLOOD GLUCOSE  129 (2017 12:13)  127 (2017 22:26)  171 (2017 18:13)  225 (2017 15:10)        I&O's Summary    2017 07:01  -  2017 07:00  --------------------------------------------------------  IN: 0 mL / OUT: 150 mL / NET: -150 mL    2017 07:01  -  2017 13:10  --------------------------------------------------------  IN: 0 mL / OUT: 2100 mL / NET: -2100 mL    Vital Signs Last 24 Hrs  T(C): 37 (2017 11:13), Max: 37 (2017 03:00)  T(F): 98.6 (:13), Max: 98.6 (2017 03:00)  HR: 70 (2017 11:13) (63 - 72)  BP: 150/72 (2017 11:13) (142/67 - 187/58)  BP(mean): --  RR: 18 (2017 11:13) (18 - 18)  SpO2: 100% (2017 11:13) (96% - 100%)    PHYSICAL EXAM:  GENERAL: Well-developed, well nourished, No acute distress  HEENT: MMM  NECK: Supple,   CHEST/LUNG: Clear to auscultation bilaterally;  HEART: S1/S2, Regular rate and rhythm;   ABDOMEN: Nondistended, NABS, soft,  nontender,   EXTREMITIES:  WWP, no pitting edema  PSYCH: AAOx3, normal affect  NEUROLOGY: moving all extremities purposefully  SKIN: No rashes or lesions    LABS: reviewed    Cr improved                        10.8   7.36  )-----------( 205      ( 2017 06:30 )             33.4     07-22    145  |  109<H>  |  17  ----------------------------<  91  4.1   |  25  |  0.98    Ca    8.5      2017 06:30  Phos  3.2     07-22  Mg     1.5     07-22    TPro  6.5  /  Alb  3.7  /  TBili  0.5  /  DBili  x   /  AST  24  /  ALT  18  /  AlkPhos  82  07-21          Urinalysis Basic - ( 2017 17:43 )    Color: YELLOW / Appearance: HAZY / S.023 / pH: 5.5  Gluc: NEGATIVE / Ketone: TRACE  / Bili: SMALL / Urobili: 2 E.U.   Blood: NEGATIVE / Protein: 20 / Nitrite: NEGATIVE   Leuk Esterase: NEGATIVE / RBC: 0-2 / WBC 2-5   Sq Epi: OCC / Non Sq Epi: x / Bacteria: x        RADIOLOGY & ADDITIONAL TESTS:    Imaging Personally Reviewed: Renal US: No evidence of hydronephrosis    Consultant(s) Notes Reviewed:      Care Discussed with Consultants/Other Providers: Patient is a 89y old  Female who presents with a chief complaint of Decrease urine output and elevated BP (2017 00:24)      SUBJECTIVE / OVERNIGHT EVENTS: No overnight events. Summers removed this am and pt seen voiding on commode. Denies any headaches, new visual changes. Denies abdominal pain.     MEDICATIONS  (STANDING): reviewed  cloNIDine Patch 0.3 mG/24Hr(s) 1 patch Topical <User Schedule>  amitriptyline 25 milliGRAM(s) Oral at bedtime  insulin glargine Injectable (LANTUS) 24 Unit(s) SubCutaneous every morning  insulin lispro (HumaLOG) corrective regimen sliding scale   SubCutaneous three times a day before meals  insulin lispro (HumaLOG) corrective regimen sliding scale   SubCutaneous at bedtime  dextrose 5%. 1000 milliLiter(s) (50 mL/Hr) IV Continuous <Continuous>  dextrose 50% Injectable 12.5 Gram(s) IV Push once  dextrose 50% Injectable 25 Gram(s) IV Push once  dextrose 50% Injectable 25 Gram(s) IV Push once  allopurinol 200 milliGRAM(s) Oral daily  atorvastatin 40 milliGRAM(s) Oral at bedtime  labetalol 200 milliGRAM(s) Oral three times a day  nystatin Powder 1 Application(s) Topical two times a day  senna 2 Tablet(s) Oral at bedtime  pantoprazole    Tablet 40 milliGRAM(s) Oral before breakfast  levothyroxine 50 MICROGram(s) Oral daily  cholecalciferol 1000 Unit(s) Oral daily  heparin  Injectable 5000 Unit(s) SubCutaneous every 8 hours    MEDICATIONS  (PRN):  dextrose Gel 1 Dose(s) Oral once PRN Blood Glucose LESS THAN 70 milliGRAM(s)/deciliter  glucagon  Injectable 1 milliGRAM(s) IntraMuscular once PRN Glucose LESS THAN 70 milligrams/deciliter  ALBUTerol    90 MICROgram(s) HFA Inhaler 1 Puff(s) Inhalation every 4 hours PRN Shortness of Breath and/or Wheezing        CAPILLARY BLOOD GLUCOSE  129 (2017 12:13)  127 (2017 22:26)  171 (2017 18:13)  225 (2017 15:10)        I&O's Summary    2017 07:01  -  2017 07:00  --------------------------------------------------------  IN: 0 mL / OUT: 150 mL / NET: -150 mL    2017 07:01  -  2017 13:10  --------------------------------------------------------  IN: 0 mL / OUT: 2100 mL / NET: -2100 mL    Vital Signs Last 24 Hrs  T(C): 37 (2017 11:13), Max: 37 (2017 03:00)  T(F): 98.6 (2017 11:13), Max: 98.6 (2017 03:00)  HR: 70 (:13) (63 - 72)  BP: 150/72 (2017 11:13) (142/67 - 187/58)  BP(mean): --  RR: 18 (2017 11:13) (18 - 18)  SpO2: 100% (2017 11:13) (96% - 100%)    PHYSICAL EXAM:  GENERAL: Appears younger than stated age, Sitting on bedpan, No acute distress  HEENT: MMM  NECK: Supple,   CHEST/LUNG: Clear to auscultation bilaterally;  HEART: S1/S2, Regular rate and rhythm;   ABDOMEN: Nondistended, NABS, soft,  nontender,   EXTREMITIES:  WWP, no pitting edema  PSYCH: AAOx3, normal affect  NEUROLOGY: moving all extremities purposefully  SKIN: No rashes or lesions    LABS: reviewed    Cr improved                        10.8   7.36  )-----------( 205      ( 2017 06:30 )             33.4     07-22    145  |  109<H>  |  17  ----------------------------<  91  4.1   |  25  |  0.98    Ca    8.5      2017 06:30  Phos  3.2     07-22  Mg     1.5     07-22    TPro  6.5  /  Alb  3.7  /  TBili  0.5  /  DBili  x   /  AST  24  /  ALT  18  /  AlkPhos  82  07-21    Urinalysis Basic - ( 2017 17:43 )    Color: YELLOW / Appearance: HAZY / S.023 / pH: 5.5  Gluc: NEGATIVE / Ketone: TRACE  / Bili: SMALL / Urobili: 2 E.U.   Blood: NEGATIVE / Protein: 20 / Nitrite: NEGATIVE   Leuk Esterase: NEGATIVE / RBC: 0-2 / WBC 2-5   Sq Epi: OCC / Non Sq Epi: x / Bacteria: x    Urine lytes  FeNa 1.02%    RADIOLOGY & ADDITIONAL TESTS:    Imaging Personally Reviewed: Renal US: No evidence of hydronephrosis    Consultant(s) Notes Reviewed:      Care Discussed with Consultants/Other Providers: Patient is a 89y old  Female who presents with a chief complaint of Decrease urine output and elevated BP (2017 00:24)      SUBJECTIVE / OVERNIGHT EVENTS: No overnight events. Summers removed this am and pt seen voiding on commode. Denies any headaches, new visual changes. Denies abdominal pain. Requesting to go home    MEDICATIONS  (STANDING): reviewed  cloNIDine Patch 0.3 mG/24Hr(s) 1 patch Topical <User Schedule>  amitriptyline 25 milliGRAM(s) Oral at bedtime  insulin glargine Injectable (LANTUS) 24 Unit(s) SubCutaneous every morning  insulin lispro (HumaLOG) corrective regimen sliding scale   SubCutaneous three times a day before meals  insulin lispro (HumaLOG) corrective regimen sliding scale   SubCutaneous at bedtime  dextrose 5%. 1000 milliLiter(s) (50 mL/Hr) IV Continuous <Continuous>  dextrose 50% Injectable 12.5 Gram(s) IV Push once  dextrose 50% Injectable 25 Gram(s) IV Push once  dextrose 50% Injectable 25 Gram(s) IV Push once  allopurinol 200 milliGRAM(s) Oral daily  atorvastatin 40 milliGRAM(s) Oral at bedtime  labetalol 200 milliGRAM(s) Oral three times a day  nystatin Powder 1 Application(s) Topical two times a day  senna 2 Tablet(s) Oral at bedtime  pantoprazole    Tablet 40 milliGRAM(s) Oral before breakfast  levothyroxine 50 MICROGram(s) Oral daily  cholecalciferol 1000 Unit(s) Oral daily  heparin  Injectable 5000 Unit(s) SubCutaneous every 8 hours    MEDICATIONS  (PRN):  dextrose Gel 1 Dose(s) Oral once PRN Blood Glucose LESS THAN 70 milliGRAM(s)/deciliter  glucagon  Injectable 1 milliGRAM(s) IntraMuscular once PRN Glucose LESS THAN 70 milligrams/deciliter  ALBUTerol    90 MICROgram(s) HFA Inhaler 1 Puff(s) Inhalation every 4 hours PRN Shortness of Breath and/or Wheezing        CAPILLARY BLOOD GLUCOSE  129 (2017 12:13)  127 (2017 22:26)  171 (2017 18:13)  225 (2017 15:10)        I&O's Summary    2017 07:01  -  2017 07:00  --------------------------------------------------------  IN: 0 mL / OUT: 150 mL / NET: -150 mL    2017 07:01  -  2017 13:10  --------------------------------------------------------  IN: 0 mL / OUT: 2100 mL / NET: -2100 mL    Vital Signs Last 24 Hrs  T(C): 37 (2017 11:13), Max: 37 (2017 03:00)  T(F): 98.6 (:), Max: 98.6 (2017 03:00)  HR: 70 (2017 11:13) (63 - 72)  BP: 150/72 (:13) (142/67 - 187/58)  BP(mean): --  RR: 18 (2017 11:13) (18 - 18)  SpO2: 100% (2017 11:13) (96% - 100%)    PHYSICAL EXAM:  GENERAL: Appears younger than stated age, Sitting on bedpan, No acute distress  HEENT: MMM  NECK: Supple,   CHEST/LUNG: Clear to auscultation bilaterally;  HEART: S1/S2, Regular rate and rhythm;   ABDOMEN: Nondistended, NABS, soft,  nontender,   EXTREMITIES:  WWP, no pitting edema  PSYCH: AAOx3, normal affect  NEUROLOGY: moving all extremities purposefully  SKIN: No rashes or lesions    LABS: reviewed    Cr improved                        10.8   7.36  )-----------( 205      ( 2017 06:30 )             33.4     07-22    145  |  109<H>  |  17  ----------------------------<  91  4.1   |  25  |  0.98    Ca    8.5      2017 06:30  Phos  3.2     07-22  Mg     1.5     07-22    TPro  6.5  /  Alb  3.7  /  TBili  0.5  /  DBili  x   /  AST  24  /  ALT  18  /  AlkPhos  82  07-21    Urinalysis Basic - ( 2017 17:43 )    Color: YELLOW / Appearance: HAZY / S.023 / pH: 5.5  Gluc: NEGATIVE / Ketone: TRACE  / Bili: SMALL / Urobili: 2 E.U.   Blood: NEGATIVE / Protein: 20 / Nitrite: NEGATIVE   Leuk Esterase: NEGATIVE / RBC: 0-2 / WBC 2-5   Sq Epi: OCC / Non Sq Epi: x / Bacteria: x    Urine lytes  FeNa 1.02%    RADIOLOGY & ADDITIONAL TESTS:    Imaging Personally Reviewed: Renal US: No evidence of hydronephrosis    Consultant(s) Notes Reviewed:      Care Discussed with Consultants/Other Providers:

## 2017-07-22 NOTE — PROGRESS NOTE ADULT - PROBLEM SELECTOR PLAN 1
-patient's antihypertensive regimen includes benazepril (to be given if SBP > 150), clonidine patch, labetalol. Patient was not discharged on any diuretics. Patient had previously been on lasix, aldactone, HCTZ, eplerenone and was admitted 4/23  -I am concerned about patient's ability to manage medications vs unclear understanding of which medications to take given that she has had now three admissions recently related to antihypertensive medications/diuretics  -hyaline casts in urine c/w dehydration vs diuretic use  -patient received 2L NS in ED, Cr improved   -FeNa c/w   - mckeon d/robin. Pt voiding. Will monitor throughout the day for any signs of retention -patient's antihypertensive regimen includes benazepril (to be given if SBP > 150), clonidine patch, labetalol. Patient was not discharged on any diuretics. Patient had previously been on lasix, aldactone, HCTZ, eplerenone and was admitted 4/23  -Concerned about patient's ability to manage medications vs unclear understanding of which medications to take given that she has had now three admissions recently related to antihypertensive medications/diuretics  -hyaline casts in urine c/w dehydration vs diuretic use  -patient received 2L NS in ED, Cr improved   - FeNa 1.02%  - mckeon d/robin. Pt voiding. Will monitor throughout the day for any signs of retention - FeNa 1.02%  -patient's antihypertensive regimen includes benazepril (to be given if SBP > 150), clonidine patch, labetalol. Patient was not discharged on any diuretics. Patient had previously been on lasix, aldactone, HCTZ, eplerenone and was admitted 4/23  -Concerned about patient's ability to manage medications vs unclear understanding of which medications to take given that she has had now three admissions recently related to antihypertensive medications/diuretics  -hyaline casts in urine c/w dehydration vs diuretic use  -patient received 2L NS in ED, Cr improved. Likely 2/2 dehydration? lasix  - mckeon d/robin. Pt voiding. Will monitor throughout the day for any signs of retention

## 2017-07-22 NOTE — H&P ADULT - PROBLEM SELECTOR PLAN 1
-patient's antihypertensive regimen includes benazepril (to be given if SBP > 150), clonidine patch, labetalol. Patient was not discharged on any diuretics. Patient had previously been on lasix, aldactone, HCTZ, eplerenone and was admitted 4/23  -I am concerned about patient's ability to manage medications vs unclear understanding of which medications to take given that she has had now three admissions recently related to antihypertensive medications/diuretics  -hyaline casts in urine c/w dehydration vs diuretic use  -patient received 2L NS in ED, monitor serum creatinine for improvement  -f/u urine electrolytes  -mckeon placed for suspected urinary retention; would elect to discontinue mckeon in AM as patient unlikely with retention and more likely had oliguria 2/2 prerenal FLETCHER -patient's antihypertensive regimen includes benazepril (to be given if SBP > 150), clonidine patch, labetalol. Patient was not discharged on any diuretics. Patient had previously been on lasix, aldactone, HCTZ, eplerenone and was admitted 4/23  -I am concerned about patient's ability to manage medications vs unclear understanding of which medications to take given that she has had now three admissions recently related to antihypertensive medications/diuretics  -hyaline casts in urine c/w dehydration vs diuretic use  -patient received 2L NS in ED, monitor serum creatinine for improvement  -f/u urine electrolytes  -mckeon placed for suspected urinary retention; would elect to discontinue mckeon in AM as patient unlikely with retention and more likely had oliguria 2/2 prerenal FLETCHER  -consider FLETCHER 2/2 hypotension given pt reports SBP in 90s at home; monitor BP q 4 hours. Hold labetalol if SBP < 150.

## 2017-07-22 NOTE — PROGRESS NOTE ADULT - ATTENDING COMMENTS
Pt's FLETCHER improved. Summers d/robin and she is voiding this am. Will monitor urine output throughout the day. Monitor FG while on lantus.  Monitor BP, plan to resume all home meds. Pt's FLETCHER improved. Summers d/robin and she is voiding this am. Will monitor urine output throughout the day. Monitor FG while on lantus.  Monitor BP, plan to resume ACEI today and monitor .

## 2017-07-22 NOTE — H&P ADULT - NSHPREVIEWOFSYSTEMS_GEN_ALL_CORE
General: no fever, no chills  Ophthalmologic: no change in vision  ENMT: no sore throat  Respiratory and Thorax: no SOB, no cough  Cardiovascular: no CP  Gastrointestinal: no abd pain, N/V/D  Genitourinary: no dysuria  Musculoskeletal: no joint pain  Neurological: no HA  Psychiatric: no anxiety/depression  Hematology/Lymphatics: no abnormal bleeding  Endocrine: no changes in weight

## 2017-07-22 NOTE — PROGRESS NOTE ADULT - ASSESSMENT
89/F with HTN, HLD, GERD, gout, hypothyroidism, recent right hip hemiarthroplasty, type 2 diabetes, recent admission for hypertensive urgency, admitted with FLETCHER. 89/F with HTN, HLD, GERD, gout, hypothyroidism, recent right hip hemiarthroplasty, type 2 diabetes, recent admission for hypertensive urgency, admitted with FLETCHER now improved w/ IVF and mckeon. Mckeon d/robin for trial of void today

## 2017-07-22 NOTE — H&P ADULT - ATTENDING COMMENTS
Patient was seen and evaluated, agree with above with the following additions.     A/P 89 y.o. woman with multiple medical co-morbidities now with fletcher and poorly controlled essential hypertension    # FLETCHER   - Likely secondary to prerenal azotemia   - Patient received 2 L of NS in ER   - Will hold further IVF rehydration pending repeat BMP    - Strict I/Os   - TOV once urine output increases    # Essential hypertension   - BP improved in AM   - Continue with current treatments   - Will give patient clear instructions on antihypertensive medications upon discharge   - Short interval follow up with PMD upon discharge

## 2017-07-22 NOTE — H&P ADULT - NSHPPHYSICALEXAM_GEN_ALL_CORE
Vital Signs Last 24 Hrs  T(C): 36.5 (21 Jul 2017 22:26), Max: 36.7 (21 Jul 2017 15:10)  T(F): 97.7 (21 Jul 2017 22:26), Max: 98 (21 Jul 2017 15:10)  HR: 71 (21 Jul 2017 22:26) (63 - 72)  BP: 179/70 (21 Jul 2017 22:26) (142/67 - 187/58)  BP(mean): --  RR: 18 (21 Jul 2017 22:26) (18 - 18)  SpO2: 98% (21 Jul 2017 22:26) (97% - 99%)    Constitutional: NAD  Eyes: clear conjunctiva  ENMT: dry oral mucosa  Respiratory: CTA b/l  Cardiovascular: S1, S2, RR  Gastrointestinal: soft, NT, ND  Genitourinary: no suprapubic tenderness, mckeon draining light yellow urine  Extremities: 1+ bilateral pitting leg edema  Neurological: AAO x 3  Skin: warm, dry  Psychiatric: normal affect

## 2017-07-22 NOTE — PROGRESS NOTE ADULT - PROBLEM SELECTOR PLAN 3
-patient discharged on lantus 24 units in AM and 26 units in PM  -hbA1c 6.7.  -last admission patient had no order for PM lantus and FS are not above 200 as recorded in sunrise  -I would not discharge this patient on BID lantus  -continue with AM 24 units lantus for now and monitor FS  -Patient is 89 years old with target A1c > 7, would not give BID lantus for concern for hypoglycemia. -patient discharged on lantus 24 units in AM and 26 units in PM  -hbA1c 6.7.  -last admission patient had no order for PM lantus and FS are not above 200 as recorded in sunrise  - FG controlled today.   - Agree that I would not discharge this patient on BID lantus  -continue with AM 24 units lantus for now and monitor FS  -Patient is 89 years old with target A1c > 7, would not give BID lantus for concern for hypoglycemia.

## 2017-07-22 NOTE — H&P ADULT - HISTORY OF PRESENT ILLNESS
Patient is a 89 year old woman with HTN, HLD, GERD, gout, hypothyroidism, recent right hip hemiarthroplasty, type 2 diabetes who comes to ED after discharge this Thursday 7/20 where she was admitted for hypertensive urgency with complaint of oliguria x 1 day. She went home post discharge and urinated a small amount one time. She also had a bowel movement but had no further urination. She endorses feeling urge to urinate/pressure however unable to do so. She states that all these problems started to arise after her hip surgery. She denies fever, chills, dysuria, hematuria. She states she did have some back pain but wore a back brace and the pain resolved prior to her coming to the hospital. She has not missed her antihypertensive medications. She did check her blood pressure this morning and found it to be 90/43. Yesterday her blood pressures were "normal" in the 160s.     In ED: VS: T97.7F, HR 71, /70, RR 18, SpO2 98% on RA. Initial BP on ED triage was 142/67 Patient in ED received. 2L NS bolus. Mckeon placed with 100 cc urine drained.     Last admission patient had hypertensive urgency with SBP > 200s with no signs of end organ damage. Patient had renal US with doppler to assess for renal vein thrombosis however study was limited due to body habitus and bowel gas however no obvious evidence of thrombosis noted in study report. Patient also had renal US completed this admission which showed no hydronephrosis or calculus and bladder collapsed around mckeon.     Patient had admission 4/23/17 for FLETCHER in setting of using lasix, aldactone, HCTZ, eplerenone.

## 2017-07-22 NOTE — H&P ADULT - NSHPSOCIALHISTORY_GEN_ALL_CORE
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

## 2017-07-23 LAB
ALBUMIN SERPL ELPH-MCNC: 3.2 G/DL — LOW (ref 3.3–5)
ALP SERPL-CCNC: 68 U/L — SIGNIFICANT CHANGE UP (ref 40–120)
ALT FLD-CCNC: 15 U/L — SIGNIFICANT CHANGE UP (ref 4–33)
AST SERPL-CCNC: 20 U/L — SIGNIFICANT CHANGE UP (ref 4–32)
BACTERIA UR CULT: SIGNIFICANT CHANGE UP
BASOPHILS # BLD AUTO: 0.06 K/UL — SIGNIFICANT CHANGE UP (ref 0–0.2)
BASOPHILS NFR BLD AUTO: 0.8 % — SIGNIFICANT CHANGE UP (ref 0–2)
BILIRUB SERPL-MCNC: 0.7 MG/DL — SIGNIFICANT CHANGE UP (ref 0.2–1.2)
BUN SERPL-MCNC: 10 MG/DL — SIGNIFICANT CHANGE UP (ref 7–23)
CALCIUM SERPL-MCNC: 8.7 MG/DL — SIGNIFICANT CHANGE UP (ref 8.4–10.5)
CHLORIDE SERPL-SCNC: 106 MMOL/L — SIGNIFICANT CHANGE UP (ref 98–107)
CO2 SERPL-SCNC: 25 MMOL/L — SIGNIFICANT CHANGE UP (ref 22–31)
CREAT SERPL-MCNC: 0.81 MG/DL — SIGNIFICANT CHANGE UP (ref 0.5–1.3)
EOSINOPHIL # BLD AUTO: 1.09 K/UL — HIGH (ref 0–0.5)
EOSINOPHIL NFR BLD AUTO: 14.7 % — HIGH (ref 0–6)
GLUCOSE SERPL-MCNC: 101 MG/DL — HIGH (ref 70–99)
HCT VFR BLD CALC: 34.4 % — LOW (ref 34.5–45)
HGB BLD-MCNC: 11.2 G/DL — LOW (ref 11.5–15.5)
IMM GRANULOCYTES # BLD AUTO: 0.02 # — SIGNIFICANT CHANGE UP
IMM GRANULOCYTES NFR BLD AUTO: 0.3 % — SIGNIFICANT CHANGE UP (ref 0–1.5)
LYMPHOCYTES # BLD AUTO: 1.98 K/UL — SIGNIFICANT CHANGE UP (ref 1–3.3)
LYMPHOCYTES # BLD AUTO: 26.7 % — SIGNIFICANT CHANGE UP (ref 13–44)
MCHC RBC-ENTMCNC: 29 PG — SIGNIFICANT CHANGE UP (ref 27–34)
MCHC RBC-ENTMCNC: 32.6 % — SIGNIFICANT CHANGE UP (ref 32–36)
MCV RBC AUTO: 89.1 FL — SIGNIFICANT CHANGE UP (ref 80–100)
MONOCYTES # BLD AUTO: 0.69 K/UL — SIGNIFICANT CHANGE UP (ref 0–0.9)
MONOCYTES NFR BLD AUTO: 9.3 % — SIGNIFICANT CHANGE UP (ref 2–14)
NEUTROPHILS # BLD AUTO: 3.57 K/UL — SIGNIFICANT CHANGE UP (ref 1.8–7.4)
NEUTROPHILS NFR BLD AUTO: 48.2 % — SIGNIFICANT CHANGE UP (ref 43–77)
NRBC # FLD: 0 — SIGNIFICANT CHANGE UP
PLATELET # BLD AUTO: 216 K/UL — SIGNIFICANT CHANGE UP (ref 150–400)
PMV BLD: 10.7 FL — SIGNIFICANT CHANGE UP (ref 7–13)
POTASSIUM SERPL-MCNC: 4.2 MMOL/L — SIGNIFICANT CHANGE UP (ref 3.5–5.3)
POTASSIUM SERPL-SCNC: 4.2 MMOL/L — SIGNIFICANT CHANGE UP (ref 3.5–5.3)
PROT SERPL-MCNC: 6 G/DL — SIGNIFICANT CHANGE UP (ref 6–8.3)
RBC # BLD: 3.86 M/UL — SIGNIFICANT CHANGE UP (ref 3.8–5.2)
RBC # FLD: 16.3 % — HIGH (ref 10.3–14.5)
SODIUM SERPL-SCNC: 144 MMOL/L — SIGNIFICANT CHANGE UP (ref 135–145)
SPECIMEN SOURCE: SIGNIFICANT CHANGE UP
WBC # BLD: 7.41 K/UL — SIGNIFICANT CHANGE UP (ref 3.8–10.5)
WBC # FLD AUTO: 7.41 K/UL — SIGNIFICANT CHANGE UP (ref 3.8–10.5)

## 2017-07-23 PROCEDURE — 99233 SBSQ HOSP IP/OBS HIGH 50: CPT

## 2017-07-23 RX ADMIN — NYSTATIN CREAM 1 APPLICATION(S): 100000 CREAM TOPICAL at 06:03

## 2017-07-23 RX ADMIN — HEPARIN SODIUM 5000 UNIT(S): 5000 INJECTION INTRAVENOUS; SUBCUTANEOUS at 06:01

## 2017-07-23 RX ADMIN — Medication 1000 UNIT(S): at 12:55

## 2017-07-23 RX ADMIN — ATORVASTATIN CALCIUM 40 MILLIGRAM(S): 80 TABLET, FILM COATED ORAL at 22:18

## 2017-07-23 RX ADMIN — Medication 200 MILLIGRAM(S): at 12:54

## 2017-07-23 RX ADMIN — NYSTATIN CREAM 1 APPLICATION(S): 100000 CREAM TOPICAL at 17:14

## 2017-07-23 RX ADMIN — Medication 200 MILLIGRAM(S): at 06:02

## 2017-07-23 RX ADMIN — LISINOPRIL 20 MILLIGRAM(S): 2.5 TABLET ORAL at 06:01

## 2017-07-23 RX ADMIN — SENNA PLUS 2 TABLET(S): 8.6 TABLET ORAL at 22:19

## 2017-07-23 RX ADMIN — Medication 200 MILLIGRAM(S): at 13:50

## 2017-07-23 RX ADMIN — PANTOPRAZOLE SODIUM 40 MILLIGRAM(S): 20 TABLET, DELAYED RELEASE ORAL at 06:02

## 2017-07-23 RX ADMIN — HEPARIN SODIUM 5000 UNIT(S): 5000 INJECTION INTRAVENOUS; SUBCUTANEOUS at 13:50

## 2017-07-23 RX ADMIN — Medication 50 MICROGRAM(S): at 06:04

## 2017-07-23 RX ADMIN — HEPARIN SODIUM 5000 UNIT(S): 5000 INJECTION INTRAVENOUS; SUBCUTANEOUS at 22:18

## 2017-07-23 RX ADMIN — Medication 25 MILLIGRAM(S): at 22:18

## 2017-07-23 RX ADMIN — Medication 200 MILLIGRAM(S): at 22:19

## 2017-07-23 RX ADMIN — INSULIN GLARGINE 24 UNIT(S): 100 INJECTION, SOLUTION SUBCUTANEOUS at 08:40

## 2017-07-23 NOTE — PROGRESS NOTE ADULT - PROBLEM SELECTOR PLAN 1
- FeNa 1.02%  -patient's antihypertensive regimen includes benazepril (to be given if SBP > 150), clonidine patch, labetalol. Patient was not discharged on any diuretics. Patient had previously been on lasix, aldactone, HCTZ, eplerenone and was admitted 4/23  -Concerned about patient's ability to manage medications vs unclear understanding of which medications to take given that she has had now three admissions recently related to antihypertensive medications/diuretics  -patient received 2L NS in ED, Cr improved and stable Likely 2/2 dehydration? lasix  - Spoke w/ patient's daughter Luz Patton who reports patient is quite good w/ medications. Asking us to monitor her for an additional day.  Ensure medications reviewed in detail prior to d/c. Denies any anticholinergic medications at home. Pt reportedly eating and drinking well  - mckeon d/robin. Pt voiding well. Monitor for signs of urinary retention

## 2017-07-23 NOTE — PROGRESS NOTE ADULT - ASSESSMENT
89/F with HTN, HLD, GERD, gout, hypothyroidism, recent right hip hemiarthroplasty, type 2 diabetes, recent admission for hypertensive urgency, admitted with FLETCHER now improved w/ IVF and mckeon. Mckeon d/robin for trial of void today

## 2017-07-23 NOTE — PROGRESS NOTE ADULT - SUBJECTIVE AND OBJECTIVE BOX
Patient is a 89y old  Female who presents with a chief complaint of Decrease urine output and elevated BP (2017 00:24)      SUBJECTIVE / OVERNIGHT EVENTS: No overnight events. Voiding well. No headaches, no chest pain. Eating and drinking well.     MEDICATIONS  (STANDING): reviewed  cloNIDine Patch 0.3 mG/24Hr(s) 1 patch Topical <User Schedule>  amitriptyline 25 milliGRAM(s) Oral at bedtime  insulin glargine Injectable (LANTUS) 24 Unit(s) SubCutaneous every morning  insulin lispro (HumaLOG) corrective regimen sliding scale   SubCutaneous three times a day before meals  insulin lispro (HumaLOG) corrective regimen sliding scale   SubCutaneous at bedtime  dextrose 5%. 1000 milliLiter(s) (50 mL/Hr) IV Continuous <Continuous>  dextrose 50% Injectable 12.5 Gram(s) IV Push once  dextrose 50% Injectable 25 Gram(s) IV Push once  dextrose 50% Injectable 25 Gram(s) IV Push once  allopurinol 200 milliGRAM(s) Oral daily  atorvastatin 40 milliGRAM(s) Oral at bedtime  labetalol 200 milliGRAM(s) Oral three times a day  nystatin Powder 1 Application(s) Topical two times a day  senna 2 Tablet(s) Oral at bedtime  pantoprazole    Tablet 40 milliGRAM(s) Oral before breakfast  levothyroxine 50 MICROGram(s) Oral daily  cholecalciferol 1000 Unit(s) Oral daily  heparin  Injectable 5000 Unit(s) SubCutaneous every 8 hours  lisinopril 20 milliGRAM(s) Oral daily    MEDICATIONS  (PRN):  dextrose Gel 1 Dose(s) Oral once PRN Blood Glucose LESS THAN 70 milliGRAM(s)/deciliter  glucagon  Injectable 1 milliGRAM(s) IntraMuscular once PRN Glucose LESS THAN 70 milligrams/deciliter  ALBUTerol    90 MICROgram(s) HFA Inhaler 1 Puff(s) Inhalation every 4 hours PRN Shortness of Breath and/or Wheezing        CAPILLARY BLOOD GLUCOSE  170 (2017 12:08)  107 (2017 08:31)  142 (2017 22:04)  117 (2017 17:17)        I&O's Summary    2017 07:01  -  2017 07:00  --------------------------------------------------------  IN: 0 mL / OUT: 3150 mL / NET: -3150 mL    Vital Signs Last 24 Hrs  T(C): 36.7 (2017 05:28), Max: 37.7 (2017 14:40)  T(F): 98 (2017 05:28), Max: 99.8 (2017 14:40)  HR: 62 (2017 05:28) (62 - 105)  BP: 153/62 (2017 05:28) (110/56 - 179/73)  BP(mean): --  RR: 18 (2017 05:28) (16 - 18)  SpO2: 96% (2017 05:28) (95% - 97%)    PHYSICAL EXAM:  GENERAL: Well-developed,  well nourished, No acute distress  HEENT: MMM  NECK: Supple, No JVD, no LAD  CHEST/LUNG: Clear to auscultation bilaterally; no wheezes, rhonchi, or rales  HEART: S1/S2, Regular rate and rhythm;   ABDOMEN: Nondistended, NABS, soft,  nontender,   EXTREMITIES:  2+ Peripheral Pulses, no clubbing, cyanosis, or edema  PSYCH: AAOx3, normal affect      LABS: reviewed                         11.2   7.41  )-----------( 216      ( 2017 06:00 )             34.4     07-23    144  |  106  |  10  ----------------------------<  101<H>  4.2   |  25  |  0.81    Ca    8.7      2017 06:00  Phos  3.2     07-22  Mg     1.5     07-22    TPro  6.0  /  Alb  3.2<L>  /  TBili  0.7  /  DBili  x   /  AST  20  /  ALT  15  /  AlkPhos  68  07-23          Urinalysis Basic - ( 2017 17:43 )    Color: YELLOW / Appearance: HAZY / S.023 / pH: 5.5  Gluc: NEGATIVE / Ketone: TRACE  / Bili: SMALL / Urobili: 2 E.U.   Blood: NEGATIVE / Protein: 20 / Nitrite: NEGATIVE   Leuk Esterase: NEGATIVE / RBC: 0-2 / WBC 2-5   Sq Epi: OCC / Non Sq Epi: x / Bacteria: x        RADIOLOGY & ADDITIONAL TESTS:    Imaging Personally Reviewed:    Consultant(s) Notes Reviewed:      Care Discussed with Consultants/Other Providers:

## 2017-07-23 NOTE — PROGRESS NOTE ADULT - PROBLEM SELECTOR PLAN 3
-patient discharged on lantus 24 units in AM and 26 units in PM  -hbA1c 6.7.  -last admission patient had no order for PM lantus and FS are not above 200 as recorded in sunrise  - FG controlled today.   - Pt reports she doesn't take pm lantus. Ensure she is only d/robin on once daily lantus  -continue with AM 24 units lantus for now and monitor FS  -Patient is 89 years old with target A1c > 7, would not give BID lantus for concern for hypoglycemia.

## 2017-07-23 NOTE — PROGRESS NOTE ADULT - ATTENDING COMMENTS
Pt remains stable. Spoke w/ daughter at length today. Unclear what triggered dehydration as pt reportedly not on diuretics at home, maintains good po intake. Denies any anticholinergic meds. Ensure medications reviewed in detail w/ pt and family prior to d/c. Ensure close outpatient f/u.

## 2017-07-23 NOTE — PROVIDER CONTACT NOTE (OTHER) - ACTION/TREATMENT ORDERED:
Provider aware, retake BP in an hour Provider aware, provider requested to retake BP in an hour. BP an hour later was 150/80 provider aware.

## 2017-07-24 ENCOUNTER — TRANSCRIPTION ENCOUNTER (OUTPATIENT)
Age: 82
End: 2017-07-24

## 2017-07-24 VITALS
RESPIRATION RATE: 18 BRPM | HEART RATE: 60 BPM | SYSTOLIC BLOOD PRESSURE: 151 MMHG | OXYGEN SATURATION: 99 % | DIASTOLIC BLOOD PRESSURE: 66 MMHG | TEMPERATURE: 98 F

## 2017-07-24 LAB
ALBUMIN SERPL ELPH-MCNC: 3.3 G/DL — SIGNIFICANT CHANGE UP (ref 3.3–5)
ALP SERPL-CCNC: 78 U/L — SIGNIFICANT CHANGE UP (ref 40–120)
ALT FLD-CCNC: 20 U/L — SIGNIFICANT CHANGE UP (ref 4–33)
AST SERPL-CCNC: 26 U/L — SIGNIFICANT CHANGE UP (ref 4–32)
BASOPHILS # BLD AUTO: 0.08 K/UL — SIGNIFICANT CHANGE UP (ref 0–0.2)
BASOPHILS NFR BLD AUTO: 1 % — SIGNIFICANT CHANGE UP (ref 0–2)
BILIRUB SERPL-MCNC: 0.6 MG/DL — SIGNIFICANT CHANGE UP (ref 0.2–1.2)
BUN SERPL-MCNC: 10 MG/DL — SIGNIFICANT CHANGE UP (ref 7–23)
CALCIUM SERPL-MCNC: 8.9 MG/DL — SIGNIFICANT CHANGE UP (ref 8.4–10.5)
CHLORIDE SERPL-SCNC: 107 MMOL/L — SIGNIFICANT CHANGE UP (ref 98–107)
CO2 SERPL-SCNC: 23 MMOL/L — SIGNIFICANT CHANGE UP (ref 22–31)
CREAT SERPL-MCNC: 0.88 MG/DL — SIGNIFICANT CHANGE UP (ref 0.5–1.3)
EOSINOPHIL # BLD AUTO: 1.24 K/UL — HIGH (ref 0–0.5)
EOSINOPHIL NFR BLD AUTO: 15.8 % — HIGH (ref 0–6)
GLUCOSE SERPL-MCNC: 139 MG/DL — HIGH (ref 70–99)
HCT VFR BLD CALC: 35.9 % — SIGNIFICANT CHANGE UP (ref 34.5–45)
HGB BLD-MCNC: 11.1 G/DL — LOW (ref 11.5–15.5)
IMM GRANULOCYTES # BLD AUTO: 0.02 # — SIGNIFICANT CHANGE UP
IMM GRANULOCYTES NFR BLD AUTO: 0.3 % — SIGNIFICANT CHANGE UP (ref 0–1.5)
LYMPHOCYTES # BLD AUTO: 1.87 K/UL — SIGNIFICANT CHANGE UP (ref 1–3.3)
LYMPHOCYTES # BLD AUTO: 23.9 % — SIGNIFICANT CHANGE UP (ref 13–44)
MCHC RBC-ENTMCNC: 27.5 PG — SIGNIFICANT CHANGE UP (ref 27–34)
MCHC RBC-ENTMCNC: 30.9 % — LOW (ref 32–36)
MCV RBC AUTO: 89.1 FL — SIGNIFICANT CHANGE UP (ref 80–100)
MONOCYTES # BLD AUTO: 0.7 K/UL — SIGNIFICANT CHANGE UP (ref 0–0.9)
MONOCYTES NFR BLD AUTO: 8.9 % — SIGNIFICANT CHANGE UP (ref 2–14)
NEUTROPHILS # BLD AUTO: 3.92 K/UL — SIGNIFICANT CHANGE UP (ref 1.8–7.4)
NEUTROPHILS NFR BLD AUTO: 50.1 % — SIGNIFICANT CHANGE UP (ref 43–77)
NRBC # FLD: 0 — SIGNIFICANT CHANGE UP
PLATELET # BLD AUTO: 239 K/UL — SIGNIFICANT CHANGE UP (ref 150–400)
PMV BLD: 10.8 FL — SIGNIFICANT CHANGE UP (ref 7–13)
POTASSIUM SERPL-MCNC: 4.3 MMOL/L — SIGNIFICANT CHANGE UP (ref 3.5–5.3)
POTASSIUM SERPL-SCNC: 4.3 MMOL/L — SIGNIFICANT CHANGE UP (ref 3.5–5.3)
PROT SERPL-MCNC: 6.1 G/DL — SIGNIFICANT CHANGE UP (ref 6–8.3)
RBC # BLD: 4.03 M/UL — SIGNIFICANT CHANGE UP (ref 3.8–5.2)
RBC # FLD: 16.1 % — HIGH (ref 10.3–14.5)
SODIUM SERPL-SCNC: 142 MMOL/L — SIGNIFICANT CHANGE UP (ref 135–145)
WBC # BLD: 7.83 K/UL — SIGNIFICANT CHANGE UP (ref 3.8–10.5)
WBC # FLD AUTO: 7.83 K/UL — SIGNIFICANT CHANGE UP (ref 3.8–10.5)

## 2017-07-24 PROCEDURE — 99239 HOSP IP/OBS DSCHRG MGMT >30: CPT

## 2017-07-24 RX ORDER — ENOXAPARIN SODIUM 100 MG/ML
24 INJECTION SUBCUTANEOUS
Qty: 0 | Refills: 0 | COMMUNITY

## 2017-07-24 RX ADMIN — NYSTATIN CREAM 1 APPLICATION(S): 100000 CREAM TOPICAL at 05:54

## 2017-07-24 RX ADMIN — Medication 50 MICROGRAM(S): at 05:54

## 2017-07-24 RX ADMIN — HEPARIN SODIUM 5000 UNIT(S): 5000 INJECTION INTRAVENOUS; SUBCUTANEOUS at 05:54

## 2017-07-24 RX ADMIN — LISINOPRIL 20 MILLIGRAM(S): 2.5 TABLET ORAL at 05:54

## 2017-07-24 RX ADMIN — Medication 1000 UNIT(S): at 11:16

## 2017-07-24 RX ADMIN — Medication 200 MILLIGRAM(S): at 14:48

## 2017-07-24 RX ADMIN — PANTOPRAZOLE SODIUM 40 MILLIGRAM(S): 20 TABLET, DELAYED RELEASE ORAL at 06:00

## 2017-07-24 RX ADMIN — INSULIN GLARGINE 24 UNIT(S): 100 INJECTION, SOLUTION SUBCUTANEOUS at 08:30

## 2017-07-24 RX ADMIN — Medication 200 MILLIGRAM(S): at 11:16

## 2017-07-24 NOTE — DISCHARGE NOTE ADULT - MEDICATION SUMMARY - MEDICATIONS TO TAKE
I will START or STAY ON the medications listed below when I get home from the hospital:    acetaminophen 325 mg oral tablet  -- 2 tab(s) by mouth every 6 hours, As needed, Mild - Moderate Pain (1 - 6)  -- Indication: For pain    benazepril 20 mg oral tablet  -- 1 tab(s) by mouth once a day  -- Indication: For High blood pressure    cloNIDine 0.3 mg/24 hr transdermal film, extended release  -- 1 patch by mouth once a week  -- Indication: For High blood pressure    amitriptyline 25 mg oral tablet  -- 1 tab(s) by mouth once a day (at bedtime)  -- Indication: For depression    metFORMIN 500 mg oral tablet, extended release  -- 1 tab(s) by mouth 2 times a day  -- Indication: For diabetes    Lantus Solostar Pen 100 units/mL subcutaneous solution  -- 24 unit(s) in am   -- Indication: For diabetes- only in the morning    allopurinol 100 mg oral tablet  -- 2 tab(s) by mouth once a day  -- Indication: For Gout    atorvastatin 40 mg oral tablet  -- 1 tab(s) by mouth once a day (at bedtime)  -- Indication: For High cholesterol    labetalol 200 mg oral tablet  -- 1 tab(s) by mouth 3 times a day  -- Indication: For High blood pressure    albuterol 90 mcg/inh inhalation aerosol  -- 1 puff(s) inhaled every 4 hours, As needed, Shortness of Breath and/or Wheezing  -- Indication: For shortness of breath    nystatin 100,000 units/g topical powder  -- 1 application on skin 2 times a day  -- Indication: For Antifungal    senna oral tablet  -- 2 tab(s) by mouth once a day (at bedtime)  -- Indication: For constipation    omeprazole 20 mg oral delayed release capsule  -- 1 cap(s) by mouth once a day  -- Indication: For GERD (gastroesophageal reflux disease)    levothyroxine 50 mcg (0.05 mg) oral tablet  -- 1 tab(s) by mouth once a day  -- Indication: For Hypothyroidism    Vitamin D3 1000 intl units oral capsule  -- 1 cap(s) by mouth once a day  -- Indication: For supplement I will START or STAY ON the medications listed below when I get home from the hospital:    acetaminophen 325 mg oral tablet  -- 2 tab(s) by mouth every 6 hours, As needed, Mild - Moderate Pain (1 - 6)  -- Indication: For pain    benazepril 20 mg oral tablet  -- 1 tab(s) by mouth once a day  -- Indication: For High blood pressure    cloNIDine 0.3 mg/24 hr transdermal film, extended release  -- 1 patch by mouth once a week  -- Indication: For High blood pressure    amitriptyline 25 mg oral tablet  -- 1 tab(s) by mouth once a day (at bedtime)  -- Indication: For depression    metFORMIN 500 mg oral tablet, extended release  -- 1 tab(s) by mouth 2 times a day  -- Indication: For diabetes    Lantus Solostar Pen 100 units/mL subcutaneous solution  -- 24 unit(s) in am   -- Indication: For diabetes    allopurinol 100 mg oral tablet  -- 2 tab(s) by mouth once a day  -- Indication: For Gout    atorvastatin 40 mg oral tablet  -- 1 tab(s) by mouth once a day (at bedtime)  -- Indication: For Hyperlipidemia    labetalol 200 mg oral tablet  -- 1 tab(s) by mouth 3 times a day  -- Indication: For High blood pressure    albuterol 90 mcg/inh inhalation aerosol  -- 1 puff(s) inhaled every 4 hours, As needed, Shortness of Breath and/or Wheezing  -- Indication: For shortness of breath    nystatin 100,000 units/g topical powder  -- 1 application on skin 2 times a day  -- Indication: For dermatitis    senna oral tablet  -- 2 tab(s) by mouth once a day (at bedtime)  -- Indication: For bowel regimen    omeprazole 20 mg oral delayed release capsule  -- 1 cap(s) by mouth once a day  -- Indication: For GERD (gastroesophageal reflux disease)    levothyroxine 50 mcg (0.05 mg) oral tablet  -- 1 tab(s) by mouth once a day  -- Indication: For Hypothyroidism, unspecified type    Vitamin D3 1000 intl units oral capsule  -- 1 cap(s) by mouth once a day  -- Indication: For supplement

## 2017-07-24 NOTE — DISCHARGE NOTE ADULT - MEDICATION SUMMARY - MEDICATIONS TO CHANGE
I will SWITCH the dose or number of times a day I take the medications listed below when I get home from the hospital:    Lantus Solostar Pen 100 units/mL subcutaneous solution  -- 24 unit(s) in am and 26 u in pm subcutaneous 2 times a day

## 2017-07-24 NOTE — PROGRESS NOTE ADULT - PROBLEM SELECTOR PLAN 3
BS in 130-150s On Lantus 24 units plus SSS  -hbA1c 6.7.Patient is 89 years old with target A1c > 7, would not give BID lantus for concern for hypoglycemia.

## 2017-07-24 NOTE — DISCHARGE NOTE ADULT - PLAN OF CARE
resolved Follow up with PCP within 1 week, repeat BMP to monitor kidney function. stable Acute kidney injury resolved. Continue with labetalol, clonidine patch and benazepril. Follow up with PCP within 1 week for blood pressure check. A1c- 6.7. Continue with diabetic diet. Continue with metformin. Only inject Lantus 24 units subcutaneously in the morning, STOP EVENING DOSE OF LANTUS. Monitor fingersticks with meals and at bedtime. Follow up with PCP/endocrinologist within 1 week to adjust Lantus as needed. continue with synthroid

## 2017-07-24 NOTE — DISCHARGE NOTE ADULT - HOSPITAL COURSE
Patient is a 89 year old woman with HTN, HLD, GERD, gout, hypothyroidism, recent right hip hemiarthroplasty, type 2 diabetes who comes to ED after discharge this Thursday 7/20 where she was admitted for hypertensive urgency with complaint of oliguria x 1 day. She went home post discharge and urinated a small amount one time. She also had a bowel movement but had no further urination. She endorses feeling urge to urinate/pressure however unable to do so. She states that all these problems started to arise after her hip surgery. She denies fever, chills, dysuria, hematuria. She states she did have some back pain but wore a back brace and the pain resolved prior to her coming to the hospital. She has not missed her antihypertensive medications. She did check her blood pressure this morning and found it to be 90/43. Yesterday her blood pressures were "normal" in the 160s.     In ED: VS: T97.7F, HR 71, /70, RR 18, SpO2 98% on RA. Initial BP on ED triage was 142/67 Patient in ED received. 2L NS bolus. Mckeon placed with 100 cc urine drained.     Last admission patient had hypertensive urgency with SBP > 200s with no signs of end organ damage. Patient had renal US with doppler to assess for renal vein thrombosis however study was limited due to body habitus and bowel gas however no obvious evidence of thrombosis noted in study report. Patient also had renal US completed this admission which showed no hydronephrosis or calculus and bladder collapsed around mckeon.     Patient had admission 4/23/17 for FLETCHER in setting of using lasix, aldactone, HCTZ, eplerenone.   Acute kidney injury likely 2/2 dehydration? lasix  vs obstrcutive with FENA of 1.02% patient received 2L NS in ED, Cr improved and stable.Mckeon placed in the ED was dced  Pt voiding well. Monitor for signs of urinary retention.     Hypertension.  SBP ranged in 150s pt was continued on home meds labetalol, clonidine and ACEI.   patient's antihypertensive regimen includes benazepril (to be given if SBP > 150), clonidine patch, labetalol. Patient was not discharged on any diuretics. Patient had previously been on lasix, aldactone, HCTZ, eplerenone and was admitted 4/23        Type 2 diabetes mellitus.   hbA1c 6.7. Though pts home regimen was Lantus BID,  she reports she doesn't take pm lantus.Inocente continue Lantus 24 units once daily and monitor FS.Patient is 89 years old with target A1c > 7, would not give BID lantus for concern for hypoglycemia.     Hypothyroidism, unspecified type. -c/w synthroid.      GERD (gastroesophageal reflux disease). -c/w protonix.     Gout. Plan: -c/w allopurinol.  Concerned about patient's ability to manage medications vs unclear understanding of which medications to take given that she has had now three admissions recently related to antihypertensive medications/diuretics  Team spoke w/ patient's daughter Luz Patton who reports patient is quite good w/ medications. Asking us to monitor her for an additional day.  Ensure medications reviewed in detail prior to d/c. Denies any anticholinergic medications at home.-     Pt refused PT treatment. Medically optimized for DC

## 2017-07-24 NOTE — DISCHARGE NOTE ADULT - CARE PLAN
Principal Discharge DX:	Acute kidney injury  Goal:	resolved  Instructions for follow-up, activity and diet:	Follow up with PCP within 1 week, repeat BMP to monitor kidney function.  Secondary Diagnosis:	Hypertension  Goal:	stable  Instructions for follow-up, activity and diet:	Acute kidney injury resolved. Continue with labetalol, clonidine patch and benazepril. Follow up with PCP within 1 week for blood pressure check.  Secondary Diagnosis:	Type 2 diabetes mellitus  Instructions for follow-up, activity and diet:	A1c- 6.7. Continue with diabetic diet. Continue with metformin. Only inject Lantus 24 units subcutaneously in the morning, STOP EVENING DOSE OF LANTUS. Monitor fingersticks with meals and at bedtime. Follow up with PCP/endocrinologist within 1 week to adjust Lantus as needed.  Secondary Diagnosis:	Hypothyroidism, unspecified type  Instructions for follow-up, activity and diet:	continue with synthroid

## 2017-07-24 NOTE — PROGRESS NOTE ADULT - SUBJECTIVE AND OBJECTIVE BOX
Patient is a 89y old  Female who presents with a chief complaint of Decrease urine output and elevated BP (22 Jul 2017 00:24)      SUBJECTIVE / OVERNIGHT EVENTS: No overnight events. Voiding well. No headaches, no chest pain. Eating and drinking well.       MEDICATIONS  (STANDING):  cloNIDine Patch 0.3 mG/24Hr(s) 1 patch Topical <User Schedule>  amitriptyline 25 milliGRAM(s) Oral at bedtime  insulin glargine Injectable (LANTUS) 24 Unit(s) SubCutaneous every morning  insulin lispro (HumaLOG) corrective regimen sliding scale   SubCutaneous three times a day before meals  insulin lispro (HumaLOG) corrective regimen sliding scale   SubCutaneous at bedtime  dextrose 5%. 1000 milliLiter(s) (50 mL/Hr) IV Continuous <Continuous>  dextrose 50% Injectable 12.5 Gram(s) IV Push once  dextrose 50% Injectable 25 Gram(s) IV Push once  dextrose 50% Injectable 25 Gram(s) IV Push once  allopurinol 200 milliGRAM(s) Oral daily  atorvastatin 40 milliGRAM(s) Oral at bedtime  labetalol 200 milliGRAM(s) Oral three times a day  nystatin Powder 1 Application(s) Topical two times a day  senna 2 Tablet(s) Oral at bedtime  pantoprazole    Tablet 40 milliGRAM(s) Oral before breakfast  levothyroxine 50 MICROGram(s) Oral daily  cholecalciferol 1000 Unit(s) Oral daily  heparin  Injectable 5000 Unit(s) SubCutaneous every 8 hours  lisinopril 20 milliGRAM(s) Oral daily    MEDICATIONS  (PRN):  dextrose Gel 1 Dose(s) Oral once PRN Blood Glucose LESS THAN 70 milliGRAM(s)/deciliter  glucagon  Injectable 1 milliGRAM(s) IntraMuscular once PRN Glucose LESS THAN 70 milligrams/deciliter  ALBUTerol    90 MICROgram(s) HFA Inhaler 1 Puff(s) Inhalation every 4 hours PRN Shortness of Breath and/or Wheezing      CAPILLARY BLOOD GLUCOSE  170 (23 Jul 2017 12:08)  107 (23 Jul 2017 08:31)  142 (22 Jul 2017 22:04)  117 (22 Jul 2017 17:17)        I&O's Summary    22 Jul 2017 07:01  -  23 Jul 2017 07:00  --------------------------------------------------------  IN: 0 mL / OUT: 3150 mL / NET: -3150 mL      Vital Signs Last 24 Hrs  T(C): 36.5 (24 Jul 2017 05:49), Max: 37.2 (23 Jul 2017 15:25)  T(F): 97.7 (24 Jul 2017 05:49), Max: 98.9 (23 Jul 2017 15:25)  HR: 60 (24 Jul 2017 05:49) (60 - 73)  BP: 151/66 (24 Jul 2017 05:49) (150/80 - 180/80)  BP(mean): --  RR: 18 (24 Jul 2017 05:49) (18 - 18)  SpO2: 99% (24 Jul 2017 05:49) (96% - 99%)    PHYSICAL EXAM:  GENERAL: Well-developed,  well nourished, No acute distress  HEENT: MMM  NECK: Supple, No JVD, no LAD  CHEST/LUNG: Clear to auscultation bilaterally; no wheezes, rhonchi, or rales  HEART: S1/S2, Regular rate and rhythm;   ABDOMEN: Nondistended, NABS, soft,  nontender,   EXTREMITIES:  2+ Peripheral Pulses, no clubbing, cyanosis, or edema  PSYCH: AAOx3, normal affect      LABS: reviewed                              11.1   7.83  )-----------( 239      ( 24 Jul 2017 05:30 )             35.9   07-24    142  |  107  |  10  ----------------------------<  139<H>  4.3   |  23  |  0.88    Ca    8.9      24 Jul 2017 05:30    TPro  6.1  /  Alb  3.3  /  TBili  0.6  /  DBili  x   /  AST  26  /  ALT  20  /  AlkPhos  78  07-24          RADIOLOGY & ADDITIONAL TESTS:    Imaging Personally Reviewed:    Consultant(s) Notes Reviewed:      Care Discussed with Consultants/Other Providers: Patient is a 89y old  Female who presents with a chief complaint of Decrease urine output and elevated BP (22 Jul 2017 00:24)      SUBJECTIVE / OVERNIGHT EVENTS: No overnight events. Pt feels well. voiding well.  Eating and drinking well. Denies any new complaints      MEDICATIONS  (STANDING):  cloNIDine Patch 0.3 mG/24Hr(s) 1 patch Topical <User Schedule>  amitriptyline 25 milliGRAM(s) Oral at bedtime  insulin glargine Injectable (LANTUS) 24 Unit(s) SubCutaneous every morning  insulin lispro (HumaLOG) corrective regimen sliding scale   SubCutaneous three times a day before meals  insulin lispro (HumaLOG) corrective regimen sliding scale   SubCutaneous at bedtime  dextrose 5%. 1000 milliLiter(s) (50 mL/Hr) IV Continuous <Continuous>  dextrose 50% Injectable 12.5 Gram(s) IV Push once  dextrose 50% Injectable 25 Gram(s) IV Push once  dextrose 50% Injectable 25 Gram(s) IV Push once  allopurinol 200 milliGRAM(s) Oral daily  atorvastatin 40 milliGRAM(s) Oral at bedtime  labetalol 200 milliGRAM(s) Oral three times a day  nystatin Powder 1 Application(s) Topical two times a day  senna 2 Tablet(s) Oral at bedtime  pantoprazole    Tablet 40 milliGRAM(s) Oral before breakfast  levothyroxine 50 MICROGram(s) Oral daily  cholecalciferol 1000 Unit(s) Oral daily  heparin  Injectable 5000 Unit(s) SubCutaneous every 8 hours  lisinopril 20 milliGRAM(s) Oral daily    MEDICATIONS  (PRN):  dextrose Gel 1 Dose(s) Oral once PRN Blood Glucose LESS THAN 70 milliGRAM(s)/deciliter  glucagon  Injectable 1 milliGRAM(s) IntraMuscular once PRN Glucose LESS THAN 70 milligrams/deciliter  ALBUTerol    90 MICROgram(s) HFA Inhaler 1 Puff(s) Inhalation every 4 hours PRN Shortness of Breath and/or Wheezing      CAPILLARY BLOOD GLUCOSE  170 (23 Jul 2017 12:08)  107 (23 Jul 2017 08:31)  142 (22 Jul 2017 22:04)  117 (22 Jul 2017 17:17)        I&O's Summary    22 Jul 2017 07:01  -  23 Jul 2017 07:00  --------------------------------------------------------  IN: 0 mL / OUT: 3150 mL / NET: -3150 mL      Vital Signs Last 24 Hrs  T(C): 36.5 (24 Jul 2017 05:49), Max: 37.2 (23 Jul 2017 15:25)  T(F): 97.7 (24 Jul 2017 05:49), Max: 98.9 (23 Jul 2017 15:25)  HR: 60 (24 Jul 2017 05:49) (60 - 73)  BP: 151/66 (24 Jul 2017 05:49) (150/80 - 180/80)  BP(mean): --  RR: 18 (24 Jul 2017 05:49) (18 - 18)  SpO2: 99% (24 Jul 2017 05:49) (96% - 99%)    PHYSICAL EXAM:  GENERAL: Well-developed,  well nourished, No acute distress  HEENT: MMM  NECK: Supple, No JVD, no LAD  CHEST/LUNG: Clear to auscultation bilaterally; no wheezes, rhonchi, or rales  HEART: S1/S2, Regular rate and rhythm;   ABDOMEN: Nondistended, NABS, soft,  nontender,   EXTREMITIES:  2+ Peripheral Pulses, no clubbing, cyanosis, or edema  PSYCH: AAOx3, normal affect      LABS: reviewed                              11.1   7.83  )-----------( 239      ( 24 Jul 2017 05:30 )             35.9   07-24    142  |  107  |  10  ----------------------------<  139<H>  4.3   |  23  |  0.88    Ca    8.9      24 Jul 2017 05:30    TPro  6.1  /  Alb  3.3  /  TBili  0.6  /  DBili  x   /  AST  26  /  ALT  20  /  AlkPhos  78  07-24          RADIOLOGY & ADDITIONAL TESTS:    Imaging Personally Reviewed:    Consultant(s) Notes Reviewed:      Care Discussed with Consultants/Other Providers:

## 2017-07-24 NOTE — DISCHARGE NOTE ADULT - PATIENT PORTAL LINK FT
“You can access the FollowHealth Patient Portal, offered by [facility], by registering with the following website: [email or web address]”

## 2017-07-28 ENCOUNTER — APPOINTMENT (OUTPATIENT)
Dept: NEPHROLOGY | Facility: CLINIC | Age: 82
End: 2017-07-28
Payer: MEDICARE

## 2017-07-28 VITALS — DIASTOLIC BLOOD PRESSURE: 80 MMHG | SYSTOLIC BLOOD PRESSURE: 134 MMHG

## 2017-07-28 VITALS
HEIGHT: 63 IN | WEIGHT: 185 LBS | DIASTOLIC BLOOD PRESSURE: 69 MMHG | SYSTOLIC BLOOD PRESSURE: 140 MMHG | HEART RATE: 63 BPM | OXYGEN SATURATION: 95 % | BODY MASS INDEX: 32.78 KG/M2

## 2017-07-28 DIAGNOSIS — Z87.19 PERSONAL HISTORY OF OTHER DISEASES OF THE DIGESTIVE SYSTEM: ICD-10-CM

## 2017-07-28 PROCEDURE — 99215 OFFICE O/P EST HI 40 MIN: CPT

## 2017-09-25 ENCOUNTER — APPOINTMENT (OUTPATIENT)
Dept: NEUROLOGY | Facility: CLINIC | Age: 82
End: 2017-09-25

## 2017-10-02 ENCOUNTER — APPOINTMENT (OUTPATIENT)
Dept: NEUROLOGY | Facility: CLINIC | Age: 82
End: 2017-10-02

## 2017-10-05 ENCOUNTER — APPOINTMENT (OUTPATIENT)
Dept: NEUROLOGY | Facility: CLINIC | Age: 82
End: 2017-10-05

## 2017-10-25 ENCOUNTER — APPOINTMENT (OUTPATIENT)
Dept: NEUROLOGY | Facility: CLINIC | Age: 82
End: 2017-10-25
Payer: MEDICARE

## 2017-10-25 VITALS
WEIGHT: 185 LBS | HEART RATE: 66 BPM | SYSTOLIC BLOOD PRESSURE: 108 MMHG | HEIGHT: 63 IN | DIASTOLIC BLOOD PRESSURE: 58 MMHG | BODY MASS INDEX: 32.78 KG/M2

## 2017-10-25 DIAGNOSIS — Z86.39 PERSONAL HISTORY OF OTHER ENDOCRINE, NUTRITIONAL AND METABOLIC DISEASE: ICD-10-CM

## 2017-10-25 DIAGNOSIS — Z86.73 PERSONAL HISTORY OF TRANSIENT ISCHEMIC ATTACK (TIA), AND CEREBRAL INFARCTION W/OUT RESIDUAL DEFICITS: ICD-10-CM

## 2017-10-25 DIAGNOSIS — Z82.49 FAMILY HISTORY OF ISCHEMIC HEART DISEASE AND OTHER DISEASES OF THE CIRCULATORY SYSTEM: ICD-10-CM

## 2017-10-25 PROCEDURE — 99205 OFFICE O/P NEW HI 60 MIN: CPT

## 2017-11-03 ENCOUNTER — APPOINTMENT (OUTPATIENT)
Dept: NEPHROLOGY | Facility: CLINIC | Age: 82
End: 2017-11-03
Payer: MEDICARE

## 2017-11-03 VITALS
HEART RATE: 63 BPM | SYSTOLIC BLOOD PRESSURE: 151 MMHG | WEIGHT: 189.59 LBS | DIASTOLIC BLOOD PRESSURE: 62 MMHG | BODY MASS INDEX: 33.59 KG/M2 | HEIGHT: 63 IN | OXYGEN SATURATION: 98 %

## 2017-11-03 VITALS — SYSTOLIC BLOOD PRESSURE: 110 MMHG | DIASTOLIC BLOOD PRESSURE: 50 MMHG

## 2017-11-03 VITALS — DIASTOLIC BLOOD PRESSURE: 50 MMHG | SYSTOLIC BLOOD PRESSURE: 120 MMHG

## 2017-11-03 PROCEDURE — 99214 OFFICE O/P EST MOD 30 MIN: CPT

## 2017-11-06 LAB
ALBUMIN SERPL ELPH-MCNC: 3.3 G/DL
ANION GAP SERPL CALC-SCNC: 14 MMOL/L
BUN SERPL-MCNC: 18 MG/DL
CALCIUM SERPL-MCNC: 8.7 MG/DL
CHLORIDE SERPL-SCNC: 106 MMOL/L
CO2 SERPL-SCNC: 22 MMOL/L
CREAT SERPL-MCNC: 1.19 MG/DL
GLUCOSE SERPL-MCNC: 90 MG/DL
PHOSPHATE SERPL-MCNC: 2.5 MG/DL
POTASSIUM SERPL-SCNC: 4.9 MMOL/L
SODIUM SERPL-SCNC: 142 MMOL/L

## 2018-01-12 ENCOUNTER — APPOINTMENT (OUTPATIENT)
Dept: NEUROLOGY | Facility: CLINIC | Age: 83
End: 2018-01-12

## 2018-02-03 NOTE — ED ADULT TRIAGE NOTE - CHIEF COMPLAINT QUOTE
p/t c/o of difficulty urinating for past 24 hrs p/t c/o of swelling to ble p/t denies any chest pain denies sob @ present
verbal instruction

## 2018-02-15 ENCOUNTER — APPOINTMENT (OUTPATIENT)
Dept: NEUROLOGY | Facility: CLINIC | Age: 83
End: 2018-02-15
Payer: MEDICARE

## 2018-02-15 VITALS — DIASTOLIC BLOOD PRESSURE: 69 MMHG | HEIGHT: 63 IN | SYSTOLIC BLOOD PRESSURE: 160 MMHG | HEART RATE: 60 BPM

## 2018-02-15 PROCEDURE — 93888 INTRACRANIAL LIMITED STUDY: CPT

## 2018-02-15 PROCEDURE — 93880 EXTRACRANIAL BILAT STUDY: CPT

## 2018-02-15 PROCEDURE — 99215 OFFICE O/P EST HI 40 MIN: CPT

## 2018-05-04 ENCOUNTER — APPOINTMENT (OUTPATIENT)
Dept: NEPHROLOGY | Facility: CLINIC | Age: 83
End: 2018-05-04

## 2018-08-17 NOTE — H&P ADULT. - DOES THIS PATIENT HAVE A HISTORY OF OR HAS BEEN DX WITH HEART FAILURE?
[facility] Transitions Initial Follow Up Call    Call within 2 business days of discharge: Yes    Patient: Kevyn Le Patient : 2017   MRN: <[id number]>  Reason for Admission: There are no discharge diagnoses documented for the most recent discharge. Discharge Date: 8/15/18 RARS: Readmission Risk Score: 7     Spoke with: Mother April    Facility: [address] [place] [address] 24 Hour Call    Care Transitions Interventions       CTC spoke briefly to patient's mother April who stated she is in car and on the way to PCP Dr Janette Noriega office because PCP wanted to see patient today for f/u appointment. Mother requested CTC call back another time. Agreed to have CTC call tomorrow for follow up transitions call. Bard Tor RN BSN   Care Transitions Coordinator  [phone]     Follow Up  No future appointments.     Bard Tor RN
[facility] [address] Transitions Initial Follow Up Call    Call within 2 business days of discharge: Yes    Patient: Sully Mcmullen Patient : 2017   MRN: <[id number]>  Reason for Admission: There are no discharge diagnoses documented for the most recent discharge. Discharge Date: 8/15/18 RARS: Readmission Risk Score: 7     Spoke with: Mother April    Facility: [facility]  Non-face-to-face services provided:  Obtained and reviewed discharge summary and/or continuity of care documents  Assessment and support for treatment adherence and medication management-[id number] completed    Care Transitions 24 Hour Call    Care Transitions Interventions       CTC spoke to pt's mother April who stated pt is doing well at home since discharge on 8/15 from [address] she took pt to [facility] for upper respiratory symptoms of cough and poor appetite. Stated pt was transferred to [address] for apneic episode at [facility]. Mother stated she was near pt when episode occurred but could not see her face. Stated she saw pt's stomach and chest continue to rise and denies color change. Stated she thinks pt was simply sleeping because she was exhausted from no nap. Stated she has not witnessed any apneic episodes at home in past or since home from hospital. Stated her mother is watching pt today and said her appetite is improving and she took a nap. Stated she has been monitoring pt's temp and denies fever. Stated cough has improved. Stated pt is getting regular nebulizer treatments and mother has no concerns at this time. Requested assistance scheduling appt with Dr Galdino Baires, neurology for f/u. [facility] placed call and left  requesting call back for appt. Pt has f/u appt with Dr Amina Castellanos scheduled on 18. Mother agreed to f/u transitions calls.     Michelle Miller, RN BSN   Care Transitions Coordinator  [phone]     Follow Up  Future Appointments  Date Time Provider Aubrie Dong   2018 3:00 PM Amy Gomez MD [address]
no

## 2018-10-14 ENCOUNTER — EMERGENCY (EMERGENCY)
Facility: HOSPITAL | Age: 83
LOS: 0 days | Discharge: HOME | End: 2018-10-15
Attending: EMERGENCY MEDICINE
Payer: MEDICARE

## 2018-10-14 VITALS
WEIGHT: 195.11 LBS | OXYGEN SATURATION: 100 % | TEMPERATURE: 98 F | DIASTOLIC BLOOD PRESSURE: 64 MMHG | HEART RATE: 72 BPM | RESPIRATION RATE: 18 BRPM | SYSTOLIC BLOOD PRESSURE: 144 MMHG

## 2018-10-14 DIAGNOSIS — M16.31 UNILATERAL OSTEOARTHRITIS RESULTING FROM HIP DYSPLASIA, RIGHT HIP: ICD-10-CM

## 2018-10-14 DIAGNOSIS — E11.9 TYPE 2 DIABETES MELLITUS WITHOUT COMPLICATIONS: ICD-10-CM

## 2018-10-14 DIAGNOSIS — M10.9 GOUT, UNSPECIFIED: ICD-10-CM

## 2018-10-14 DIAGNOSIS — R33.9 RETENTION OF URINE, UNSPECIFIED: ICD-10-CM

## 2018-10-14 DIAGNOSIS — K42.9 UMBILICAL HERNIA WITHOUT OBSTRUCTION OR GANGRENE: Chronic | ICD-10-CM

## 2018-10-14 DIAGNOSIS — K21.9 GASTRO-ESOPHAGEAL REFLUX DISEASE WITHOUT ESOPHAGITIS: ICD-10-CM

## 2018-10-14 DIAGNOSIS — I10 ESSENTIAL (PRIMARY) HYPERTENSION: ICD-10-CM

## 2018-10-14 DIAGNOSIS — Z96.641 PRESENCE OF RIGHT ARTIFICIAL HIP JOINT: ICD-10-CM

## 2018-10-14 DIAGNOSIS — Z79.899 OTHER LONG TERM (CURRENT) DRUG THERAPY: ICD-10-CM

## 2018-10-14 DIAGNOSIS — Z79.4 LONG TERM (CURRENT) USE OF INSULIN: ICD-10-CM

## 2018-10-14 DIAGNOSIS — E78.5 HYPERLIPIDEMIA, UNSPECIFIED: ICD-10-CM

## 2018-10-14 DIAGNOSIS — M16.11 UNILATERAL PRIMARY OSTEOARTHRITIS, RIGHT HIP: Chronic | ICD-10-CM

## 2018-10-14 DIAGNOSIS — E03.9 HYPOTHYROIDISM, UNSPECIFIED: ICD-10-CM

## 2018-10-14 PROCEDURE — 99284 EMERGENCY DEPT VISIT MOD MDM: CPT

## 2018-10-14 RX ORDER — SODIUM CHLORIDE 9 MG/ML
3 INJECTION INTRAMUSCULAR; INTRAVENOUS; SUBCUTANEOUS ONCE
Qty: 0 | Refills: 0 | Status: COMPLETED | OUTPATIENT
Start: 2018-10-14 | End: 2018-10-14

## 2018-10-14 NOTE — ED ADULT TRIAGE NOTE - CHIEF COMPLAINT QUOTE
pt BIB with c/o lower back pain, no urine output as per daughter, hx of ARF in the past, states a few nights ago she was urinating frequently, denies subjective fever at home

## 2018-10-14 NOTE — ED ADULT NURSE NOTE - NSIMPLEMENTINTERV_GEN_ALL_ED
Implemented All Fall with Harm Risk Interventions:  [place] to call system. Call bell, personal items and telephone within reach. Instruct patient to call for assistance. Room bathroom lighting operational. Non-slip footwear when patient is off stretcher. Physically safe environment: no spills, clutter or unnecessary equipment. Stretcher in lowest position, wheels locked, appropriate side rails in place. Provide visual cue, wrist band, yellow gown, etc. Monitor gait and stability. Monitor for mental status changes and reorient to person, place, and time. Review medications for side effects contributing to fall risk. Reinforce activity limits and safety measures with patient and family. Provide visual clues: red socks.

## 2018-10-14 NOTE — ED ADULT NURSE NOTE - PSH
Osteoarthritis of right hip, unspecified osteoarthritis type  partial Right Hip Replacement 2017  Paraumbilical hernia    S/P Tonsillectomy and Adenoidectomy    S/P Tonsillectomy and Adenoidectomy

## 2018-10-15 VITALS
OXYGEN SATURATION: 100 % | TEMPERATURE: 98 F | SYSTOLIC BLOOD PRESSURE: 140 MMHG | RESPIRATION RATE: 17 BRPM | DIASTOLIC BLOOD PRESSURE: 52 MMHG | HEART RATE: 65 BPM

## 2018-10-15 LAB
ALBUMIN SERPL ELPH-MCNC: 3.1 G/DL — LOW (ref 3.3–5)
ALP SERPL-CCNC: 69 U/L — SIGNIFICANT CHANGE UP (ref 40–120)
ALT FLD-CCNC: 20 U/L — SIGNIFICANT CHANGE UP (ref 12–78)
ANION GAP SERPL CALC-SCNC: 9 MMOL/L — SIGNIFICANT CHANGE UP (ref 5–17)
APPEARANCE UR: CLEAR — SIGNIFICANT CHANGE UP
APTT BLD: 30 SEC — SIGNIFICANT CHANGE UP (ref 27.5–37.4)
AST SERPL-CCNC: 23 U/L — SIGNIFICANT CHANGE UP (ref 15–37)
BASOPHILS # BLD AUTO: 0.06 K/UL — SIGNIFICANT CHANGE UP (ref 0–0.2)
BASOPHILS NFR BLD AUTO: 0.6 % — SIGNIFICANT CHANGE UP (ref 0–2)
BILIRUB SERPL-MCNC: 0.6 MG/DL — SIGNIFICANT CHANGE UP (ref 0.2–1.2)
BILIRUB UR-MCNC: NEGATIVE — SIGNIFICANT CHANGE UP
BUN SERPL-MCNC: 35 MG/DL — HIGH (ref 7–23)
CALCIUM SERPL-MCNC: 7.9 MG/DL — LOW (ref 8.5–10.1)
CHLORIDE SERPL-SCNC: 110 MMOL/L — HIGH (ref 96–108)
CO2 SERPL-SCNC: 23 MMOL/L — SIGNIFICANT CHANGE UP (ref 22–31)
COLOR SPEC: YELLOW — SIGNIFICANT CHANGE UP
CREAT SERPL-MCNC: 1.85 MG/DL — HIGH (ref 0.5–1.3)
DIFF PNL FLD: NEGATIVE — SIGNIFICANT CHANGE UP
EOSINOPHIL # BLD AUTO: 0.57 K/UL — HIGH (ref 0–0.5)
EOSINOPHIL NFR BLD AUTO: 5.4 % — SIGNIFICANT CHANGE UP (ref 0–6)
GLUCOSE SERPL-MCNC: 74 MG/DL — SIGNIFICANT CHANGE UP (ref 70–99)
GLUCOSE UR QL: NEGATIVE MG/DL — SIGNIFICANT CHANGE UP
HCT VFR BLD CALC: 32.5 % — LOW (ref 34.5–45)
HGB BLD-MCNC: 10.3 G/DL — LOW (ref 11.5–15.5)
IMM GRANULOCYTES NFR BLD AUTO: 0.3 % — SIGNIFICANT CHANGE UP (ref 0–1.5)
INR BLD: 1.08 RATIO — SIGNIFICANT CHANGE UP (ref 0.88–1.16)
KETONES UR-MCNC: NEGATIVE — SIGNIFICANT CHANGE UP
LACTATE SERPL-SCNC: 1.3 MMOL/L — SIGNIFICANT CHANGE UP (ref 0.7–2)
LEUKOCYTE ESTERASE UR-ACNC: NEGATIVE — SIGNIFICANT CHANGE UP
LIDOCAIN IGE QN: 76 U/L — SIGNIFICANT CHANGE UP (ref 73–393)
LYMPHOCYTES # BLD AUTO: 1.81 K/UL — SIGNIFICANT CHANGE UP (ref 1–3.3)
LYMPHOCYTES # BLD AUTO: 17.1 % — SIGNIFICANT CHANGE UP (ref 13–44)
MCHC RBC-ENTMCNC: 29.3 PG — SIGNIFICANT CHANGE UP (ref 27–34)
MCHC RBC-ENTMCNC: 31.7 GM/DL — LOW (ref 32–36)
MCV RBC AUTO: 92.6 FL — SIGNIFICANT CHANGE UP (ref 80–100)
MONOCYTES # BLD AUTO: 0.73 K/UL — SIGNIFICANT CHANGE UP (ref 0–0.9)
MONOCYTES NFR BLD AUTO: 6.9 % — SIGNIFICANT CHANGE UP (ref 2–14)
NEUTROPHILS # BLD AUTO: 7.36 K/UL — SIGNIFICANT CHANGE UP (ref 1.8–7.4)
NEUTROPHILS NFR BLD AUTO: 69.7 % — SIGNIFICANT CHANGE UP (ref 43–77)
NITRITE UR-MCNC: NEGATIVE — SIGNIFICANT CHANGE UP
NRBC # BLD: 0 /100 WBCS — SIGNIFICANT CHANGE UP (ref 0–0)
PH UR: 5 — SIGNIFICANT CHANGE UP (ref 5–8)
PLATELET # BLD AUTO: 272 K/UL — SIGNIFICANT CHANGE UP (ref 150–400)
POTASSIUM SERPL-MCNC: 4.4 MMOL/L — SIGNIFICANT CHANGE UP (ref 3.5–5.3)
POTASSIUM SERPL-SCNC: 4.4 MMOL/L — SIGNIFICANT CHANGE UP (ref 3.5–5.3)
PROT SERPL-MCNC: 6.6 GM/DL — SIGNIFICANT CHANGE UP (ref 6–8.3)
PROT UR-MCNC: NEGATIVE MG/DL — SIGNIFICANT CHANGE UP
PROTHROM AB SERPL-ACNC: 11.8 SEC — SIGNIFICANT CHANGE UP (ref 9.8–12.7)
RBC # BLD: 3.51 M/UL — LOW (ref 3.8–5.2)
RBC # FLD: 15.7 % — HIGH (ref 10.3–14.5)
SODIUM SERPL-SCNC: 142 MMOL/L — SIGNIFICANT CHANGE UP (ref 135–145)
SP GR SPEC: 1.01 — SIGNIFICANT CHANGE UP (ref 1.01–1.02)
UROBILINOGEN FLD QL: NEGATIVE MG/DL — SIGNIFICANT CHANGE UP
WBC # BLD: 10.56 K/UL — HIGH (ref 3.8–10.5)
WBC # FLD AUTO: 10.56 K/UL — HIGH (ref 3.8–10.5)

## 2018-10-15 PROCEDURE — 74176 CT ABD & PELVIS W/O CONTRAST: CPT | Mod: 26

## 2018-10-15 RX ORDER — CEFPODOXIME PROXETIL 100 MG
1 TABLET ORAL
Qty: 14 | Refills: 0
Start: 2018-10-15 | End: 2018-10-21

## 2018-10-15 RX ORDER — SODIUM CHLORIDE 9 MG/ML
1000 INJECTION INTRAMUSCULAR; INTRAVENOUS; SUBCUTANEOUS ONCE
Qty: 0 | Refills: 0 | Status: COMPLETED | OUTPATIENT
Start: 2018-10-15 | End: 2018-10-15

## 2018-10-15 RX ORDER — CEFTRIAXONE 500 MG/1
1 INJECTION, POWDER, FOR SOLUTION INTRAMUSCULAR; INTRAVENOUS ONCE
Qty: 0 | Refills: 0 | Status: COMPLETED | OUTPATIENT
Start: 2018-10-15 | End: 2018-10-15

## 2018-10-15 RX ADMIN — SODIUM CHLORIDE 3 MILLILITER(S): 9 INJECTION INTRAMUSCULAR; INTRAVENOUS; SUBCUTANEOUS at 00:03

## 2018-10-15 RX ADMIN — SODIUM CHLORIDE 2000 MILLILITER(S): 9 INJECTION INTRAMUSCULAR; INTRAVENOUS; SUBCUTANEOUS at 04:00

## 2018-10-15 RX ADMIN — CEFTRIAXONE 100 GRAM(S): 500 INJECTION, POWDER, FOR SOLUTION INTRAMUSCULAR; INTRAVENOUS at 03:59

## 2018-10-15 NOTE — ED PROVIDER NOTE - OBJECTIVE STATEMENT
89yoF; with pmh signif for DM, HTN, HLD, GERD; now p/w urinary retention x1day. states she has small amounts of dripping urine.  no good uop.  c/o bilateral flank pain. denies n/v/d. denies abd pain. denies dysuria, hematuria. c/o frequency x2 days.  denies cp/sob/palp. denies headache.  PMH: DM, HTN, HLD, GERD  SOCIAL: No tobacco/illicit substance use/socialEtOH

## 2018-10-15 NOTE — ED PROVIDER NOTE - NS ED ROS FT
Constitutional: (-) fever  (-)chills  (-)sweats  Eyes/ENT: (-) blurry vision, (-) epistaxis  (-)rhinorrhea   (-) sore throat    Cardiovascular: (-) chest pain, (-) palpitations (-) edema   Respiratory: (-) cough, (-) shortness of breath   Gastrointestinal: (-)nausea  (-)vomiting, (-) diarrhea  (-) abdominal pain   :  (-)dysuria, (+)frequency, (-)urgency, (-)hematuria  Musculoskeletal: (-) neck pain, (-) back pain, (-) joint pain  Integumentary: (-) rash, (-) edema  Neurological: (-) headache, (-) altered mental status  (-)LOC

## 2018-10-16 ENCOUNTER — APPOINTMENT (OUTPATIENT)
Dept: UROLOGY | Facility: CLINIC | Age: 83
End: 2018-10-16
Payer: MEDICARE

## 2018-10-16 DIAGNOSIS — Z86.39 PERSONAL HISTORY OF OTHER ENDOCRINE, NUTRITIONAL AND METABOLIC DISEASE: ICD-10-CM

## 2018-10-16 LAB
CULTURE RESULTS: NO GROWTH — SIGNIFICANT CHANGE UP
SPECIMEN SOURCE: SIGNIFICANT CHANGE UP

## 2018-10-16 PROCEDURE — 99204 OFFICE O/P NEW MOD 45 MIN: CPT

## 2018-10-16 RX ORDER — CHROMIUM 200 MCG
1000 TABLET ORAL
Refills: 0 | Status: COMPLETED | COMMUNITY
Start: 2017-07-28 | End: 2018-10-16

## 2018-10-16 RX ORDER — CLONIDINE 0.3 MG/24H
0.3 PATCH, EXTENDED RELEASE TRANSDERMAL
Qty: 14 | Refills: 3 | Status: COMPLETED | COMMUNITY
Start: 2017-07-28 | End: 2018-10-16

## 2018-10-16 RX ORDER — ALLOPURINOL 100 MG/1
100 TABLET ORAL DAILY
Refills: 0 | Status: COMPLETED | COMMUNITY
Start: 2017-07-28 | End: 2018-10-16

## 2018-10-16 RX ORDER — LABETALOL HYDROCHLORIDE 200 MG/1
200 TABLET, FILM COATED ORAL
Qty: 270 | Refills: 3 | Status: COMPLETED | COMMUNITY
Start: 2017-07-28 | End: 2018-10-16

## 2018-10-16 RX ORDER — BENAZEPRIL HYDROCHLORIDE 20 MG/1
20 TABLET, FILM COATED ORAL DAILY
Refills: 0 | Status: COMPLETED | COMMUNITY
Start: 2017-07-28 | End: 2018-10-16

## 2018-10-16 RX ORDER — CLOPIDOGREL BISULFATE 75 MG/1
75 TABLET, FILM COATED ORAL DAILY
Refills: 0 | Status: COMPLETED | COMMUNITY
End: 2018-10-16

## 2018-10-16 RX ORDER — OMEPRAZOLE 20 MG/1
20 CAPSULE, DELAYED RELEASE ORAL DAILY
Refills: 0 | Status: COMPLETED | COMMUNITY
Start: 2017-07-28 | End: 2018-10-16

## 2018-10-16 RX ORDER — METFORMIN HYDROCHLORIDE 500 MG/1
500 TABLET, COATED ORAL
Qty: 60 | Refills: 3 | Status: COMPLETED | COMMUNITY
Start: 2017-07-28 | End: 2018-10-16

## 2018-10-16 RX ORDER — ATORVASTATIN CALCIUM 80 MG/1
80 TABLET, FILM COATED ORAL
Refills: 0 | Status: COMPLETED | COMMUNITY
Start: 2017-07-28 | End: 2018-10-16

## 2018-10-20 LAB
CULTURE RESULTS: SIGNIFICANT CHANGE UP
CULTURE RESULTS: SIGNIFICANT CHANGE UP
SPECIMEN SOURCE: SIGNIFICANT CHANGE UP
SPECIMEN SOURCE: SIGNIFICANT CHANGE UP

## 2018-10-26 ENCOUNTER — APPOINTMENT (OUTPATIENT)
Age: 83
End: 2018-10-26
Payer: MEDICARE

## 2018-10-26 PROCEDURE — 99213 OFFICE O/P EST LOW 20 MIN: CPT

## 2018-11-01 ENCOUNTER — RECORD ABSTRACTING (OUTPATIENT)
Age: 83
End: 2018-11-01

## 2018-11-01 DIAGNOSIS — Z87.440 PERSONAL HISTORY OF URINARY (TRACT) INFECTIONS: ICD-10-CM

## 2018-11-01 DIAGNOSIS — Z87.448 PERSONAL HISTORY OF OTHER DISEASES OF URINARY SYSTEM: ICD-10-CM

## 2018-11-01 DIAGNOSIS — M10.9 GOUT, UNSPECIFIED: ICD-10-CM

## 2018-11-01 DIAGNOSIS — Z87.39 PERSONAL HISTORY OF OTHER DISEASES OF THE MUSCULOSKELETAL SYSTEM AND CONNECTIVE TISSUE: ICD-10-CM

## 2018-11-01 DIAGNOSIS — L03.119 CELLULITIS OF UNSPECIFIED PART OF LIMB: ICD-10-CM

## 2018-11-01 DIAGNOSIS — T14.8XXA OTHER INJURY OF UNSPECIFIED BODY REGION, INITIAL ENCOUNTER: ICD-10-CM

## 2018-11-01 RX ORDER — UBIDECARENONE/VIT E ACET 100MG-5
1000 CAPSULE ORAL
Refills: 0 | Status: ACTIVE | COMMUNITY

## 2018-11-14 ENCOUNTER — RX RENEWAL (OUTPATIENT)
Age: 83
End: 2018-11-14

## 2018-11-19 ENCOUNTER — APPOINTMENT (OUTPATIENT)
Dept: INTERNAL MEDICINE | Facility: CLINIC | Age: 83
End: 2018-11-19
Payer: MEDICARE

## 2018-11-19 VITALS
WEIGHT: 189 LBS | BODY MASS INDEX: 33.49 KG/M2 | HEIGHT: 63 IN | HEART RATE: 78 BPM | DIASTOLIC BLOOD PRESSURE: 72 MMHG | SYSTOLIC BLOOD PRESSURE: 138 MMHG

## 2018-11-19 PROCEDURE — 99214 OFFICE O/P EST MOD 30 MIN: CPT

## 2018-11-19 NOTE — PHYSICAL EXAM
[No Respiratory Distress] : no respiratory distress  [Clear to Auscultation] : lungs were clear to auscultation bilaterally [No Accessory Muscle Use] : no accessory muscle use [Normal Percussion] : the chest was normal to percussion [Normal Rate] : normal rate  [Regular Rhythm] : with a regular rhythm [Normal S1, S2] : normal S1 and S2 [No Murmur] : no murmur heard [de-identified] : both legs are edematous left lower than the right

## 2018-11-19 NOTE — HISTORY OF PRESENT ILLNESS
[FreeTextEntry8] : Recent symptoms of respiratory infection.\par Seen at urgent care one week ago.\par Chest x-ray suggested possible early right lower lobe pneumonia.  Levaquin therapy initiated.\par Review of chest x-ray done at that time could not confirm the presence of a pneumonic process.\par She has not been having fever.\par There is chronic swelling of both lower extremities left worse than right with erythema distally in the left lower extremity not really cellulitic and perhaps related to her chronic pulmonary disease (asthma) and administration of amlodipine which she is taking for hypertension

## 2018-11-29 ENCOUNTER — MEDICATION RENEWAL (OUTPATIENT)
Age: 83
End: 2018-11-29

## 2018-11-30 ENCOUNTER — APPOINTMENT (OUTPATIENT)
Dept: INTERNAL MEDICINE | Facility: CLINIC | Age: 83
End: 2018-11-30
Payer: MEDICARE

## 2018-11-30 ENCOUNTER — MEDICATION RENEWAL (OUTPATIENT)
Age: 83
End: 2018-11-30

## 2018-11-30 ENCOUNTER — LABORATORY RESULT (OUTPATIENT)
Age: 83
End: 2018-11-30

## 2018-11-30 PROCEDURE — 36415 COLL VENOUS BLD VENIPUNCTURE: CPT

## 2018-12-01 LAB
ALBUMIN SERPL ELPH-MCNC: 3.5 G/DL
ALP BLD-CCNC: 74 U/L
ALT SERPL-CCNC: 11 U/L
ANION GAP SERPL CALC-SCNC: 12 MMOL/L
AST SERPL-CCNC: 14 U/L
BASOPHILS # BLD AUTO: 0.04 K/UL
BASOPHILS NFR BLD AUTO: 0.5 %
BILIRUB SERPL-MCNC: 0.4 MG/DL
BUN SERPL-MCNC: 30 MG/DL
CALCIUM SERPL-MCNC: 7.2 MG/DL
CHLORIDE SERPL-SCNC: 104 MMOL/L
CHOLEST SERPL-MCNC: 105 MG/DL
CHOLEST/HDLC SERPL: 3.1 RATIO
CK SERPL-CCNC: 79 U/L
CO2 SERPL-SCNC: 25 MMOL/L
CREAT SERPL-MCNC: 1.9 MG/DL
EOSINOPHIL # BLD AUTO: 0.5 K/UL
EOSINOPHIL NFR BLD AUTO: 6.7 %
GLUCOSE SERPL-MCNC: 142 MG/DL
HBA1C MFR BLD HPLC: 6.8 %
HCT VFR BLD CALC: 32.5 %
HDLC SERPL-MCNC: 34 MG/DL
HGB BLD-MCNC: 10.3 G/DL
IMM GRANULOCYTES NFR BLD AUTO: 0.3 %
LDLC SERPL CALC-MCNC: 57 MG/DL
LYMPHOCYTES # BLD AUTO: 1.54 K/UL
LYMPHOCYTES NFR BLD AUTO: 20.5 %
MAN DIFF?: NORMAL
MCHC RBC-ENTMCNC: 29.2 PG
MCHC RBC-ENTMCNC: 31.7 GM/DL
MCV RBC AUTO: 92.1 FL
MONOCYTES # BLD AUTO: 0.4 K/UL
MONOCYTES NFR BLD AUTO: 5.3 %
NEUTROPHILS # BLD AUTO: 5 K/UL
NEUTROPHILS NFR BLD AUTO: 66.7 %
PLATELET # BLD AUTO: 372 K/UL
POTASSIUM SERPL-SCNC: 4.9 MMOL/L
PROT SERPL-MCNC: 5.8 G/DL
RBC # BLD: 3.53 M/UL
RBC # FLD: 16 %
SODIUM SERPL-SCNC: 141 MMOL/L
T4 SERPL-MCNC: 6.6 UG/DL
TRIGL SERPL-MCNC: 71 MG/DL
TSH SERPL-ACNC: 3.92 UIU/ML
URATE SERPL-MCNC: 3.9 MG/DL
WBC # FLD AUTO: 7.5 K/UL

## 2018-12-06 ENCOUNTER — NON-APPOINTMENT (OUTPATIENT)
Age: 83
End: 2018-12-06

## 2018-12-06 ENCOUNTER — APPOINTMENT (OUTPATIENT)
Dept: INTERNAL MEDICINE | Facility: CLINIC | Age: 83
End: 2018-12-06
Payer: MEDICARE

## 2018-12-06 VITALS
BODY MASS INDEX: 34.2 KG/M2 | DIASTOLIC BLOOD PRESSURE: 58 MMHG | SYSTOLIC BLOOD PRESSURE: 126 MMHG | HEIGHT: 63 IN | HEART RATE: 62 BPM | WEIGHT: 193 LBS

## 2018-12-06 DIAGNOSIS — Z87.448 PERSONAL HISTORY OF OTHER DISEASES OF URINARY SYSTEM: ICD-10-CM

## 2018-12-06 DIAGNOSIS — I10 ESSENTIAL (PRIMARY) HYPERTENSION: ICD-10-CM

## 2018-12-06 PROCEDURE — 93000 ELECTROCARDIOGRAM COMPLETE: CPT

## 2018-12-06 PROCEDURE — 99214 OFFICE O/P EST MOD 30 MIN: CPT | Mod: 25

## 2018-12-06 PROCEDURE — 36415 COLL VENOUS BLD VENIPUNCTURE: CPT

## 2018-12-06 RX ORDER — CLONIDINE HYDROCHLORIDE 0.3 MG/1
TABLET ORAL
Refills: 0 | Status: DISCONTINUED | COMMUNITY
End: 2018-12-06

## 2018-12-06 RX ORDER — INSULIN GLARGINE 100 [IU]/ML
INJECTION, SOLUTION SUBCUTANEOUS
Refills: 0 | Status: DISCONTINUED | COMMUNITY
End: 2018-12-06

## 2018-12-06 RX ORDER — METFORMIN HYDROCHLORIDE 625 MG/1
TABLET ORAL
Refills: 0 | Status: DISCONTINUED | COMMUNITY
End: 2018-12-06

## 2018-12-06 NOTE — HISTORY OF PRESENT ILLNESS
[Patient was last seen on ___] : Patient was last seen on [unfilled] [Family Member] : family member [FreeTextEntry6] : She is here today for ongoing disease management of multiple ongoing problems.  She is accompanied by her daughter.\par \par She expresses concern about her blood count and blood her iron levels might be.\par \par She reports adherence to medications that she has been taking and does not describe symptoms suggesting side effects to those medications except for some very positional lightheadedness that she attributes to fluctuations in her blood pressure.\par \par She does not describe breathlessness no cough and has not had chest pain palpitation or difficulty with exertion in her daily activities.\par \par She describes some disuse of fine motions in her right hand episodically.\par \par She does not describe any symptoms suggestive of a transient cerebral ischemic episode or symptoms relating to her treated hypothyroidism brought to the hypertensive heart disease.

## 2018-12-06 NOTE — DATA REVIEWED
[FreeTextEntry1] : Blood tests done in anticipation of today's examination revealed a hemoglobin of about 10.5 g the creatinine is 1.9 and the LDL is not therapeutic range

## 2018-12-06 NOTE — RESULTS/DATA
[NSR] : normal sinus rhythm [1st Degree AV Block] : first degree heart block [RBBB] : right bundle branch block [No Interval Change] : no interval change

## 2018-12-06 NOTE — ASSESSMENT
[FreeTextEntry1] : The anemia is probably multifactorial but largest component is probably the renal insufficiency and acne itself is probably related to hypertensive heart disease and diabetes.\par \par The asthma is quite hesitant and the anemia is not symptomatic.\par \par The ophthalmologists will be seeing her for the central retinal artery occlusion as planned.\par \par Her hypothyroidism is not symptomatic in the swelling of her lower extremities is under treatment with low dose furosemide and further intervention is not indicated. \par \par  She will remain on the Plavix because of the transient ischemic attack and has not been any thrombocytopenia associated with that medication.

## 2018-12-06 NOTE — PHYSICAL EXAM
[No Respiratory Distress] : no respiratory distress  [Clear to Auscultation] : lungs were clear to auscultation bilaterally [No Accessory Muscle Use] : no accessory muscle use [Normal Percussion] : the chest was normal to percussion [Normal Rate] : normal rate  [Regular Rhythm] : with a regular rhythm [Normal S1, S2] : normal S1 and S2 [No Murmur] : no murmur heard [de-identified] : both legs are edematous left lower than the right

## 2019-01-13 ENCOUNTER — RX RENEWAL (OUTPATIENT)
Age: 84
End: 2019-01-13

## 2019-01-16 ENCOUNTER — RX RENEWAL (OUTPATIENT)
Age: 84
End: 2019-01-16

## 2019-01-22 ENCOUNTER — RX RENEWAL (OUTPATIENT)
Age: 84
End: 2019-01-22

## 2019-01-25 ENCOUNTER — APPOINTMENT (OUTPATIENT)
Age: 84
End: 2019-01-25

## 2019-02-04 ENCOUNTER — MEDICATION RENEWAL (OUTPATIENT)
Age: 84
End: 2019-02-04

## 2019-03-15 ENCOUNTER — RX RENEWAL (OUTPATIENT)
Age: 84
End: 2019-03-15

## 2019-03-15 RX ORDER — BLOOD SUGAR DIAGNOSTIC
STRIP MISCELLANEOUS
Qty: 2 | Refills: 3 | Status: ACTIVE | COMMUNITY
Start: 2019-03-15 | End: 1900-01-01

## 2019-03-18 ENCOUNTER — MEDICATION RENEWAL (OUTPATIENT)
Age: 84
End: 2019-03-18

## 2019-03-19 ENCOUNTER — MEDICATION RENEWAL (OUTPATIENT)
Age: 84
End: 2019-03-19

## 2019-04-19 ENCOUNTER — APPOINTMENT (OUTPATIENT)
Dept: INTERNAL MEDICINE | Facility: CLINIC | Age: 84
End: 2019-04-19
Payer: MEDICARE

## 2019-04-19 PROCEDURE — 36415 COLL VENOUS BLD VENIPUNCTURE: CPT

## 2019-04-20 LAB
ALBUMIN SERPL ELPH-MCNC: 4 G/DL
ALP BLD-CCNC: 80 U/L
ALT SERPL-CCNC: 16 U/L
ANION GAP SERPL CALC-SCNC: 14 MMOL/L
AST SERPL-CCNC: 17 U/L
BASOPHILS # BLD AUTO: 0.05 K/UL
BASOPHILS NFR BLD AUTO: 0.7 %
BILIRUB SERPL-MCNC: 0.5 MG/DL
BUN SERPL-MCNC: 31 MG/DL
CALCIUM SERPL-MCNC: 7.6 MG/DL
CHLORIDE SERPL-SCNC: 107 MMOL/L
CHOLEST SERPL-MCNC: 130 MG/DL
CHOLEST/HDLC SERPL: 2.8 RATIO
CK SERPL-CCNC: 107 U/L
CO2 SERPL-SCNC: 24 MMOL/L
CREAT SERPL-MCNC: 1.74 MG/DL
EOSINOPHIL # BLD AUTO: 0.63 K/UL
EOSINOPHIL NFR BLD AUTO: 9.2 %
ESTIMATED AVERAGE GLUCOSE: 148 MG/DL
FERRITIN SERPL-MCNC: 37 NG/ML
FOLATE SERPL-MCNC: >20 NG/ML
GGT SERPL-CCNC: 15 U/L
GLUCOSE SERPL-MCNC: 81 MG/DL
HBA1C MFR BLD HPLC: 6.8 %
HCT VFR BLD CALC: 33.7 %
HDLC SERPL-MCNC: 46 MG/DL
HGB BLD-MCNC: 10.6 G/DL
IMM GRANULOCYTES NFR BLD AUTO: 0.3 %
IRON SATN MFR SERPL: 19 %
IRON SERPL-MCNC: 59 UG/DL
LDLC SERPL CALC-MCNC: 70 MG/DL
LYMPHOCYTES # BLD AUTO: 1.46 K/UL
LYMPHOCYTES NFR BLD AUTO: 21.4 %
MAN DIFF?: NORMAL
MCHC RBC-ENTMCNC: 28.9 PG
MCHC RBC-ENTMCNC: 31.5 GM/DL
MCV RBC AUTO: 91.8 FL
MONOCYTES # BLD AUTO: 0.52 K/UL
MONOCYTES NFR BLD AUTO: 7.6 %
NEUTROPHILS # BLD AUTO: 4.14 K/UL
NEUTROPHILS NFR BLD AUTO: 60.8 %
PLATELET # BLD AUTO: 257 K/UL
POTASSIUM SERPL-SCNC: 5.2 MMOL/L
PROT SERPL-MCNC: 6.1 G/DL
RBC # BLD: 3.67 M/UL
RBC # BLD: 3.67 M/UL
RBC # FLD: 16.2 %
RETICS # AUTO: 1.4 %
RETICS AGGREG/RBC NFR: 51 K/UL
SODIUM SERPL-SCNC: 145 MMOL/L
T4 FREE SERPL-MCNC: 1.2 NG/DL
TIBC SERPL-MCNC: 315 UG/DL
TRIGL SERPL-MCNC: 72 MG/DL
TSH SERPL-ACNC: 5.45 UIU/ML
UIBC SERPL-MCNC: 256 UG/DL
VIT B12 SERPL-MCNC: 309 PG/ML
WBC # FLD AUTO: 6.82 K/UL

## 2019-04-25 ENCOUNTER — APPOINTMENT (OUTPATIENT)
Dept: INTERNAL MEDICINE | Facility: CLINIC | Age: 84
End: 2019-04-25
Payer: MEDICARE

## 2019-04-25 VITALS
HEART RATE: 64 BPM | SYSTOLIC BLOOD PRESSURE: 122 MMHG | HEIGHT: 63 IN | BODY MASS INDEX: 34.38 KG/M2 | DIASTOLIC BLOOD PRESSURE: 54 MMHG | WEIGHT: 194 LBS

## 2019-04-25 PROCEDURE — 36415 COLL VENOUS BLD VENIPUNCTURE: CPT

## 2019-04-25 PROCEDURE — 99214 OFFICE O/P EST MOD 30 MIN: CPT | Mod: 25

## 2019-04-25 RX ORDER — OSELTAMIVIR PHOSPHATE 75 MG/1
75 CAPSULE ORAL
Qty: 10 | Refills: 0 | Status: DISCONTINUED | COMMUNITY
Start: 2019-01-15 | End: 2019-04-25

## 2019-04-25 RX ORDER — CLONIDINE 0.3 MG/24H
0.3 PATCH, EXTENDED RELEASE TRANSDERMAL
Refills: 0 | Status: DISCONTINUED | COMMUNITY
End: 2019-04-25

## 2019-04-25 NOTE — HISTORY OF PRESENT ILLNESS
[FreeTextEntry1] : F/U  multiple problems [de-identified] : DM   taking meds   not symptomatic  BT: v reasonable for her    adherent to meds    no apparent se\par \par Hypothyroid   TSH higher   family notes she is forgetful   exam here in office confirms cognitive errors (minor)  ? relationship\par \par HBP   on lots of meds;  adherent;  no apparent side effects

## 2019-04-25 NOTE — PHYSICAL EXAM
[No Respiratory Distress] : no respiratory distress  [Clear to Auscultation] : lungs were clear to auscultation bilaterally [No Accessory Muscle Use] : no accessory muscle use [Normal Percussion] : the chest was normal to percussion [Normal Rate] : normal rate  [No Murmur] : no murmur heard [Regular Rhythm] : with a regular rhythm [Normal S1, S2] : normal S1 and S2 [de-identified] : both legs are edematous left lower than the right

## 2019-05-10 ENCOUNTER — APPOINTMENT (OUTPATIENT)
Dept: UROLOGY | Facility: CLINIC | Age: 84
End: 2019-05-10
Payer: MEDICARE

## 2019-05-10 DIAGNOSIS — Z87.898 PERSONAL HISTORY OF OTHER SPECIFIED CONDITIONS: ICD-10-CM

## 2019-05-10 PROCEDURE — 51798 US URINE CAPACITY MEASURE: CPT

## 2019-05-10 PROCEDURE — 99213 OFFICE O/P EST LOW 20 MIN: CPT | Mod: 25

## 2019-05-12 PROBLEM — Z87.898 HISTORY OF URINARY RETENTION: Status: RESOLVED | Noted: 2018-10-16 | Resolved: 2019-05-12

## 2019-05-12 NOTE — ASSESSMENT
[FreeTextEntry1] : 89 yo F with history of urinary retention, elevated Cr\par \par - Discussed possible etiologies for her renal insufficiency. Likely medical renal given review of prior imaging which showed atrophic kidneys\par - Renal US to rule out any evidence of obstruction\par - No evidence of urinary retention\par - Encouraged pt to follow-up with her nephrologist

## 2019-05-12 NOTE — PHYSICAL EXAM
[General Appearance - Well Developed] : well developed [Normal Appearance] : normal appearance [General Appearance - Well Nourished] : well nourished [Well Groomed] : well groomed [General Appearance - In No Acute Distress] : no acute distress [Abdomen Tenderness] : non-tender [Abdomen Soft] : soft [Costovertebral Angle Tenderness] : no ~M costovertebral angle tenderness [Urinary Bladder Findings] : the bladder was normal on palpation [Urethral Meatus] : normal urethra [Edema] : no peripheral edema [Respiration, Rhythm And Depth] : normal respiratory rhythm and effort [] : no respiratory distress [Exaggerated Use Of Accessory Muscles For Inspiration] : no accessory muscle use [Oriented To Time, Place, And Person] : oriented to person, place, and time [Not Anxious] : not anxious [Mood] : the mood was normal [Affect] : the affect was normal [FreeTextEntry1] : Uses walker [No Focal Deficits] : no focal deficits [No Palpable Adenopathy] : no palpable adenopathy

## 2019-05-12 NOTE — HISTORY OF PRESENT ILLNESS
[FreeTextEntry1] : 89 yo F presented to the ER 2 days ago in urinary retention. Had catheter placed and here today for trial of void. CT scan was overall unremarkable in the ER\par Last year, had urinary retention twice which resulted in elevated Cr. \par no history of UTI\par At baseline, has nocturia 3 times/ night\par Only voiding 2-3 times per day\par no straining, no weak stream\par no gross hematuria, no dysuria\par Drinks 1.5 small bottles of crystal light, 2 cups of coffee, 1 can of soda at night\par \par Interval history: Passed TOV at last visit. States that she is voiding her usually baseline for 2-3 times per day. Making more of an effort to drink water. \par \par Interval history: no urinary issues since last visit. Has been doing well. \par Had routine checkup with PCP recently and found to have persistent elevated Cr of 1.7\par

## 2019-05-15 ENCOUNTER — MEDICATION RENEWAL (OUTPATIENT)
Age: 84
End: 2019-05-15

## 2019-05-16 ENCOUNTER — OUTPATIENT (OUTPATIENT)
Dept: OUTPATIENT SERVICES | Facility: HOSPITAL | Age: 84
LOS: 1 days | End: 2019-05-16
Payer: MEDICARE

## 2019-05-16 ENCOUNTER — OUTPATIENT (OUTPATIENT)
Dept: OUTPATIENT SERVICES | Facility: HOSPITAL | Age: 84
LOS: 1 days | End: 2019-05-16

## 2019-05-16 ENCOUNTER — APPOINTMENT (OUTPATIENT)
Dept: ULTRASOUND IMAGING | Facility: CLINIC | Age: 84
End: 2019-05-16
Payer: MEDICARE

## 2019-05-16 DIAGNOSIS — K42.9 UMBILICAL HERNIA WITHOUT OBSTRUCTION OR GANGRENE: Chronic | ICD-10-CM

## 2019-05-16 DIAGNOSIS — Z00.8 ENCOUNTER FOR OTHER GENERAL EXAMINATION: ICD-10-CM

## 2019-05-16 DIAGNOSIS — R79.89 OTHER SPECIFIED ABNORMAL FINDINGS OF BLOOD CHEMISTRY: ICD-10-CM

## 2019-05-16 DIAGNOSIS — M16.11 UNILATERAL PRIMARY OSTEOARTHRITIS, RIGHT HIP: Chronic | ICD-10-CM

## 2019-05-16 PROCEDURE — 76775 US EXAM ABDO BACK WALL LIM: CPT

## 2019-05-16 PROCEDURE — 76775 US EXAM ABDO BACK WALL LIM: CPT | Mod: 26

## 2019-05-17 ENCOUNTER — APPOINTMENT (OUTPATIENT)
Dept: INTERNAL MEDICINE | Facility: CLINIC | Age: 84
End: 2019-05-17
Payer: MEDICARE

## 2019-05-17 VITALS — HEART RATE: 62 BPM | SYSTOLIC BLOOD PRESSURE: 118 MMHG | DIASTOLIC BLOOD PRESSURE: 50 MMHG

## 2019-05-17 PROCEDURE — 99214 OFFICE O/P EST MOD 30 MIN: CPT

## 2019-05-17 NOTE — HISTORY OF PRESENT ILLNESS
[FreeTextEntry1] : Seen yesterday urgent care    CXR was thought originally to suggest CHF but later interpretation NOT.  EKG there RBBB (old)  Went because of SOB and dec effort tolerance / fatigue.  Adherent to complex multi med program   No cp   No palp [de-identified] : Hx of bronchospastic disease with episodic wheezing and prolonged exhalation \par Recent flare

## 2019-05-17 NOTE — ASSESSMENT
[FreeTextEntry1] : Current breathlessness exacerbation likely bronchospastic in origin\par \par Stye   under treatment with erythro topically\par \par Get Echo\par EDE\par \par R V  10 d

## 2019-05-17 NOTE — PHYSICAL EXAM
[Regular Rhythm] : with a regular rhythm [Normal Rate] : normal rate  [de-identified] : bronchospastic with prolonged exhalation [Normal S1, S2] : normal S1 and S2 [No Murmur] : no murmur heard [de-identified] : both legs are edematous left lower than the right

## 2019-05-23 ENCOUNTER — APPOINTMENT (OUTPATIENT)
Dept: CARDIOLOGY | Facility: CLINIC | Age: 84
End: 2019-05-23
Payer: MEDICARE

## 2019-05-23 ENCOUNTER — APPOINTMENT (OUTPATIENT)
Dept: INTERNAL MEDICINE | Facility: CLINIC | Age: 84
End: 2019-05-23
Payer: MEDICARE

## 2019-05-23 ENCOUNTER — NON-APPOINTMENT (OUTPATIENT)
Age: 84
End: 2019-05-23

## 2019-05-23 PROCEDURE — 93306 TTE W/DOPPLER COMPLETE: CPT

## 2019-05-23 PROCEDURE — ZZZZZ: CPT

## 2019-05-23 PROCEDURE — 93000 ELECTROCARDIOGRAM COMPLETE: CPT

## 2019-05-23 PROCEDURE — 99204 OFFICE O/P NEW MOD 45 MIN: CPT

## 2019-05-23 PROCEDURE — 36415 COLL VENOUS BLD VENIPUNCTURE: CPT

## 2019-05-23 NOTE — HISTORY OF PRESENT ILLNESS
[FreeTextEntry1] : 89F with resistant HTN, HLD, DM, TIA, CKD who presents for evaluation for atypical.chest pain. Pt notes for last 2-3 days, notes bilateral shoulder, neck discomfort, with radiation down L arm.  Sporadic, nonexertional. She notes having an episode of dyspnea on exertion last week, was seen by PCP Dr. Carias and was thought to be secondary to bronchospastic disease, was given medrol dose pack with resolution of breathlessness. Breathing back to normal. No palpitations, no dizziness, lightheadedness, no syncope. Notes chronic, mild LE edema. 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 Former smoker (Quit 30 years ago). Lives in 2 family house with daughter. 2 sons had stents in their 50s. \par \par ECG: SR with RBBB (old) APC\par Lipids 4/2019: 130/46/70/72\par \par Amlodipine 5mg, Atorvastatin 80mg, Benazepril 20mg, Clonidine 0.3mg patch, Clopidogrel 75mg, Lasix 40mg, Labetalol 300mg BID,

## 2019-05-23 NOTE — PHYSICAL EXAM
[General Appearance - Well Developed] : well developed [Normal Appearance] : normal appearance [Well Groomed] : well groomed [General Appearance - Well Nourished] : well nourished [No Deformities] : no deformities [General Appearance - In No Acute Distress] : no acute distress [Normal Conjunctiva] : the conjunctiva exhibited no abnormalities [Eyelids - No Xanthelasma] : the eyelids demonstrated no xanthelasmas [Normal Oral Mucosa] : normal oral mucosa [No Oral Pallor] : no oral pallor [No Oral Cyanosis] : no oral cyanosis [Normal Jugular Venous A Waves Present] : normal jugular venous A waves present [Normal Jugular Venous V Waves Present] : normal jugular venous V waves present [No Jugular Venous Lerner A Waves] : no jugular venous lerner A waves [Heart Rate And Rhythm] : heart rate and rhythm were normal [Heart Sounds] : normal S1 and S2 [Murmurs] : no murmurs present [Respiration, Rhythm And Depth] : normal respiratory rhythm and effort [Exaggerated Use Of Accessory Muscles For Inspiration] : no accessory muscle use [Auscultation Breath Sounds / Voice Sounds] : lungs were clear to auscultation bilaterally [Abdomen Soft] : soft [Abdomen Tenderness] : non-tender [Abdomen Mass (___ Cm)] : no abdominal mass palpated [Abnormal Walk] : normal gait [Nail Clubbing] : no clubbing of the fingernails [Cyanosis, Localized] : no localized cyanosis [Petechial Hemorrhages (___cm)] : no petechial hemorrhages [Skin Color & Pigmentation] : normal skin color and pigmentation [] : no rash [No Venous Stasis] : no venous stasis [Skin Lesions] : no skin lesions [No Skin Ulcers] : no skin ulcer [No Xanthoma] : no  xanthoma was observed [Oriented To Time, Place, And Person] : oriented to person, place, and time [Affect] : the affect was normal [Mood] : the mood was normal [No Anxiety] : not feeling anxious [FreeTextEntry1] : trace LE edema

## 2019-05-23 NOTE — DISCUSSION/SUMMARY
[FreeTextEntry1] : 89F with resistant HTN, HLD, DM, TIA, CKD\par \par 1. CP:  Some concerning features in a diabetic female. Despite her age she is very functional with a good quality of life.  \par \par -Check troponin stat to rule out acute event\par -Pharm NST\par -If pain worsens or intensifies, to go to ER. Daughter and patient are in agreement and understanding\par \par 2. HTN: BP checked today, within normal limits.  Cont current therapy\par \par 3. TIA: On plavix, statin\par \par Will follow up after stress testing

## 2019-05-23 NOTE — REVIEW OF SYSTEMS
[Chest  Pressure] : chest pressure [Chest Pain] : chest pain [Fever] : no fever [Chills] : no chills [Blurry Vision] : no blurred vision [Eyeglasses] : not currently wearing eyeglasses [Dyspnea on exertion] : not dyspnea during exertion [Abdominal Pain] : no abdominal pain [Heartburn] : no heartburn [Dysphagia] : no dysphagia [Dysuria] : no dysuria [Incontinence] : no incontinence [Joint Pain] : no joint pain [Muscle Cramps] : no muscle cramps [Dizziness] : no dizziness

## 2019-05-24 LAB — TROPONIN I SERPL-MCNC: <0.01 NG/ML

## 2019-05-29 ENCOUNTER — MED ADMIN CHARGE (OUTPATIENT)
Age: 84
End: 2019-05-29

## 2019-05-29 ENCOUNTER — APPOINTMENT (OUTPATIENT)
Dept: CARDIOLOGY | Facility: CLINIC | Age: 84
End: 2019-05-29
Payer: MEDICARE

## 2019-05-29 PROCEDURE — 93015 CV STRESS TEST SUPVJ I&R: CPT

## 2019-05-29 PROCEDURE — 78452 HT MUSCLE IMAGE SPECT MULT: CPT

## 2019-05-29 PROCEDURE — A9500: CPT

## 2019-05-30 ENCOUNTER — APPOINTMENT (OUTPATIENT)
Dept: INTERNAL MEDICINE | Facility: CLINIC | Age: 84
End: 2019-05-30
Payer: MEDICARE

## 2019-05-31 RX ORDER — REGADENOSON 0.08 MG/ML
0.4 INJECTION, SOLUTION INTRAVENOUS
Qty: 1 | Refills: 0 | Status: COMPLETED | OUTPATIENT
Start: 2019-05-31

## 2019-05-31 RX ADMIN — REGADENOSON 4 MG/5ML: 0.08 INJECTION, SOLUTION INTRAVENOUS at 00:00

## 2019-06-10 ENCOUNTER — FORM ENCOUNTER (OUTPATIENT)
Age: 84
End: 2019-06-10

## 2019-06-10 ENCOUNTER — APPOINTMENT (OUTPATIENT)
Dept: INTERNAL MEDICINE | Facility: CLINIC | Age: 84
End: 2019-06-10
Payer: MEDICARE

## 2019-06-10 VITALS
SYSTOLIC BLOOD PRESSURE: 148 MMHG | WEIGHT: 195 LBS | HEART RATE: 76 BPM | OXYGEN SATURATION: 94 % | HEIGHT: 63 IN | DIASTOLIC BLOOD PRESSURE: 74 MMHG | BODY MASS INDEX: 34.55 KG/M2

## 2019-06-10 DIAGNOSIS — G45.9 TRANSIENT CEREBRAL ISCHEMIC ATTACK, UNSPECIFIED: ICD-10-CM

## 2019-06-10 DIAGNOSIS — M79.89 OTHER SPECIFIED SOFT TISSUE DISORDERS: ICD-10-CM

## 2019-06-10 PROCEDURE — 99214 OFFICE O/P EST MOD 30 MIN: CPT | Mod: 25

## 2019-06-10 PROCEDURE — 36415 COLL VENOUS BLD VENIPUNCTURE: CPT

## 2019-06-10 RX ORDER — METHYLPREDNISOLONE 4 MG/1
4 TABLET ORAL
Qty: 1 | Refills: 1 | Status: DISCONTINUED | COMMUNITY
Start: 2019-05-17 | End: 2019-06-10

## 2019-06-10 NOTE — PHYSICAL EXAM
[Regular Rhythm] : with a regular rhythm [Normal Rate] : normal rate  [No Murmur] : no murmur heard [Normal S1, S2] : normal S1 and S2 [de-identified] : bronchospastic with prolonged exhalation [de-identified] : both legs are edematous left lower than the right

## 2019-06-10 NOTE — HISTORY OF PRESENT ILLNESS
[FreeTextEntry1] : Nuclear stress   "non ischemic"\par \par Still having cp  as previously    atypical\par SOB  modified but present\par \par Saw ortho    Rx:  OA   muscle spasm  \par     trying tizanidine 2 mg (MD Perez) [de-identified] : Adherent to meds\par No apparent side effects

## 2019-06-11 ENCOUNTER — OUTPATIENT (OUTPATIENT)
Dept: OUTPATIENT SERVICES | Facility: HOSPITAL | Age: 84
LOS: 1 days | End: 2019-06-11
Payer: MEDICARE

## 2019-06-11 ENCOUNTER — APPOINTMENT (OUTPATIENT)
Dept: CT IMAGING | Facility: CLINIC | Age: 84
End: 2019-06-11
Payer: MEDICARE

## 2019-06-11 DIAGNOSIS — M16.11 UNILATERAL PRIMARY OSTEOARTHRITIS, RIGHT HIP: Chronic | ICD-10-CM

## 2019-06-11 DIAGNOSIS — G45.9 TRANSIENT CEREBRAL ISCHEMIC ATTACK, UNSPECIFIED: ICD-10-CM

## 2019-06-11 DIAGNOSIS — K42.9 UMBILICAL HERNIA WITHOUT OBSTRUCTION OR GANGRENE: Chronic | ICD-10-CM

## 2019-06-11 DIAGNOSIS — M79.89 OTHER SPECIFIED SOFT TISSUE DISORDERS: ICD-10-CM

## 2019-06-11 LAB
ALBUMIN SERPL ELPH-MCNC: 4.3 G/DL
ALP BLD-CCNC: 85 U/L
ALT SERPL-CCNC: 16 U/L
ANION GAP SERPL CALC-SCNC: 13 MMOL/L
AST SERPL-CCNC: 20 U/L
BASOPHILS # BLD AUTO: 0.06 K/UL
BASOPHILS NFR BLD AUTO: 0.8 %
BILIRUB SERPL-MCNC: 0.5 MG/DL
BUN SERPL-MCNC: 27 MG/DL
CALCIUM SERPL-MCNC: 8.4 MG/DL
CHLORIDE SERPL-SCNC: 107 MMOL/L
CHOLEST SERPL-MCNC: 156 MG/DL
CHOLEST/HDLC SERPL: 2.4 RATIO
CK SERPL-CCNC: 95 U/L
CO2 SERPL-SCNC: 25 MMOL/L
CREAT SERPL-MCNC: 1.33 MG/DL
EOSINOPHIL # BLD AUTO: 0.39 K/UL
EOSINOPHIL NFR BLD AUTO: 5.4 %
ESTIMATED AVERAGE GLUCOSE: 143 MG/DL
GGT SERPL-CCNC: 16 U/L
GLUCOSE SERPL-MCNC: 103 MG/DL
HBA1C MFR BLD HPLC: 6.6 %
HCT VFR BLD CALC: 35 %
HDLC SERPL-MCNC: 66 MG/DL
HGB BLD-MCNC: 10.9 G/DL
IMM GRANULOCYTES NFR BLD AUTO: 0.4 %
LDLC SERPL CALC-MCNC: 79 MG/DL
LYMPHOCYTES # BLD AUTO: 1.4 K/UL
LYMPHOCYTES NFR BLD AUTO: 19.3 %
MAN DIFF?: NORMAL
MCHC RBC-ENTMCNC: 29.3 PG
MCHC RBC-ENTMCNC: 31.1 GM/DL
MCV RBC AUTO: 94.1 FL
MONOCYTES # BLD AUTO: 0.49 K/UL
MONOCYTES NFR BLD AUTO: 6.7 %
NEUTROPHILS # BLD AUTO: 4.9 K/UL
NEUTROPHILS NFR BLD AUTO: 67.4 %
PLATELET # BLD AUTO: 251 K/UL
POTASSIUM SERPL-SCNC: 4.5 MMOL/L
PROT SERPL-MCNC: 6.5 G/DL
RBC # BLD: 3.72 M/UL
RBC # FLD: 16.4 %
SODIUM SERPL-SCNC: 145 MMOL/L
T4 FREE SERPL-MCNC: 1.1 NG/DL
TRIGL SERPL-MCNC: 55 MG/DL
TSH SERPL-ACNC: 1.2 UIU/ML
WBC # FLD AUTO: 7.27 K/UL

## 2019-06-11 PROCEDURE — 74176 CT ABD & PELVIS W/O CONTRAST: CPT | Mod: 26

## 2019-06-11 PROCEDURE — 74176 CT ABD & PELVIS W/O CONTRAST: CPT

## 2019-06-19 ENCOUNTER — APPOINTMENT (OUTPATIENT)
Dept: INTERNAL MEDICINE | Facility: CLINIC | Age: 84
End: 2019-06-19
Payer: MEDICARE

## 2019-06-19 VITALS
BODY MASS INDEX: 34.54 KG/M2 | DIASTOLIC BLOOD PRESSURE: 58 MMHG | HEART RATE: 62 BPM | WEIGHT: 195 LBS | SYSTOLIC BLOOD PRESSURE: 104 MMHG

## 2019-06-19 DIAGNOSIS — R07.89 OTHER CHEST PAIN: ICD-10-CM

## 2019-06-19 PROCEDURE — 99213 OFFICE O/P EST LOW 20 MIN: CPT

## 2019-06-19 NOTE — HISTORY OF PRESENT ILLNESS
[FreeTextEntry1] : Tired and sleepy\par \par Cx spine radiculopathy\par Tizanadine did not help\par Sleepng semi erect\par \par CT abd   gallstones   rest wnl\par \par Hgb  up to 10.9\par Cre 1.33\par TSH and T4   both wnl [de-identified] : Cardiac funct  stable

## 2019-06-19 NOTE — PHYSICAL EXAM
[Normal Rate] : normal rate  [Normal S1, S2] : normal S1 and S2 [Regular Rhythm] : with a regular rhythm [No Murmur] : no murmur heard [de-identified] : bronchospastic with prolonged exhalation [de-identified] : both legs are edematous left lower than the right

## 2019-07-18 ENCOUNTER — APPOINTMENT (OUTPATIENT)
Dept: INTERNAL MEDICINE | Facility: CLINIC | Age: 84
End: 2019-07-18
Payer: MEDICARE

## 2019-07-18 VITALS
HEART RATE: 71 BPM | HEIGHT: 63 IN | WEIGHT: 195 LBS | BODY MASS INDEX: 34.55 KG/M2 | DIASTOLIC BLOOD PRESSURE: 61 MMHG | RESPIRATION RATE: 16 BRPM | OXYGEN SATURATION: 96 % | SYSTOLIC BLOOD PRESSURE: 147 MMHG

## 2019-07-18 PROCEDURE — 99213 OFFICE O/P EST LOW 20 MIN: CPT

## 2019-07-18 RX ORDER — AMOXICILLIN AND CLAVULANATE POTASSIUM 875; 125 MG/1; MG/1
875-125 TABLET, COATED ORAL
Qty: 20 | Refills: 0 | Status: COMPLETED | COMMUNITY
Start: 2019-07-13

## 2019-07-18 RX ORDER — DOXYCYCLINE 100 MG/1
100 CAPSULE ORAL
Qty: 20 | Refills: 0 | Status: COMPLETED | COMMUNITY
Start: 2019-07-13

## 2019-07-18 NOTE — HISTORY OF PRESENT ILLNESS
[FreeTextEntry1] : Followup from a visit to urgent care Center.\par \par Developed redness and swelling distally in the left lower extremity anteriorly.  Diagnosis was made of cellulitis.  Therapy was initiated with Augmentin 875, and doxycycline 100 mg.  During the intervening time, the erythema has not spread.  There has been moderate swelling of the skin and edema in the left lower extremity.

## 2019-07-30 ENCOUNTER — APPOINTMENT (OUTPATIENT)
Dept: INTERNAL MEDICINE | Facility: CLINIC | Age: 84
End: 2019-07-30
Payer: MEDICARE

## 2019-07-30 VITALS
HEART RATE: 65 BPM | BODY MASS INDEX: 34.55 KG/M2 | RESPIRATION RATE: 16 BRPM | OXYGEN SATURATION: 97 % | SYSTOLIC BLOOD PRESSURE: 91 MMHG | WEIGHT: 195 LBS | HEIGHT: 63 IN | DIASTOLIC BLOOD PRESSURE: 56 MMHG

## 2019-07-30 PROCEDURE — 99213 OFFICE O/P EST LOW 20 MIN: CPT

## 2019-07-30 RX ORDER — CLONIDINE 0.3 MG/24H
0.3 PATCH, EXTENDED RELEASE TRANSDERMAL
Qty: 12 | Refills: 3 | Status: DISCONTINUED | COMMUNITY
Start: 1900-01-01 | End: 2019-07-30

## 2019-07-30 NOTE — PHYSICAL EXAM
[No Acute Distress] : no acute distress [Well Nourished] : well nourished [Well Developed] : well developed [Well-Appearing] : well-appearing [Normal Sclera/Conjunctiva] : normal sclera/conjunctiva [PERRL] : pupils equal round and reactive to light [EOMI] : extraocular movements intact [Normal Outer Ear/Nose] : the outer ears and nose were normal in appearance [Normal Oropharynx] : the oropharynx was normal [No JVD] : no jugular venous distention [No Lymphadenopathy] : no lymphadenopathy [Supple] : supple [Thyroid Normal, No Nodules] : the thyroid was normal and there were no nodules present [No Respiratory Distress] : no respiratory distress  [No Accessory Muscle Use] : no accessory muscle use [Normal Rate] : normal rate  [Clear to Auscultation] : lungs were clear to auscultation bilaterally [Normal S1, S2] : normal S1 and S2 [Regular Rhythm] : with a regular rhythm [No Murmur] : no murmur heard [No Varicosities] : no varicosities [No Abdominal Bruit] : a ~M bruit was not heard ~T in the abdomen [No Carotid Bruits] : no carotid bruits [Pedal Pulses Present] : the pedal pulses are present [No Edema] : there was no peripheral edema [No Palpable Aorta] : no palpable aorta [No Extremity Clubbing/Cyanosis] : no extremity clubbing/cyanosis [Soft] : abdomen soft [Non Tender] : non-tender [Non-distended] : non-distended [No Masses] : no abdominal mass palpated [No HSM] : no HSM [Normal Bowel Sounds] : normal bowel sounds [Normal Posterior Cervical Nodes] : no posterior cervical lymphadenopathy [Normal Anterior Cervical Nodes] : no anterior cervical lymphadenopathy [No CVA Tenderness] : no CVA  tenderness [No Spinal Tenderness] : no spinal tenderness [No Joint Swelling] : no joint swelling [No Rash] : no rash [Grossly Normal Strength/Tone] : grossly normal strength/tone [Coordination Grossly Intact] : coordination grossly intact [Normal Gait] : normal gait [No Focal Deficits] : no focal deficits [Normal Affect] : the affect was normal [Deep Tendon Reflexes (DTR)] : deep tendon reflexes were 2+ and symmetric [Normal Insight/Judgement] : insight and judgment were intact

## 2019-07-30 NOTE — HISTORY OF PRESENT ILLNESS
[FreeTextEntry1] : F/U\par \par LLE cellulitis   resolved\par \par Unsteady getting about [de-identified] : No falls\par Still working from home

## 2019-08-06 ENCOUNTER — APPOINTMENT (OUTPATIENT)
Dept: INTERNAL MEDICINE | Facility: CLINIC | Age: 84
End: 2019-08-06
Payer: MEDICARE

## 2019-08-06 VITALS
HEART RATE: 68 BPM | SYSTOLIC BLOOD PRESSURE: 177 MMHG | DIASTOLIC BLOOD PRESSURE: 75 MMHG | RESPIRATION RATE: 16 BRPM | WEIGHT: 195 LBS | BODY MASS INDEX: 34.55 KG/M2 | OXYGEN SATURATION: 96 % | HEIGHT: 63 IN

## 2019-08-06 PROCEDURE — 99213 OFFICE O/P EST LOW 20 MIN: CPT

## 2019-08-06 NOTE — HISTORY OF PRESENT ILLNESS
[FreeTextEntry1] : Follow up   on labile BP\par \par Off the amlod / off the clonidine / on reduced dose labetalol [de-identified] : Still labile\par No unsteadiness\par Has not fallen\par

## 2019-08-06 NOTE — PHYSICAL EXAM
[Well Nourished] : well nourished [No Acute Distress] : no acute distress [Well Developed] : well developed [Well-Appearing] : well-appearing [PERRL] : pupils equal round and reactive to light [Normal Sclera/Conjunctiva] : normal sclera/conjunctiva [EOMI] : extraocular movements intact [Normal Oropharynx] : the oropharynx was normal [Normal Outer Ear/Nose] : the outer ears and nose were normal in appearance [No Lymphadenopathy] : no lymphadenopathy [No JVD] : no jugular venous distention [Supple] : supple [Thyroid Normal, No Nodules] : the thyroid was normal and there were no nodules present [No Respiratory Distress] : no respiratory distress  [No Accessory Muscle Use] : no accessory muscle use [Clear to Auscultation] : lungs were clear to auscultation bilaterally [Normal Rate] : normal rate  [Regular Rhythm] : with a regular rhythm [Normal S1, S2] : normal S1 and S2 [No Murmur] : no murmur heard [No Carotid Bruits] : no carotid bruits [No Abdominal Bruit] : a ~M bruit was not heard ~T in the abdomen [No Varicosities] : no varicosities [Pedal Pulses Present] : the pedal pulses are present [No Palpable Aorta] : no palpable aorta [No Edema] : there was no peripheral edema [Soft] : abdomen soft [No Extremity Clubbing/Cyanosis] : no extremity clubbing/cyanosis [Non Tender] : non-tender [Non-distended] : non-distended [No Masses] : no abdominal mass palpated [No HSM] : no HSM [Normal Posterior Cervical Nodes] : no posterior cervical lymphadenopathy [Normal Bowel Sounds] : normal bowel sounds [No CVA Tenderness] : no CVA  tenderness [Normal Anterior Cervical Nodes] : no anterior cervical lymphadenopathy [No Joint Swelling] : no joint swelling [No Spinal Tenderness] : no spinal tenderness [Grossly Normal Strength/Tone] : grossly normal strength/tone [No Rash] : no rash [Coordination Grossly Intact] : coordination grossly intact [No Focal Deficits] : no focal deficits [Normal Gait] : normal gait [Deep Tendon Reflexes (DTR)] : deep tendon reflexes were 2+ and symmetric [Normal Affect] : the affect was normal [Normal Insight/Judgement] : insight and judgment were intact

## 2019-09-05 ENCOUNTER — APPOINTMENT (OUTPATIENT)
Dept: INTERNAL MEDICINE | Facility: CLINIC | Age: 84
End: 2019-09-05
Payer: MEDICARE

## 2019-09-05 VITALS
RESPIRATION RATE: 16 BRPM | WEIGHT: 195 LBS | HEIGHT: 63 IN | DIASTOLIC BLOOD PRESSURE: 74 MMHG | HEART RATE: 68 BPM | BODY MASS INDEX: 34.55 KG/M2 | OXYGEN SATURATION: 95 % | SYSTOLIC BLOOD PRESSURE: 136 MMHG

## 2019-09-05 PROCEDURE — 99213 OFFICE O/P EST LOW 20 MIN: CPT

## 2019-09-05 NOTE — HISTORY OF PRESENT ILLNESS
[FreeTextEntry1] : F/U  DM   HBP   Elev chol [de-identified] : Monitoring BP at home off the clonidine\par Readings reasonable   and more animated and less sedated\par \par Lipids   last acceptable\par \par Sugars   acceptable

## 2019-09-05 NOTE — PHYSICAL EXAM
[No Acute Distress] : no acute distress [Well Developed] : well developed [Well Nourished] : well nourished [Well-Appearing] : well-appearing [Normal Sclera/Conjunctiva] : normal sclera/conjunctiva [Normal Outer Ear/Nose] : the outer ears and nose were normal in appearance [EOMI] : extraocular movements intact [PERRL] : pupils equal round and reactive to light [No JVD] : no jugular venous distention [Normal Oropharynx] : the oropharynx was normal [Supple] : supple [Thyroid Normal, No Nodules] : the thyroid was normal and there were no nodules present [No Lymphadenopathy] : no lymphadenopathy [No Accessory Muscle Use] : no accessory muscle use [No Respiratory Distress] : no respiratory distress  [Clear to Auscultation] : lungs were clear to auscultation bilaterally [Normal Rate] : normal rate  [Regular Rhythm] : with a regular rhythm [No Murmur] : no murmur heard [Normal S1, S2] : normal S1 and S2 [No Abdominal Bruit] : a ~M bruit was not heard ~T in the abdomen [No Carotid Bruits] : no carotid bruits [No Varicosities] : no varicosities [Pedal Pulses Present] : the pedal pulses are present [No Palpable Aorta] : no palpable aorta [No Edema] : there was no peripheral edema [Soft] : abdomen soft [No Extremity Clubbing/Cyanosis] : no extremity clubbing/cyanosis [Non-distended] : non-distended [Non Tender] : non-tender [No Masses] : no abdominal mass palpated [Normal Bowel Sounds] : normal bowel sounds [No HSM] : no HSM [Normal Anterior Cervical Nodes] : no anterior cervical lymphadenopathy [Normal Posterior Cervical Nodes] : no posterior cervical lymphadenopathy [No CVA Tenderness] : no CVA  tenderness [No Spinal Tenderness] : no spinal tenderness [No Joint Swelling] : no joint swelling [Grossly Normal Strength/Tone] : grossly normal strength/tone [No Rash] : no rash [No Focal Deficits] : no focal deficits [Coordination Grossly Intact] : coordination grossly intact [Normal Gait] : normal gait [Normal Affect] : the affect was normal [Deep Tendon Reflexes (DTR)] : deep tendon reflexes were 2+ and symmetric [Normal Insight/Judgement] : insight and judgment were intact

## 2019-09-10 ENCOUNTER — EMERGENCY (EMERGENCY)
Facility: HOSPITAL | Age: 84
LOS: 0 days | Discharge: HOME | End: 2019-09-11
Attending: EMERGENCY MEDICINE
Payer: MEDICARE

## 2019-09-10 VITALS
RESPIRATION RATE: 17 BRPM | SYSTOLIC BLOOD PRESSURE: 196 MMHG | DIASTOLIC BLOOD PRESSURE: 99 MMHG | HEART RATE: 79 BPM | OXYGEN SATURATION: 93 % | WEIGHT: 190.04 LBS | HEIGHT: 65 IN | TEMPERATURE: 100 F

## 2019-09-10 DIAGNOSIS — E03.9 HYPOTHYROIDISM, UNSPECIFIED: ICD-10-CM

## 2019-09-10 DIAGNOSIS — Z79.01 LONG TERM (CURRENT) USE OF ANTICOAGULANTS: ICD-10-CM

## 2019-09-10 DIAGNOSIS — M16.11 UNILATERAL PRIMARY OSTEOARTHRITIS, RIGHT HIP: ICD-10-CM

## 2019-09-10 DIAGNOSIS — K42.9 UMBILICAL HERNIA WITHOUT OBSTRUCTION OR GANGRENE: Chronic | ICD-10-CM

## 2019-09-10 DIAGNOSIS — E11.9 TYPE 2 DIABETES MELLITUS WITHOUT COMPLICATIONS: ICD-10-CM

## 2019-09-10 DIAGNOSIS — M79.661 PAIN IN RIGHT LOWER LEG: ICD-10-CM

## 2019-09-10 DIAGNOSIS — I10 ESSENTIAL (PRIMARY) HYPERTENSION: ICD-10-CM

## 2019-09-10 DIAGNOSIS — K21.9 GASTRO-ESOPHAGEAL REFLUX DISEASE WITHOUT ESOPHAGITIS: ICD-10-CM

## 2019-09-10 DIAGNOSIS — M16.11 UNILATERAL PRIMARY OSTEOARTHRITIS, RIGHT HIP: Chronic | ICD-10-CM

## 2019-09-10 DIAGNOSIS — M10.061 IDIOPATHIC GOUT, RIGHT KNEE: ICD-10-CM

## 2019-09-10 PROCEDURE — 99284 EMERGENCY DEPT VISIT MOD MDM: CPT

## 2019-09-10 PROCEDURE — 93010 ELECTROCARDIOGRAM REPORT: CPT

## 2019-09-11 VITALS
SYSTOLIC BLOOD PRESSURE: 203 MMHG | HEART RATE: 84 BPM | RESPIRATION RATE: 23 BRPM | DIASTOLIC BLOOD PRESSURE: 84 MMHG | OXYGEN SATURATION: 93 %

## 2019-09-11 LAB
ALBUMIN SERPL ELPH-MCNC: 3.3 G/DL — SIGNIFICANT CHANGE UP (ref 3.3–5)
ALP SERPL-CCNC: 87 U/L — SIGNIFICANT CHANGE UP (ref 40–120)
ALT FLD-CCNC: 22 U/L — SIGNIFICANT CHANGE UP (ref 12–78)
ANION GAP SERPL CALC-SCNC: 11 MMOL/L — SIGNIFICANT CHANGE UP (ref 5–17)
APTT BLD: 25.1 SEC — LOW (ref 28.5–37)
AST SERPL-CCNC: 21 U/L — SIGNIFICANT CHANGE UP (ref 15–37)
BASOPHILS # BLD AUTO: 0.04 K/UL — SIGNIFICANT CHANGE UP (ref 0–0.2)
BASOPHILS NFR BLD AUTO: 0.4 % — SIGNIFICANT CHANGE UP (ref 0–2)
BILIRUB SERPL-MCNC: 0.5 MG/DL — SIGNIFICANT CHANGE UP (ref 0.2–1.2)
BUN SERPL-MCNC: 30 MG/DL — HIGH (ref 7–23)
CALCIUM SERPL-MCNC: 7.6 MG/DL — LOW (ref 8.5–10.1)
CHLORIDE SERPL-SCNC: 112 MMOL/L — HIGH (ref 96–108)
CK SERPL-CCNC: 83 U/L — SIGNIFICANT CHANGE UP (ref 26–192)
CO2 SERPL-SCNC: 24 MMOL/L — SIGNIFICANT CHANGE UP (ref 22–31)
CREAT SERPL-MCNC: 1.96 MG/DL — HIGH (ref 0.5–1.3)
EOSINOPHIL # BLD AUTO: 0.67 K/UL — HIGH (ref 0–0.5)
EOSINOPHIL NFR BLD AUTO: 7.3 % — HIGH (ref 0–6)
GLUCOSE SERPL-MCNC: 121 MG/DL — HIGH (ref 70–99)
HCT VFR BLD CALC: 33 % — LOW (ref 34.5–45)
HGB BLD-MCNC: 10.5 G/DL — LOW (ref 11.5–15.5)
IMM GRANULOCYTES NFR BLD AUTO: 0.2 % — SIGNIFICANT CHANGE UP (ref 0–1.5)
INR BLD: 1.03 RATIO — SIGNIFICANT CHANGE UP (ref 0.88–1.16)
LYMPHOCYTES # BLD AUTO: 1.34 K/UL — SIGNIFICANT CHANGE UP (ref 1–3.3)
LYMPHOCYTES # BLD AUTO: 14.7 % — SIGNIFICANT CHANGE UP (ref 13–44)
MCHC RBC-ENTMCNC: 29.7 PG — SIGNIFICANT CHANGE UP (ref 27–34)
MCHC RBC-ENTMCNC: 31.8 GM/DL — LOW (ref 32–36)
MCV RBC AUTO: 93.2 FL — SIGNIFICANT CHANGE UP (ref 80–100)
MONOCYTES # BLD AUTO: 0.61 K/UL — SIGNIFICANT CHANGE UP (ref 0–0.9)
MONOCYTES NFR BLD AUTO: 6.7 % — SIGNIFICANT CHANGE UP (ref 2–14)
NEUTROPHILS # BLD AUTO: 6.44 K/UL — SIGNIFICANT CHANGE UP (ref 1.8–7.4)
NEUTROPHILS NFR BLD AUTO: 70.7 % — SIGNIFICANT CHANGE UP (ref 43–77)
NRBC # BLD: 0 /100 WBCS — SIGNIFICANT CHANGE UP (ref 0–0)
PLATELET # BLD AUTO: 232 K/UL — SIGNIFICANT CHANGE UP (ref 150–400)
POTASSIUM SERPL-MCNC: 4.3 MMOL/L — SIGNIFICANT CHANGE UP (ref 3.5–5.3)
POTASSIUM SERPL-SCNC: 4.3 MMOL/L — SIGNIFICANT CHANGE UP (ref 3.5–5.3)
PROT SERPL-MCNC: 6.7 GM/DL — SIGNIFICANT CHANGE UP (ref 6–8.3)
PROTHROM AB SERPL-ACNC: 11.6 SEC — SIGNIFICANT CHANGE UP (ref 10–12.9)
RBC # BLD: 3.54 M/UL — LOW (ref 3.8–5.2)
RBC # FLD: 15.2 % — HIGH (ref 10.3–14.5)
SODIUM SERPL-SCNC: 147 MMOL/L — HIGH (ref 135–145)
WBC # BLD: 9.12 K/UL — SIGNIFICANT CHANGE UP (ref 3.8–10.5)
WBC # FLD AUTO: 9.12 K/UL — SIGNIFICANT CHANGE UP (ref 3.8–10.5)

## 2019-09-11 PROCEDURE — 73562 X-RAY EXAM OF KNEE 3: CPT | Mod: 26,RT

## 2019-09-11 PROCEDURE — 93971 EXTREMITY STUDY: CPT | Mod: 26,RT

## 2019-09-11 PROCEDURE — 73700 CT LOWER EXTREMITY W/O DYE: CPT | Mod: 26,RT

## 2019-09-11 RX ORDER — AMLODIPINE BESYLATE 2.5 MG/1
5 TABLET ORAL ONCE
Refills: 0 | Status: COMPLETED | OUTPATIENT
Start: 2019-09-11 | End: 2019-09-11

## 2019-09-11 RX ORDER — OXYCODONE AND ACETAMINOPHEN 5; 325 MG/1; MG/1
1 TABLET ORAL ONCE
Refills: 0 | Status: DISCONTINUED | OUTPATIENT
Start: 2019-09-11 | End: 2019-09-11

## 2019-09-11 RX ORDER — LABETALOL HCL 100 MG
300 TABLET ORAL ONCE
Refills: 0 | Status: COMPLETED | OUTPATIENT
Start: 2019-09-11 | End: 2019-09-11

## 2019-09-11 RX ORDER — SODIUM CHLORIDE 9 MG/ML
1000 INJECTION INTRAMUSCULAR; INTRAVENOUS; SUBCUTANEOUS ONCE
Refills: 0 | Status: COMPLETED | OUTPATIENT
Start: 2019-09-11 | End: 2019-09-11

## 2019-09-11 RX ORDER — FUROSEMIDE 40 MG
40 TABLET ORAL ONCE
Refills: 0 | Status: COMPLETED | OUTPATIENT
Start: 2019-09-11 | End: 2019-09-11

## 2019-09-11 RX ORDER — LISINOPRIL 2.5 MG/1
20 TABLET ORAL DAILY
Refills: 0 | Status: DISCONTINUED | OUTPATIENT
Start: 2019-09-11 | End: 2019-09-11

## 2019-09-11 RX ORDER — ONDANSETRON 8 MG/1
4 TABLET, FILM COATED ORAL ONCE
Refills: 0 | Status: COMPLETED | OUTPATIENT
Start: 2019-09-11 | End: 2019-09-11

## 2019-09-11 RX ORDER — IBUPROFEN 200 MG
600 TABLET ORAL ONCE
Refills: 0 | Status: COMPLETED | OUTPATIENT
Start: 2019-09-11 | End: 2019-09-11

## 2019-09-11 RX ORDER — MORPHINE SULFATE 50 MG/1
2 CAPSULE, EXTENDED RELEASE ORAL ONCE
Refills: 0 | Status: DISCONTINUED | OUTPATIENT
Start: 2019-09-11 | End: 2019-09-11

## 2019-09-11 RX ORDER — IBUPROFEN 200 MG
1 TABLET ORAL
Qty: 28 | Refills: 0
Start: 2019-09-11 | End: 2019-09-17

## 2019-09-11 RX ORDER — ONDANSETRON 8 MG/1
1 TABLET, FILM COATED ORAL
Qty: 15 | Refills: 0
Start: 2019-09-11 | End: 2019-09-15

## 2019-09-11 RX ORDER — CHOLECALCIFEROL (VITAMIN D3) 125 MCG
1 CAPSULE ORAL
Qty: 0 | Refills: 0 | DISCHARGE

## 2019-09-11 RX ORDER — AMITRIPTYLINE HCL 25 MG
25 TABLET ORAL ONCE
Refills: 0 | Status: COMPLETED | OUTPATIENT
Start: 2019-09-11 | End: 2019-09-11

## 2019-09-11 RX ORDER — KETOROLAC TROMETHAMINE 30 MG/ML
15 SYRINGE (ML) INJECTION ONCE
Refills: 0 | Status: DISCONTINUED | OUTPATIENT
Start: 2019-09-11 | End: 2019-09-11

## 2019-09-11 RX ADMIN — OXYCODONE AND ACETAMINOPHEN 1 TABLET(S): 5; 325 TABLET ORAL at 08:50

## 2019-09-11 RX ADMIN — Medication 15 MILLIGRAM(S): at 05:42

## 2019-09-11 RX ADMIN — SODIUM CHLORIDE 1000 MILLILITER(S): 9 INJECTION INTRAMUSCULAR; INTRAVENOUS; SUBCUTANEOUS at 03:29

## 2019-09-11 RX ADMIN — SODIUM CHLORIDE 1000 MILLILITER(S): 9 INJECTION INTRAMUSCULAR; INTRAVENOUS; SUBCUTANEOUS at 02:52

## 2019-09-11 RX ADMIN — LISINOPRIL 20 MILLIGRAM(S): 2.5 TABLET ORAL at 12:12

## 2019-09-11 RX ADMIN — Medication 300 MILLIGRAM(S): at 12:13

## 2019-09-11 RX ADMIN — Medication 600 MILLIGRAM(S): at 10:36

## 2019-09-11 RX ADMIN — Medication 15 MILLIGRAM(S): at 06:15

## 2019-09-11 RX ADMIN — AMLODIPINE BESYLATE 5 MILLIGRAM(S): 2.5 TABLET ORAL at 12:12

## 2019-09-11 RX ADMIN — Medication 600 MILLIGRAM(S): at 08:50

## 2019-09-11 RX ADMIN — MORPHINE SULFATE 2 MILLIGRAM(S): 50 CAPSULE, EXTENDED RELEASE ORAL at 08:49

## 2019-09-11 RX ADMIN — Medication 40 MILLIGRAM(S): at 12:12

## 2019-09-11 RX ADMIN — ONDANSETRON 4 MILLIGRAM(S): 8 TABLET, FILM COATED ORAL at 13:07

## 2019-09-11 RX ADMIN — MORPHINE SULFATE 2 MILLIGRAM(S): 50 CAPSULE, EXTENDED RELEASE ORAL at 06:34

## 2019-09-11 RX ADMIN — OXYCODONE AND ACETAMINOPHEN 1 TABLET(S): 5; 325 TABLET ORAL at 10:37

## 2019-09-11 NOTE — ED PROVIDER NOTE - PROGRESS NOTE DETAILS
Pt signed out by Dr. Alcala as pending CT and PT eval. CT negative, PT evaluation determines pt safe for d/c home, pt treated and given pain relief and is feeling better, ready for d/c.

## 2019-09-11 NOTE — ED PROVIDER NOTE - OBJECTIVE STATEMENT
Pertinent PMH/PSH/FHx/SHx and Review of Systems contained within:  89 yo f with pmh of dm and htn presents in ED c/o sudden right knee pain without any known injury. patient reports unable to bear weight. pain worsens with bending knee. No fever/chills, No photophobia/eye pain/changes in vision, No ear pain/sore throat/dysphagia, No chest pain/palpitations, no SOB/cough/wheeze/stridor, No abdominal pain, No N/V/D, no dysuria/frequency/discharge, No neck/back pain, no rash, no changes in neurological status/function.

## 2019-09-11 NOTE — ED ADULT NURSE REASSESSMENT NOTE - NSIMPLEMENTINTERV_GEN_ALL_ED
Implemented All Fall with Harm Risk Interventions:  [place] to call system. Call bell, personal items and telephone within reach. Instruct patient to call for assistance. Room bathroom lighting operational. Non-slip footwear when patient is off stretcher. Physically safe environment: no spills, clutter or unnecessary equipment. Stretcher in lowest position, wheels locked, appropriate side rails in place. Provide visual cue, wrist band, yellow gown, etc. Monitor gait and stability. Monitor for mental status changes and reorient to person, place, and time. Review medications for side effects contributing to fall risk. Reinforce activity limits and safety measures with patient and family. Provide visual clues: red socks.

## 2019-09-11 NOTE — ED ADULT NURSE NOTE - OBJECTIVE STATEMENT
Patient reports "I had my leg up on recliner and tried to get up and I couldn't walk because of pain." Patient indicates right leg.

## 2019-09-11 NOTE — PHYSICAL THERAPY INITIAL EVALUATION ADULT - PLANNED THERAPY INTERVENTIONS, PT EVAL
Pt will independently perform sit to/from stand transfers without LOB using rolling walker by 2-3 days Pt will increase static/dynamic standing balance to WFL to perform all functional mobility without LOB, by 2 weeks Pt will independently ambulate 200feet with rolling walker without loss of balance, by 2-3weeks, Pt will increase B knee strength to 4/5 to improve mobility by 30 days

## 2019-09-11 NOTE — ED PROVIDER NOTE - PATIENT PORTAL LINK FT
You can access the FollowMyHealth Patient Portal offered by [facility] by registering at the following website: [email or web address]. By joining [employer]’s FollowMyHealth portal, you will also be able to view your health information using other applications (apps) compatible with our system.

## 2019-09-11 NOTE — PHYSICAL THERAPY INITIAL EVALUATION ADULT - PERTINENT HX OF CURRENT PROBLEM, REHAB EVAL
Pt came to ED secondary to pain and difficulty walking. Pain on RLE general distal knee and calf. Pt with baseline hypertension. At baseline /77 and post activity increased to 208/84. RN and MD made aware. Pt asymptomatic with no reports of headache. Pain on RLE 4/10. Pt given pain medicine but vomited

## 2019-09-11 NOTE — PHYSICAL THERAPY INITIAL EVALUATION ADULT - ADDITIONAL COMMENTS
Pt lives in a private house with 2 steps to enter. Pt's daughter lives upstairs. Pt ambulates without AD holding on to wall, but owns a rollator. Pt's daughter assists with community activities and ambulation. Pt I with ADLs.

## 2019-09-11 NOTE — ED ADULT NURSE NOTE - NSIMPLEMENTINTERV_GEN_ALL_ED
Implemented All Fall with Harm Risk Interventions:  [place] to call system. Call bell, personal items and telephone within reach. Instruct patient to call for assistance. Room bathroom lighting operational. Non-slip footwear when patient is off stretcher. Physically safe environment: no spills, clutter or unnecessary equipment. Stretcher in lowest position, wheels locked, appropriate side rails in place. Provide visual cue, wrist band, yellow gown, etc. Monitor gait and stability. Monitor for mental status changes and reorient to person, place, and time. Review medications for side effects contributing to fall risk. Reinforce activity limits and safety measures with patient and family. Provide visual clues: red socks.

## 2019-09-11 NOTE — ED PROVIDER NOTE - CLINICAL SUMMARY MEDICAL DECISION MAKING FREE TEXT BOX
pending ct. Patient care transitioned to incoming team.  All decisions regarding the progression of care will be made at their discretion.

## 2019-09-11 NOTE — ED PROVIDER NOTE - MUSCULOSKELETAL, MLM
range of motion is not limited, no muscle or joint tenderness, right knee tenderness with rom, neurovascularly intact

## 2019-09-11 NOTE — PHYSICAL THERAPY INITIAL EVALUATION ADULT - GENERAL OBSERVATIONS, REHAB EVAL
Pt encountered supine on stretcher in NAD. Pt not evaluated at this time secondary to elevated BP at baseline. 221/111 and 206/100 at rest. Spoke with MD. Pt to be assessed when BP is within normal range
Pt encountered supine on stretcher in NAD, cardiac monitor intact

## 2019-09-23 ENCOUNTER — APPOINTMENT (OUTPATIENT)
Dept: INTERNAL MEDICINE | Facility: CLINIC | Age: 84
End: 2019-09-23
Payer: MEDICARE

## 2019-09-23 DIAGNOSIS — M19.90 UNSPECIFIED OSTEOARTHRITIS, UNSPECIFIED SITE: ICD-10-CM

## 2019-09-23 PROCEDURE — 99214 OFFICE O/P EST MOD 30 MIN: CPT | Mod: 25

## 2019-09-23 PROCEDURE — G0008: CPT

## 2019-09-23 PROCEDURE — 90662 IIV NO PRSV INCREASED AG IM: CPT

## 2019-09-23 NOTE — PHYSICAL EXAM
[No Acute Distress] : no acute distress [Well Nourished] : well nourished [Well-Appearing] : well-appearing [Well Developed] : well developed [Normal Sclera/Conjunctiva] : normal sclera/conjunctiva [PERRL] : pupils equal round and reactive to light [EOMI] : extraocular movements intact [Normal Outer Ear/Nose] : the outer ears and nose were normal in appearance [Normal Oropharynx] : the oropharynx was normal [No JVD] : no jugular venous distention [No Lymphadenopathy] : no lymphadenopathy [Supple] : supple [Thyroid Normal, No Nodules] : the thyroid was normal and there were no nodules present [No Respiratory Distress] : no respiratory distress  [No Accessory Muscle Use] : no accessory muscle use [Normal Rate] : normal rate  [Clear to Auscultation] : lungs were clear to auscultation bilaterally [Regular Rhythm] : with a regular rhythm [Normal S1, S2] : normal S1 and S2 [No Murmur] : no murmur heard [No Carotid Bruits] : no carotid bruits [No Abdominal Bruit] : a ~M bruit was not heard ~T in the abdomen [No Varicosities] : no varicosities [Pedal Pulses Present] : the pedal pulses are present [No Edema] : there was no peripheral edema [No Palpable Aorta] : no palpable aorta [No Extremity Clubbing/Cyanosis] : no extremity clubbing/cyanosis [Soft] : abdomen soft [Non Tender] : non-tender [Non-distended] : non-distended [No Masses] : no abdominal mass palpated [No HSM] : no HSM [Normal Bowel Sounds] : normal bowel sounds [Normal Posterior Cervical Nodes] : no posterior cervical lymphadenopathy [Normal Anterior Cervical Nodes] : no anterior cervical lymphadenopathy [No CVA Tenderness] : no CVA  tenderness [No Spinal Tenderness] : no spinal tenderness [Grossly Normal Strength/Tone] : grossly normal strength/tone [No Joint Swelling] : no joint swelling [No Rash] : no rash [Coordination Grossly Intact] : coordination grossly intact [No Focal Deficits] : no focal deficits [Normal Gait] : normal gait [Deep Tendon Reflexes (DTR)] : deep tendon reflexes were 2+ and symmetric [Normal Affect] : the affect was normal [Normal Insight/Judgement] : insight and judgment were intact

## 2019-09-23 NOTE — HISTORY OF PRESENT ILLNESS
[FreeTextEntry8] : Patient is here with severe pain in the  right knee.\par Not hot or swollen \par ER  eval   [facility]   \par Hgb 10.5\par WBC wnl\par Cre 1.9\par \par Given ibuprofen 600  4x\par \par xray not remarkable \par No DVT on US\par CT knee   DJD\par \par Rx Percocet

## 2019-10-02 ENCOUNTER — APPOINTMENT (OUTPATIENT)
Dept: INTERNAL MEDICINE | Facility: CLINIC | Age: 84
End: 2019-10-02

## 2019-10-10 ENCOUNTER — APPOINTMENT (OUTPATIENT)
Dept: INTERNAL MEDICINE | Facility: CLINIC | Age: 84
End: 2019-10-10
Payer: MEDICARE

## 2019-10-10 LAB
ALBUMIN SERPL ELPH-MCNC: 3.7 G/DL
ALP BLD-CCNC: 98 U/L
ALT SERPL-CCNC: 18 U/L
ANION GAP SERPL CALC-SCNC: 13 MMOL/L
AST SERPL-CCNC: 19 U/L
BASOPHILS # BLD AUTO: 0.08 K/UL
BASOPHILS NFR BLD AUTO: 0.8 %
BILIRUB SERPL-MCNC: 0.4 MG/DL
BUN SERPL-MCNC: 43 MG/DL
CALCIUM SERPL-MCNC: 8.2 MG/DL
CHLORIDE SERPL-SCNC: 109 MMOL/L
CO2 SERPL-SCNC: 20 MMOL/L
CREAT SERPL-MCNC: 1.83 MG/DL
EOSINOPHIL # BLD AUTO: 0.6 K/UL
EOSINOPHIL NFR BLD AUTO: 5.9 %
FERRITIN SERPL-MCNC: 173 NG/ML
FOLATE SERPL-MCNC: >20 NG/ML
GLUCOSE SERPL-MCNC: 110 MG/DL
HCT VFR BLD CALC: 32.1 %
HGB BLD-MCNC: 10.2 G/DL
IMM GRANULOCYTES NFR BLD AUTO: 0.5 %
IRON SATN MFR SERPL: 11 %
IRON SERPL-MCNC: 25 UG/DL
LYMPHOCYTES # BLD AUTO: 1.5 K/UL
LYMPHOCYTES NFR BLD AUTO: 14.7 %
MAN DIFF?: NORMAL
MCHC RBC-ENTMCNC: 30.4 PG
MCHC RBC-ENTMCNC: 31.8 GM/DL
MCV RBC AUTO: 95.5 FL
MONOCYTES # BLD AUTO: 0.66 K/UL
MONOCYTES NFR BLD AUTO: 6.5 %
NEUTROPHILS # BLD AUTO: 7.31 K/UL
NEUTROPHILS NFR BLD AUTO: 71.6 %
PLATELET # BLD AUTO: 330 K/UL
POTASSIUM SERPL-SCNC: 4.7 MMOL/L
PROT SERPL-MCNC: 6.1 G/DL
RBC # BLD: 3.36 M/UL
RBC # BLD: 3.36 M/UL
RBC # FLD: 16.2 %
RETICS # AUTO: 1.2 %
RETICS AGGREG/RBC NFR: 41.3 K/UL
SODIUM SERPL-SCNC: 142 MMOL/L
TIBC SERPL-MCNC: 235 UG/DL
UIBC SERPL-MCNC: 210 UG/DL
VIT B12 SERPL-MCNC: 518 PG/ML
WBC # FLD AUTO: 10.2 K/UL

## 2019-10-10 PROCEDURE — 36415 COLL VENOUS BLD VENIPUNCTURE: CPT

## 2019-10-17 ENCOUNTER — APPOINTMENT (OUTPATIENT)
Dept: INTERNAL MEDICINE | Facility: CLINIC | Age: 84
End: 2019-10-17
Payer: MEDICARE

## 2019-10-17 VITALS
WEIGHT: 180 LBS | HEART RATE: 73 BPM | HEIGHT: 63 IN | SYSTOLIC BLOOD PRESSURE: 144 MMHG | OXYGEN SATURATION: 97 % | DIASTOLIC BLOOD PRESSURE: 76 MMHG | BODY MASS INDEX: 31.89 KG/M2

## 2019-10-17 PROCEDURE — 99214 OFFICE O/P EST MOD 30 MIN: CPT

## 2019-10-17 RX ORDER — OXYCODONE AND ACETAMINOPHEN 5; 325 MG/1; MG/1
5-325 TABLET ORAL
Qty: 20 | Refills: 0 | Status: DISCONTINUED | COMMUNITY
Start: 2019-09-23 | End: 2019-10-17

## 2019-10-17 RX ORDER — METHYLPREDNISOLONE 4 MG/1
4 TABLET ORAL
Qty: 1 | Refills: 1 | Status: DISCONTINUED | COMMUNITY
Start: 2019-09-23 | End: 2019-10-17

## 2019-10-17 RX ORDER — ATORVASTATIN CALCIUM 80 MG/1
80 TABLET, FILM COATED ORAL
Refills: 0 | Status: DISCONTINUED | COMMUNITY
End: 2019-10-17

## 2019-10-17 RX ORDER — MONTELUKAST 10 MG/1
10 TABLET, FILM COATED ORAL DAILY
Refills: 0 | Status: DISCONTINUED | COMMUNITY
End: 2019-10-17

## 2019-10-17 RX ORDER — OXYCODONE AND ACETAMINOPHEN 5; 325 MG/1; MG/1
5-325 TABLET ORAL
Qty: 30 | Refills: 0 | Status: DISCONTINUED | COMMUNITY
Start: 2019-09-23 | End: 2019-10-17

## 2019-10-17 NOTE — PHYSICAL EXAM
[No Acute Distress] : no acute distress [Well Nourished] : well nourished [Well Developed] : well developed [Well-Appearing] : well-appearing [Normal Sclera/Conjunctiva] : normal sclera/conjunctiva [PERRL] : pupils equal round and reactive to light [EOMI] : extraocular movements intact [Normal Outer Ear/Nose] : the outer ears and nose were normal in appearance [Normal Oropharynx] : the oropharynx was normal [No JVD] : no jugular venous distention [No Lymphadenopathy] : no lymphadenopathy [Supple] : supple [Thyroid Normal, No Nodules] : the thyroid was normal and there were no nodules present [No Respiratory Distress] : no respiratory distress  [No Accessory Muscle Use] : no accessory muscle use [Normal Rate] : normal rate  [Clear to Auscultation] : lungs were clear to auscultation bilaterally [Regular Rhythm] : with a regular rhythm [No Murmur] : no murmur heard [Normal S1, S2] : normal S1 and S2 [No Carotid Bruits] : no carotid bruits [No Abdominal Bruit] : a ~M bruit was not heard ~T in the abdomen [No Varicosities] : no varicosities [Pedal Pulses Present] : the pedal pulses are present [No Edema] : there was no peripheral edema [No Palpable Aorta] : no palpable aorta [No Extremity Clubbing/Cyanosis] : no extremity clubbing/cyanosis [Soft] : abdomen soft [Non Tender] : non-tender [Non-distended] : non-distended [No Masses] : no abdominal mass palpated [No HSM] : no HSM [Normal Bowel Sounds] : normal bowel sounds [Normal Posterior Cervical Nodes] : no posterior cervical lymphadenopathy [Normal Anterior Cervical Nodes] : no anterior cervical lymphadenopathy [No CVA Tenderness] : no CVA  tenderness [No Spinal Tenderness] : no spinal tenderness [No Joint Swelling] : no joint swelling [Grossly Normal Strength/Tone] : grossly normal strength/tone [Coordination Grossly Intact] : coordination grossly intact [No Rash] : no rash [No Focal Deficits] : no focal deficits [Normal Gait] : normal gait [Deep Tendon Reflexes (DTR)] : deep tendon reflexes were 2+ and symmetric [Normal Insight/Judgement] : insight and judgment were intact [Normal Affect] : the affect was normal

## 2019-10-17 NOTE — ASSESSMENT
[FreeTextEntry1] : Creat 1.8\par Hgb 10\par \par Suspect the renal insufficiency contributing to the current symptom complex and interfering with her feelings of well being\par \par Clonidine was stopped in recent weeks / months to try to deminish tiredness / fatigue... with questionable effect\par \par Has not seen nephrologists in quite some time\par \par Contribution of the daily nocturnal yadiel and vodka to the trouble with nausea?  \par \par

## 2019-10-17 NOTE — HISTORY OF PRESENT ILLNESS
[FreeTextEntry1] : F/U \par AM nausea\par Malaise\par Loss of apetite [de-identified] : Home BP readings:  aceptable\par \par Adherent to meds as prescribed\par No weight change\par

## 2019-10-18 ENCOUNTER — APPOINTMENT (OUTPATIENT)
Dept: NEPHROLOGY | Facility: CLINIC | Age: 84
End: 2019-10-18
Payer: MEDICARE

## 2019-10-18 VITALS
HEART RATE: 67 BPM | SYSTOLIC BLOOD PRESSURE: 142 MMHG | BODY MASS INDEX: 31.89 KG/M2 | OXYGEN SATURATION: 96 % | DIASTOLIC BLOOD PRESSURE: 58 MMHG | WEIGHT: 180 LBS

## 2019-10-18 PROCEDURE — 99214 OFFICE O/P EST MOD 30 MIN: CPT

## 2019-10-18 RX ORDER — OMEPRAZOLE 20 MG/1
20 CAPSULE, DELAYED RELEASE ORAL
Qty: 90 | Refills: 3 | Status: DISCONTINUED | COMMUNITY
End: 2019-10-18

## 2019-10-18 NOTE — PHYSICAL EXAM
[General Appearance - Alert] : alert [General Appearance - In No Acute Distress] : in no acute distress [General Appearance - Well Nourished] : well nourished [General Appearance - Well Developed] : well developed [Sclera] : the sclera and conjunctiva were normal [Oropharynx] : the oropharynx was normal [Outer Ear] : the ears and nose were normal in appearance [Neck Appearance] : the appearance of the neck was normal [Neck Cervical Mass (___cm)] : no neck mass was observed [Jugular Venous Distention Increased] : there was no jugular-venous distention [Heart Sounds] : normal S1 and S2 [Auscultation Breath Sounds / Voice Sounds] : lungs were clear to auscultation bilaterally [Respiration, Rhythm And Depth] : normal respiratory rhythm and effort [Exaggerated Use Of Accessory Muscles For Inspiration] : no accessory muscle use [Heart Sounds Pericardial Friction Rub] : no pericardial rub [Heart Sounds Gallop] : no gallops [Murmurs] : no murmurs [Edema] : there was no peripheral edema [Abdomen Soft] : soft [] : no hepato-splenomegaly [Abdomen Tenderness] : non-tender [Abdomen Mass (___ Cm)] : no abdominal mass palpated [Cervical Lymph Nodes Enlarged Posterior Bilaterally] : posterior cervical [Cervical Lymph Nodes Enlarged Anterior Bilaterally] : anterior cervical [Supraclavicular Lymph Nodes Enlarged Bilaterally] : supraclavicular [No Spinal Tenderness] : no spinal tenderness [No CVA Tenderness] : no ~M costovertebral angle tenderness [Motor Exam] : the motor exam was normal [Abnormal Walk] : normal gait [Cranial Nerves] : cranial nerves 2-12 were intact [No Focal Deficits] : no focal deficits [Oriented To Time, Place, And Person] : oriented to person, place, and time [Impaired Insight] : insight and judgment were intact [Mood] : the mood was normal [Affect] : the affect was normal

## 2019-10-18 NOTE — REVIEW OF SYSTEMS
[Chills] : no chills [Fever] : no fever [Feeling Poorly] : feeling poorly [Feeling Tired] : feeling tired [Eyesight Problems] : no eyesight problems [Chest Pain] : no chest pain [Nosebleeds] : no nosebleeds [Palpitations] : no palpitations [Lower Ext Edema] : no lower extremity edema [Shortness Of Breath] : no shortness of breath [Wheezing] : no wheezing [Abdominal Pain] : no abdominal pain [Diarrhea] : diarrhea [Incontinence] : no incontinence [Dysuria] : no dysuria [Joint Pain] : no joint pain [Arthralgias] : no arthralgias [Dizziness] : no dizziness [Fainting] : no fainting [Easy Bleeding] : no tendency for easy bleeding [Easy Bruising] : no tendency for easy bruising

## 2019-10-18 NOTE — ASSESSMENT
[FreeTextEntry1] : Ms Morena Zuniga is a 89 years old woman with a history of hypertension seen for hospital follow up.\par \par Chronic Kidney Disease Stage III -- Etiology is possible from DM with HTN and age-related GFR decline.  Ultrasound findings consistent with CKD last May.  I suspect that there is a component of hypovolemia here given the above history.  I have recommended the following changes: double norvasc to 10, decrease furosemide to QOD, decrease benazepril to 10.  Monitor BP daily and notify me for readings > 180 / 100.  Repeat blood work next week.  The patient should come off of metformin for now.  She will see me again in a few months or sooner if needed.  She has no asterixis, no metallic taste in the mouth,  I do not suspect that her decreased appetite is related to renal dysfunction.\par \par Essential Hypertension -- The patient feels well and her blood pressure is acceptable with a goal of systolic approximately 150.   QIANA was not noted on ultrasound in the hospital but study was limited.  She does not have stigmata of hypercortisolism nor does she have symptoms consistent with pheochromocytoma.  Plasma cheyanne was 7.9 and direct plasma renin was < 2.0.  I asked her to take extra labetalol if sbp 180 and to hold if sbp or dbp < 120 or 60 respectively.  She will follow up with me.

## 2019-10-18 NOTE — END OF VISIT
[Time Spent: ___ minutes] : I have spent [unfilled] minutes of face to face time with the patient
Partially impaired: cannot see medication labels or newsprint, but can see obstacles in path, and the surrounding layout; can count fingers at arm's length

## 2019-10-18 NOTE — HISTORY OF PRESENT ILLNESS
[FreeTextEntry1] : Today I had the pleasure of seeing Ms. Morena Zuniga who is a 89 year old retried .  I initially met her in the hospital at [facility] earlier this month where she was admitted with hypertensive urgency.  At that time we changed her carvedilol to labetalol.  She was quickly readmitted because her BP dropped and she had mild FLETCHER which quickly resolved.  \par \par 10/18/19 -- Morena is here to see me again today after about two years.  For the last few months she has been having loose watery stools and decreased appetite.  Since last year around november her creatinine has been elevated.  She has lost weight, her legs feels skinny, she has not been short of breath.

## 2019-10-25 ENCOUNTER — LABORATORY RESULT (OUTPATIENT)
Age: 84
End: 2019-10-25

## 2019-10-25 ENCOUNTER — APPOINTMENT (OUTPATIENT)
Dept: INTERNAL MEDICINE | Facility: CLINIC | Age: 84
End: 2019-10-25
Payer: MEDICARE

## 2019-10-25 LAB
ALBUMIN SERPL ELPH-MCNC: 3.8 G/DL
ALP BLD-CCNC: 84 U/L
ALT SERPL-CCNC: 16 U/L
ANION GAP SERPL CALC-SCNC: 15 MMOL/L
AST SERPL-CCNC: 18 U/L
BASOPHILS # BLD AUTO: 0.05 K/UL
BASOPHILS NFR BLD AUTO: 0.5 %
BILIRUB SERPL-MCNC: 0.3 MG/DL
BUN SERPL-MCNC: 34 MG/DL
CALCIUM SERPL-MCNC: 8.7 MG/DL
CHLORIDE SERPL-SCNC: 107 MMOL/L
CO2 SERPL-SCNC: 20 MMOL/L
CREAT SERPL-MCNC: 1.83 MG/DL
EOSINOPHIL # BLD AUTO: 0.55 K/UL
EOSINOPHIL NFR BLD AUTO: 6 %
GLUCOSE SERPL-MCNC: 102 MG/DL
HCT VFR BLD CALC: 33 %
HGB BLD-MCNC: 10.4 G/DL
IMM GRANULOCYTES NFR BLD AUTO: 0.7 %
LYMPHOCYTES # BLD AUTO: 1.84 K/UL
LYMPHOCYTES NFR BLD AUTO: 19.9 %
MAN DIFF?: NORMAL
MCHC RBC-ENTMCNC: 30.1 PG
MCHC RBC-ENTMCNC: 31.5 GM/DL
MCV RBC AUTO: 95.7 FL
MONOCYTES # BLD AUTO: 0.66 K/UL
MONOCYTES NFR BLD AUTO: 7.2 %
NEUTROPHILS # BLD AUTO: 6.07 K/UL
NEUTROPHILS NFR BLD AUTO: 65.7 %
PLATELET # BLD AUTO: 369 K/UL
POTASSIUM SERPL-SCNC: 5.2 MMOL/L
PROT SERPL-MCNC: 6.3 G/DL
RBC # BLD: 3.45 M/UL
RBC # FLD: 16.2 %
SODIUM SERPL-SCNC: 142 MMOL/L
WBC # FLD AUTO: 9.23 K/UL

## 2019-10-25 PROCEDURE — 36415 COLL VENOUS BLD VENIPUNCTURE: CPT

## 2019-10-30 ENCOUNTER — MEDICATION RENEWAL (OUTPATIENT)
Age: 84
End: 2019-10-30

## 2019-11-05 ENCOUNTER — MEDICATION RENEWAL (OUTPATIENT)
Age: 84
End: 2019-11-05

## 2019-11-11 ENCOUNTER — MEDICATION RENEWAL (OUTPATIENT)
Age: 84
End: 2019-11-11

## 2019-11-18 ENCOUNTER — APPOINTMENT (OUTPATIENT)
Dept: INTERNAL MEDICINE | Facility: CLINIC | Age: 84
End: 2019-11-18
Payer: MEDICARE

## 2019-11-18 VITALS
RESPIRATION RATE: 16 BRPM | SYSTOLIC BLOOD PRESSURE: 137 MMHG | WEIGHT: 186 LBS | BODY MASS INDEX: 32.96 KG/M2 | HEART RATE: 71 BPM | HEIGHT: 63 IN | DIASTOLIC BLOOD PRESSURE: 64 MMHG | OXYGEN SATURATION: 96 %

## 2019-11-18 DIAGNOSIS — H34.12 CENTRAL RETINAL ARTERY OCCLUSION, LEFT EYE: ICD-10-CM

## 2019-11-18 DIAGNOSIS — J45.909 UNSPECIFIED ASTHMA, UNCOMPLICATED: ICD-10-CM

## 2019-11-18 DIAGNOSIS — K21.9 GASTRO-ESOPHAGEAL REFLUX DISEASE W/OUT ESOPHAGITIS: ICD-10-CM

## 2019-11-18 PROCEDURE — 99214 OFFICE O/P EST MOD 30 MIN: CPT | Mod: 25

## 2019-11-18 PROCEDURE — 36415 COLL VENOUS BLD VENIPUNCTURE: CPT

## 2019-11-18 NOTE — HISTORY OF PRESENT ILLNESS
[Diabetes Mellitus] : Diabetes Mellitus [Hyperlipidemia] : Hyperlipidemia [Other: ___] : [unfilled] [Hypertension] : Hypertension [FreeTextEntry6] : Reports decline in appetite\par    no appreciable weight loss\par Cough and wheeze mild and episodic\par \par  [Nephropathy] : nephropathy [No episodes] : No hypoglycemic episodes since the last visit. [de-identified] : Adherent to meds\par No apparent side effects [de-identified] : Adherent to meds\par No apparent side effects [Managed with medications] : managed with  medication [Moderate Intensity Therapy] : Patient is currently on moderate intensity statin  therapy

## 2019-11-19 LAB
ALBUMIN SERPL ELPH-MCNC: 4 G/DL
ALP BLD-CCNC: 74 U/L
ALT SERPL-CCNC: 18 U/L
ANION GAP SERPL CALC-SCNC: 16 MMOL/L
AST SERPL-CCNC: 16 U/L
BASOPHILS # BLD AUTO: 0.06 K/UL
BASOPHILS NFR BLD AUTO: 0.6 %
BILIRUB SERPL-MCNC: 0.4 MG/DL
BUN SERPL-MCNC: 48 MG/DL
CALCIUM SERPL-MCNC: 9 MG/DL
CHLORIDE SERPL-SCNC: 105 MMOL/L
CHOLEST SERPL-MCNC: 131 MG/DL
CHOLEST/HDLC SERPL: 2.4 RATIO
CK SERPL-CCNC: 63 U/L
CO2 SERPL-SCNC: 23 MMOL/L
CREAT SERPL-MCNC: 2.52 MG/DL
EOSINOPHIL # BLD AUTO: 0.68 K/UL
EOSINOPHIL NFR BLD AUTO: 7.2 %
ESTIMATED AVERAGE GLUCOSE: 140 MG/DL
FERRITIN SERPL-MCNC: 83 NG/ML
FOLATE SERPL-MCNC: >20 NG/ML
GGT SERPL-CCNC: 20 U/L
GLUCOSE SERPL-MCNC: 114 MG/DL
HBA1C MFR BLD HPLC: 6.5 %
HCT VFR BLD CALC: 33.4 %
HDLC SERPL-MCNC: 54 MG/DL
HGB BLD-MCNC: 10.3 G/DL
IMM GRANULOCYTES NFR BLD AUTO: 0.4 %
IRON SATN MFR SERPL: 30 %
IRON SERPL-MCNC: 87 UG/DL
LDLC SERPL CALC-MCNC: 64 MG/DL
LYMPHOCYTES # BLD AUTO: 1.57 K/UL
LYMPHOCYTES NFR BLD AUTO: 16.7 %
MAN DIFF?: NORMAL
MCHC RBC-ENTMCNC: 30.1 PG
MCHC RBC-ENTMCNC: 30.8 GM/DL
MCV RBC AUTO: 97.7 FL
MONOCYTES # BLD AUTO: 0.65 K/UL
MONOCYTES NFR BLD AUTO: 6.9 %
NEUTROPHILS # BLD AUTO: 6.38 K/UL
NEUTROPHILS NFR BLD AUTO: 68.2 %
PLATELET # BLD AUTO: 274 K/UL
POTASSIUM SERPL-SCNC: 4.7 MMOL/L
PROT SERPL-MCNC: 6.3 G/DL
RBC # BLD: 3.42 M/UL
RBC # BLD: 3.42 M/UL
RBC # FLD: 16.4 %
RETICS # AUTO: 1.5 %
RETICS AGGREG/RBC NFR: 49.9 K/UL
SODIUM SERPL-SCNC: 144 MMOL/L
T4 FREE SERPL-MCNC: 1.2 NG/DL
TIBC SERPL-MCNC: 286 UG/DL
TRIGL SERPL-MCNC: 65 MG/DL
TSH SERPL-ACNC: 2.38 UIU/ML
UIBC SERPL-MCNC: 199 UG/DL
VIT B12 SERPL-MCNC: 311 PG/ML
WBC # FLD AUTO: 9.38 K/UL

## 2019-12-05 ENCOUNTER — MEDICATION RENEWAL (OUTPATIENT)
Age: 84
End: 2019-12-05

## 2019-12-11 ENCOUNTER — MEDICATION RENEWAL (OUTPATIENT)
Age: 84
End: 2019-12-11

## 2020-01-27 ENCOUNTER — LABORATORY RESULT (OUTPATIENT)
Age: 85
End: 2020-01-27

## 2020-01-28 ENCOUNTER — LABORATORY RESULT (OUTPATIENT)
Age: 85
End: 2020-01-28

## 2020-01-30 ENCOUNTER — APPOINTMENT (OUTPATIENT)
Dept: INTERNAL MEDICINE | Facility: CLINIC | Age: 85
End: 2020-01-30
Payer: MEDICARE

## 2020-01-30 VITALS
WEIGHT: 186 LBS | DIASTOLIC BLOOD PRESSURE: 80 MMHG | BODY MASS INDEX: 32.95 KG/M2 | HEART RATE: 70 BPM | SYSTOLIC BLOOD PRESSURE: 130 MMHG

## 2020-01-30 DIAGNOSIS — R79.89 OTHER SPECIFIED ABNORMAL FINDINGS OF BLOOD CHEMISTRY: ICD-10-CM

## 2020-01-30 PROCEDURE — 99214 OFFICE O/P EST MOD 30 MIN: CPT

## 2020-01-30 NOTE — ASSESSMENT
[FreeTextEntry1] : Deteriorating renal function\par \par Off PPI several months\par To stop metformin \par \par To see renal \par R V 6w

## 2020-01-30 NOTE — PHYSICAL EXAM
[Well Developed] : well developed [No Acute Distress] : no acute distress [Well Nourished] : well nourished [Normal Sclera/Conjunctiva] : normal sclera/conjunctiva [Well-Appearing] : well-appearing [PERRL] : pupils equal round and reactive to light [EOMI] : extraocular movements intact [Normal Outer Ear/Nose] : the outer ears and nose were normal in appearance [Normal Oropharynx] : the oropharynx was normal [No JVD] : no jugular venous distention [No Lymphadenopathy] : no lymphadenopathy [Supple] : supple [Thyroid Normal, No Nodules] : the thyroid was normal and there were no nodules present [No Respiratory Distress] : no respiratory distress  [Clear to Auscultation] : lungs were clear to auscultation bilaterally [No Accessory Muscle Use] : no accessory muscle use [Regular Rhythm] : with a regular rhythm [Normal Rate] : normal rate  [No Murmur] : no murmur heard [Normal S1, S2] : normal S1 and S2 [No Carotid Bruits] : no carotid bruits [No Abdominal Bruit] : a ~M bruit was not heard ~T in the abdomen [No Varicosities] : no varicosities [No Palpable Aorta] : no palpable aorta [No Edema] : there was no peripheral edema [Pedal Pulses Present] : the pedal pulses are present [No Extremity Clubbing/Cyanosis] : no extremity clubbing/cyanosis [Soft] : abdomen soft [Non Tender] : non-tender [Non-distended] : non-distended [No Masses] : no abdominal mass palpated [No HSM] : no HSM [Normal Bowel Sounds] : normal bowel sounds [Normal Posterior Cervical Nodes] : no posterior cervical lymphadenopathy [Normal Anterior Cervical Nodes] : no anterior cervical lymphadenopathy [No CVA Tenderness] : no CVA  tenderness [No Spinal Tenderness] : no spinal tenderness [Grossly Normal Strength/Tone] : grossly normal strength/tone [No Joint Swelling] : no joint swelling [Coordination Grossly Intact] : coordination grossly intact [No Rash] : no rash [No Focal Deficits] : no focal deficits [Normal Gait] : normal gait [Deep Tendon Reflexes (DTR)] : deep tendon reflexes were 2+ and symmetric [Normal Insight/Judgement] : insight and judgment were intact [Normal Affect] : the affect was normal

## 2020-01-30 NOTE — HISTORY OF PRESENT ILLNESS
[FreeTextEntry1] : F/U [de-identified] : Cre 2.78\par Hgb 10.4  \par K 4.5\par HCO3 24 \par      done in conjunction with today's visit  \par Feeling weak with decreased stamina

## 2020-01-31 ENCOUNTER — APPOINTMENT (OUTPATIENT)
Dept: NEPHROLOGY | Facility: CLINIC | Age: 85
End: 2020-01-31
Payer: MEDICARE

## 2020-01-31 VITALS
SYSTOLIC BLOOD PRESSURE: 157 MMHG | HEART RATE: 83 BPM | WEIGHT: 185 LBS | DIASTOLIC BLOOD PRESSURE: 74 MMHG | OXYGEN SATURATION: 98 % | HEIGHT: 63 IN | BODY MASS INDEX: 32.78 KG/M2

## 2020-01-31 PROCEDURE — 99214 OFFICE O/P EST MOD 30 MIN: CPT

## 2020-01-31 NOTE — HISTORY OF PRESENT ILLNESS
[FreeTextEntry1] : Today I had the pleasure of seeing Ms. Morena Zuniga who is a 89 year old retried .  I initially met her in the hospital at [facility] earlier this month where she was admitted with hypertensive urgency.  At that time we changed her carvedilol to labetalol.  She was quickly readmitted because her BP dropped and she had mild FLETCHER which quickly resolved.  \par \par 10/18/19 -- Morena is here to see me again today after about two years.  For the last few months she has been having loose watery stools and decreased appetite.  Since last year around november her creatinine has been elevated.  She has lost weight, her legs feels skinny, she has not been short of breath.\par \par 1/31/2020 -- Morena is here today to see me again because her creatinine seems to be elevated above her baseline.  She reports that she has been feeling weak and tired with occasional vomiting and nausea decreased appetite and also diarrhea with a recent episode of bowel incontinence.  She reports that when she does not take the lasix she does in fact urinate less.  The patient has a detailed blood pressure log every day twice a day going back about three years.  Her BP is usually 130 systolic and she takes labetalol on an as needed basis and there are many days where she does not take it at all.

## 2020-01-31 NOTE — REVIEW OF SYSTEMS
[Feeling Poorly] : feeling poorly [Feeling Tired] : feeling tired [Eyesight Problems] : no eyesight problems [Nosebleeds] : no nosebleeds [Chest Pain] : no chest pain [Lower Ext Edema] : no lower extremity edema [Shortness Of Breath] : no shortness of breath [As Noted in HPI] : as noted in HPI [Arthralgias] : no arthralgias [Fainting] : no fainting [Joint Pain] : no joint pain [Dizziness] : no dizziness [Anxiety] : no anxiety [Depression] : no depression [Easy Bleeding] : no tendency for easy bleeding [Easy Bruising] : no tendency for easy bruising

## 2020-01-31 NOTE — PHYSICAL EXAM
[General Appearance - Alert] : alert [General Appearance - In No Acute Distress] : in no acute distress [General Appearance - Well Developed] : well developed [Sclera] : the sclera and conjunctiva were normal [General Appearance - Well Nourished] : well nourished [Oropharynx] : the oropharynx was normal [Outer Ear] : the ears and nose were normal in appearance [Neck Appearance] : the appearance of the neck was normal [Neck Cervical Mass (___cm)] : no neck mass was observed [Jugular Venous Distention Increased] : there was no jugular-venous distention [Respiration, Rhythm And Depth] : normal respiratory rhythm and effort [Exaggerated Use Of Accessory Muscles For Inspiration] : no accessory muscle use [Auscultation Breath Sounds / Voice Sounds] : lungs were clear to auscultation bilaterally [Heart Sounds] : normal S1 and S2 [Heart Sounds Gallop] : no gallops [Murmurs] : no murmurs [Edema] : there was no peripheral edema [Abdomen Soft] : soft [Heart Sounds Pericardial Friction Rub] : no pericardial rub [Abdomen Tenderness] : non-tender [] : no hepato-splenomegaly [Abdomen Mass (___ Cm)] : no abdominal mass palpated [Cervical Lymph Nodes Enlarged Posterior Bilaterally] : posterior cervical [Cervical Lymph Nodes Enlarged Anterior Bilaterally] : anterior cervical [Supraclavicular Lymph Nodes Enlarged Bilaterally] : supraclavicular [No CVA Tenderness] : no ~M costovertebral angle tenderness [No Spinal Tenderness] : no spinal tenderness [Abnormal Walk] : normal gait [Cranial Nerves] : cranial nerves 2-12 were intact [No Focal Deficits] : no focal deficits [Oriented To Time, Place, And Person] : oriented to person, place, and time [Motor Exam] : the motor exam was normal [Affect] : the affect was normal [Mood] : the mood was normal [Impaired Insight] : insight and judgment were intact

## 2020-01-31 NOTE — ASSESSMENT
[FreeTextEntry1] : Ms Morena Zuniga is a 89 years old woman with a history of hypertension seen for hospital follow up.\par \par Chronic Kidney Disease Stage III -- Etiology is possible from DM with HTN and age-related GFR decline.  Ultrasound findings consistent with CKD last May.  I have recommended the following changes: stop benzapril completelty and begin taking labetalol consistently 200 TID.  Monitor BP daily and notify me for readings > 180 / 100.  Repeat blood work next week.  She has no asterixis, no metallic taste in the mouth,  I do not suspect that her decreased appetite is entirely related to renal dysfunction especially given the diarrhea.  However, if she continues to feels this way and creatinine continues to worsen we will have to begin a more in depth discussion about end-stage kidney disease.  We briefly discussed the options today which include HD vs PD vs conservative care.  She agreed that she would be a good candidate for conservative care.

## 2020-02-13 ENCOUNTER — APPOINTMENT (OUTPATIENT)
Dept: INTERNAL MEDICINE | Facility: CLINIC | Age: 85
End: 2020-02-13

## 2020-02-13 ENCOUNTER — LABORATORY RESULT (OUTPATIENT)
Age: 85
End: 2020-02-13

## 2020-02-14 LAB
ALBUMIN SERPL ELPH-MCNC: 4.1 G/DL
ANION GAP SERPL CALC-SCNC: 17 MMOL/L
APPEARANCE: ABNORMAL
BACTERIA: NEGATIVE
BILIRUBIN URINE: NEGATIVE
BLOOD URINE: NEGATIVE
BUN SERPL-MCNC: 34 MG/DL
CALCIUM SERPL-MCNC: 9.6 MG/DL
CHLORIDE SERPL-SCNC: 102 MMOL/L
CO2 SERPL-SCNC: 24 MMOL/L
COLOR: YELLOW
CREAT SERPL-MCNC: 2.06 MG/DL
CREAT SPEC-SCNC: 63 MG/DL
CREAT/PROT UR: 0.1 RATIO
GLUCOSE QUALITATIVE U: NEGATIVE
GLUCOSE SERPL-MCNC: 77 MG/DL
HYALINE CASTS: 2 /LPF
KETONES URINE: NEGATIVE
LEUKOCYTE ESTERASE URINE: ABNORMAL
MICROSCOPIC-UA: NORMAL
NITRITE URINE: NEGATIVE
PH URINE: 5.5
PHOSPHATE SERPL-MCNC: 4.2 MG/DL
POTASSIUM SERPL-SCNC: 4.5 MMOL/L
PROT UR-MCNC: 6 MG/DL
PROTEIN URINE: NEGATIVE
RED BLOOD CELLS URINE: 4 /HPF
SODIUM SERPL-SCNC: 143 MMOL/L
SPECIFIC GRAVITY URINE: 1.01
SQUAMOUS EPITHELIAL CELLS: 1 /HPF
UROBILINOGEN URINE: NORMAL
WHITE BLOOD CELLS URINE: 4 /HPF

## 2020-02-15 NOTE — PATIENT PROFILE ADULT. - BLOOD TRANSFUSION, PREVIOUS, PROFILE
Patient called today.    Patient had appointment cancelled, but is requesting to re-schedule appointment on Monday, February 17, 2020 at 4:00 PM with  [facility].    Please contact patient.    Thank you.    Central Scheduling  Hamida NORTON   no

## 2020-03-24 ENCOUNTER — APPOINTMENT (OUTPATIENT)
Dept: INTERNAL MEDICINE | Facility: CLINIC | Age: 85
End: 2020-03-24

## 2020-04-23 ENCOUNTER — APPOINTMENT (OUTPATIENT)
Dept: NEPHROLOGY | Facility: CLINIC | Age: 85
End: 2020-04-23

## 2020-04-24 LAB
ALBUMIN SERPL ELPH-MCNC: 4.2 G/DL
ANION GAP SERPL CALC-SCNC: 14 MMOL/L
BASOPHILS # BLD AUTO: 0.06 K/UL
BASOPHILS NFR BLD AUTO: 0.7 %
BUN SERPL-MCNC: 30 MG/DL
CALCIUM SERPL-MCNC: 9.2 MG/DL
CHLORIDE SERPL-SCNC: 103 MMOL/L
CO2 SERPL-SCNC: 27 MMOL/L
CREAT SERPL-MCNC: 1.98 MG/DL
EOSINOPHIL # BLD AUTO: 0.64 K/UL
EOSINOPHIL NFR BLD AUTO: 7.8 %
FERRITIN SERPL-MCNC: 70 NG/ML
GLUCOSE SERPL-MCNC: 96 MG/DL
HCT VFR BLD CALC: 36.6 %
HGB BLD-MCNC: 11.4 G/DL
IMM GRANULOCYTES NFR BLD AUTO: 0.2 %
IRON SATN MFR SERPL: 24 %
IRON SERPL-MCNC: 81 UG/DL
LYMPHOCYTES # BLD AUTO: 1.4 K/UL
LYMPHOCYTES NFR BLD AUTO: 17.1 %
MAN DIFF?: NORMAL
MCHC RBC-ENTMCNC: 28.7 PG
MCHC RBC-ENTMCNC: 31.1 GM/DL
MCV RBC AUTO: 92.2 FL
MONOCYTES # BLD AUTO: 0.63 K/UL
MONOCYTES NFR BLD AUTO: 7.7 %
NEUTROPHILS # BLD AUTO: 5.45 K/UL
NEUTROPHILS NFR BLD AUTO: 66.5 %
PHOSPHATE SERPL-MCNC: 3.8 MG/DL
PLATELET # BLD AUTO: 270 K/UL
POTASSIUM SERPL-SCNC: 4.5 MMOL/L
RBC # BLD: 3.97 M/UL
RBC # FLD: 15.5 %
SODIUM SERPL-SCNC: 143 MMOL/L
TIBC SERPL-MCNC: 336 UG/DL
UIBC SERPL-MCNC: 255 UG/DL
WBC # FLD AUTO: 8.2 K/UL

## 2020-05-20 ENCOUNTER — APPOINTMENT (OUTPATIENT)
Dept: INTERNAL MEDICINE | Facility: CLINIC | Age: 85
End: 2020-05-20
Payer: MEDICARE

## 2020-05-20 PROCEDURE — 99441: CPT | Mod: 95

## 2020-05-20 NOTE — ASSESSMENT
[FreeTextEntry1] : Probably early cellulitis, possibly stasis dermatitis. Patient is an elderly diabetic with CKD.

## 2020-05-20 NOTE — PHYSICAL EXAM
[de-identified] : The lesio is about 3 cm makayla dn1 1/2 cm wide erythematous edges are not sharp but don't fade into skin surrounding. Slight pusffiness . No entru lesion is cleaerly visible.

## 2020-05-20 NOTE — HISTORY OF PRESENT ILLNESS
[Home] : at home, [unfilled] , at the time of the visit. [Medical Office: ([place] [facility])___] : at the medical office located in  [Other:____] : [unfilled] [Verbal consent obtained from patient] : the patient, [unfilled] [FreeTextEntry4] : Luz Patton [FreeTextEntry8] : Patient noted a reddish lesion on her leg today. She has had cellulitis in the past and it usually starts like this, then spreads. Her daughter notes that the lesion is warm, but not painful. Area has been dry and patient tends to scratch it. Luz Patton sent a photo by email.

## 2020-06-03 ENCOUNTER — RX RENEWAL (OUTPATIENT)
Age: 85
End: 2020-06-03

## 2020-08-06 ENCOUNTER — APPOINTMENT (OUTPATIENT)
Dept: INTERNAL MEDICINE | Facility: CLINIC | Age: 85
End: 2020-08-06
Payer: MEDICARE

## 2020-08-06 PROCEDURE — 99442: CPT | Mod: 95

## 2020-08-06 RX ORDER — AMOXICILLIN AND CLAVULANATE POTASSIUM 500; 125 MG/1; MG/1
500-125 TABLET, FILM COATED ORAL
Qty: 14 | Refills: 1 | Status: DISCONTINUED | COMMUNITY
Start: 2020-05-20 | End: 2020-08-06

## 2020-08-10 ENCOUNTER — INPATIENT (INPATIENT)
Facility: HOSPITAL | Age: 85
LOS: 2 days | Discharge: HOME | End: 2020-08-13
Attending: INTERNAL MEDICINE | Admitting: INTERNAL MEDICINE
Payer: MEDICARE

## 2020-08-10 VITALS
TEMPERATURE: 98 F | HEIGHT: 64 IN | OXYGEN SATURATION: 98 % | SYSTOLIC BLOOD PRESSURE: 148 MMHG | RESPIRATION RATE: 18 BRPM | DIASTOLIC BLOOD PRESSURE: 78 MMHG | WEIGHT: 184.97 LBS | HEART RATE: 64 BPM

## 2020-08-10 DIAGNOSIS — E11.9 TYPE 2 DIABETES MELLITUS WITHOUT COMPLICATIONS: ICD-10-CM

## 2020-08-10 DIAGNOSIS — E78.5 HYPERLIPIDEMIA, UNSPECIFIED: ICD-10-CM

## 2020-08-10 DIAGNOSIS — M16.11 UNILATERAL PRIMARY OSTEOARTHRITIS, RIGHT HIP: Chronic | ICD-10-CM

## 2020-08-10 DIAGNOSIS — K42.9 UMBILICAL HERNIA WITHOUT OBSTRUCTION OR GANGRENE: Chronic | ICD-10-CM

## 2020-08-10 DIAGNOSIS — E03.9 HYPOTHYROIDISM, UNSPECIFIED: ICD-10-CM

## 2020-08-10 DIAGNOSIS — K21.9 GASTRO-ESOPHAGEAL REFLUX DISEASE WITHOUT ESOPHAGITIS: ICD-10-CM

## 2020-08-10 DIAGNOSIS — L03.115 CELLULITIS OF RIGHT LOWER LIMB: ICD-10-CM

## 2020-08-10 DIAGNOSIS — I10 ESSENTIAL (PRIMARY) HYPERTENSION: ICD-10-CM

## 2020-08-10 LAB
ALBUMIN SERPL ELPH-MCNC: 3.2 G/DL — LOW (ref 3.3–5)
ALP SERPL-CCNC: 81 U/L — SIGNIFICANT CHANGE UP (ref 40–120)
ALT FLD-CCNC: 32 U/L — SIGNIFICANT CHANGE UP (ref 12–78)
ANION GAP SERPL CALC-SCNC: 7 MMOL/L — SIGNIFICANT CHANGE UP (ref 5–17)
APTT BLD: 30.1 SEC — SIGNIFICANT CHANGE UP (ref 27.5–35.5)
AST SERPL-CCNC: 25 U/L — SIGNIFICANT CHANGE UP (ref 15–37)
BASOPHILS # BLD AUTO: 0.05 K/UL — SIGNIFICANT CHANGE UP (ref 0–0.2)
BASOPHILS NFR BLD AUTO: 0.6 % — SIGNIFICANT CHANGE UP (ref 0–2)
BILIRUB SERPL-MCNC: 0.6 MG/DL — SIGNIFICANT CHANGE UP (ref 0.2–1.2)
BUN SERPL-MCNC: 36 MG/DL — HIGH (ref 7–23)
CALCIUM SERPL-MCNC: 8.4 MG/DL — LOW (ref 8.5–10.1)
CHLORIDE SERPL-SCNC: 108 MMOL/L — SIGNIFICANT CHANGE UP (ref 96–108)
CO2 SERPL-SCNC: 26 MMOL/L — SIGNIFICANT CHANGE UP (ref 22–31)
CREAT SERPL-MCNC: 2.21 MG/DL — HIGH (ref 0.5–1.3)
EOSINOPHIL # BLD AUTO: 0.68 K/UL — HIGH (ref 0–0.5)
EOSINOPHIL NFR BLD AUTO: 8.8 % — HIGH (ref 0–6)
ERYTHROCYTE [SEDIMENTATION RATE] IN BLOOD: 35 MM/HR — HIGH (ref 0–20)
GLUCOSE BLDC GLUCOMTR-MCNC: 112 MG/DL — HIGH (ref 70–99)
GLUCOSE SERPL-MCNC: 124 MG/DL — HIGH (ref 70–99)
HCT VFR BLD CALC: 32.6 % — LOW (ref 34.5–45)
HGB BLD-MCNC: 10.5 G/DL — LOW (ref 11.5–15.5)
IMM GRANULOCYTES NFR BLD AUTO: 0.3 % — SIGNIFICANT CHANGE UP (ref 0–1.5)
INR BLD: 1.1 RATIO — SIGNIFICANT CHANGE UP (ref 0.88–1.16)
LYMPHOCYTES # BLD AUTO: 1.21 K/UL — SIGNIFICANT CHANGE UP (ref 1–3.3)
LYMPHOCYTES # BLD AUTO: 15.6 % — SIGNIFICANT CHANGE UP (ref 13–44)
MCHC RBC-ENTMCNC: 28.5 PG — SIGNIFICANT CHANGE UP (ref 27–34)
MCHC RBC-ENTMCNC: 32.2 GM/DL — SIGNIFICANT CHANGE UP (ref 32–36)
MCV RBC AUTO: 88.3 FL — SIGNIFICANT CHANGE UP (ref 80–100)
MONOCYTES # BLD AUTO: 0.64 K/UL — SIGNIFICANT CHANGE UP (ref 0–0.9)
MONOCYTES NFR BLD AUTO: 8.3 % — SIGNIFICANT CHANGE UP (ref 2–14)
NEUTROPHILS # BLD AUTO: 5.15 K/UL — SIGNIFICANT CHANGE UP (ref 1.8–7.4)
NEUTROPHILS NFR BLD AUTO: 66.4 % — SIGNIFICANT CHANGE UP (ref 43–77)
NRBC # BLD: 0 /100 WBCS — SIGNIFICANT CHANGE UP (ref 0–0)
PLATELET # BLD AUTO: 265 K/UL — SIGNIFICANT CHANGE UP (ref 150–400)
POTASSIUM SERPL-MCNC: 3.7 MMOL/L — SIGNIFICANT CHANGE UP (ref 3.5–5.3)
POTASSIUM SERPL-SCNC: 3.7 MMOL/L — SIGNIFICANT CHANGE UP (ref 3.5–5.3)
PROT SERPL-MCNC: 6.9 GM/DL — SIGNIFICANT CHANGE UP (ref 6–8.3)
PROTHROM AB SERPL-ACNC: 12.7 SEC — SIGNIFICANT CHANGE UP (ref 10.6–13.6)
RBC # BLD: 3.69 M/UL — LOW (ref 3.8–5.2)
RBC # FLD: 15.7 % — HIGH (ref 10.3–14.5)
SARS-COV-2 RNA SPEC QL NAA+PROBE: SIGNIFICANT CHANGE UP
SODIUM SERPL-SCNC: 141 MMOL/L — SIGNIFICANT CHANGE UP (ref 135–145)
WBC # BLD: 7.75 K/UL — SIGNIFICANT CHANGE UP (ref 3.8–10.5)
WBC # FLD AUTO: 7.75 K/UL — SIGNIFICANT CHANGE UP (ref 3.8–10.5)

## 2020-08-10 PROCEDURE — 71045 X-RAY EXAM CHEST 1 VIEW: CPT | Mod: 26

## 2020-08-10 PROCEDURE — 99223 1ST HOSP IP/OBS HIGH 75: CPT | Mod: AI

## 2020-08-10 PROCEDURE — 73590 X-RAY EXAM OF LOWER LEG: CPT | Mod: 26,LT

## 2020-08-10 PROCEDURE — 93010 ELECTROCARDIOGRAM REPORT: CPT

## 2020-08-10 PROCEDURE — 99285 EMERGENCY DEPT VISIT HI MDM: CPT | Mod: CS

## 2020-08-10 PROCEDURE — 93971 EXTREMITY STUDY: CPT | Mod: 26

## 2020-08-10 RX ORDER — CEFAZOLIN SODIUM 1 G
500 VIAL (EA) INJECTION EVERY 8 HOURS
Refills: 0 | Status: DISCONTINUED | OUTPATIENT
Start: 2020-08-10 | End: 2020-08-10

## 2020-08-10 RX ORDER — LEVOTHYROXINE SODIUM 125 MCG
75 TABLET ORAL DAILY
Refills: 0 | Status: DISCONTINUED | OUTPATIENT
Start: 2020-08-10 | End: 2020-08-13

## 2020-08-10 RX ORDER — ALLOPURINOL 300 MG
100 TABLET ORAL DAILY
Refills: 0 | Status: DISCONTINUED | OUTPATIENT
Start: 2020-08-10 | End: 2020-08-13

## 2020-08-10 RX ORDER — LEVOTHYROXINE SODIUM 125 MCG
1 TABLET ORAL
Qty: 0 | Refills: 0 | DISCHARGE

## 2020-08-10 RX ORDER — AMITRIPTYLINE HCL 25 MG
1 TABLET ORAL
Qty: 0 | Refills: 0 | DISCHARGE

## 2020-08-10 RX ORDER — ATORVASTATIN CALCIUM 80 MG/1
1 TABLET, FILM COATED ORAL
Qty: 0 | Refills: 0 | DISCHARGE

## 2020-08-10 RX ORDER — AMITRIPTYLINE HCL 25 MG
25 TABLET ORAL AT BEDTIME
Refills: 0 | Status: DISCONTINUED | OUTPATIENT
Start: 2020-08-10 | End: 2020-08-13

## 2020-08-10 RX ORDER — FUROSEMIDE 40 MG
1 TABLET ORAL
Qty: 0 | Refills: 0 | DISCHARGE

## 2020-08-10 RX ORDER — CLOPIDOGREL BISULFATE 75 MG/1
1 TABLET, FILM COATED ORAL
Qty: 0 | Refills: 0 | DISCHARGE

## 2020-08-10 RX ORDER — BENAZEPRIL HYDROCHLORIDE 40 MG/1
1 TABLET ORAL
Qty: 0 | Refills: 0 | DISCHARGE

## 2020-08-10 RX ORDER — INSULIN GLARGINE 100 [IU]/ML
24 INJECTION, SOLUTION SUBCUTANEOUS EVERY MORNING
Refills: 0 | Status: DISCONTINUED | OUTPATIENT
Start: 2020-08-11 | End: 2020-08-13

## 2020-08-10 RX ORDER — INSULIN LISPRO 100/ML
VIAL (ML) SUBCUTANEOUS
Refills: 0 | Status: DISCONTINUED | OUTPATIENT
Start: 2020-08-10 | End: 2020-08-13

## 2020-08-10 RX ORDER — SODIUM CHLORIDE 9 MG/ML
1000 INJECTION, SOLUTION INTRAVENOUS
Refills: 0 | Status: DISCONTINUED | OUTPATIENT
Start: 2020-08-10 | End: 2020-08-13

## 2020-08-10 RX ORDER — ENOXAPARIN SODIUM 100 MG/ML
24 INJECTION SUBCUTANEOUS
Qty: 0 | Refills: 0 | DISCHARGE

## 2020-08-10 RX ORDER — AMLODIPINE BESYLATE 2.5 MG/1
1 TABLET ORAL
Qty: 0 | Refills: 0 | DISCHARGE

## 2020-08-10 RX ORDER — LISINOPRIL 2.5 MG/1
20 TABLET ORAL DAILY
Refills: 0 | Status: DISCONTINUED | OUTPATIENT
Start: 2020-08-11 | End: 2020-08-13

## 2020-08-10 RX ORDER — ALLOPURINOL 300 MG
50 TABLET ORAL
Refills: 0 | Status: DISCONTINUED | OUTPATIENT
Start: 2020-08-10 | End: 2020-08-10

## 2020-08-10 RX ORDER — DEXTROSE 50 % IN WATER 50 %
12.5 SYRINGE (ML) INTRAVENOUS ONCE
Refills: 0 | Status: DISCONTINUED | OUTPATIENT
Start: 2020-08-10 | End: 2020-08-13

## 2020-08-10 RX ORDER — AMLODIPINE BESYLATE 2.5 MG/1
5 TABLET ORAL DAILY
Refills: 0 | Status: DISCONTINUED | OUTPATIENT
Start: 2020-08-11 | End: 2020-08-13

## 2020-08-10 RX ORDER — DEXTROSE 50 % IN WATER 50 %
25 SYRINGE (ML) INTRAVENOUS ONCE
Refills: 0 | Status: DISCONTINUED | OUTPATIENT
Start: 2020-08-10 | End: 2020-08-13

## 2020-08-10 RX ORDER — DEXTROSE 50 % IN WATER 50 %
15 SYRINGE (ML) INTRAVENOUS ONCE
Refills: 0 | Status: DISCONTINUED | OUTPATIENT
Start: 2020-08-10 | End: 2020-08-13

## 2020-08-10 RX ORDER — FUROSEMIDE 40 MG
40 TABLET ORAL DAILY
Refills: 0 | Status: DISCONTINUED | OUTPATIENT
Start: 2020-08-11 | End: 2020-08-13

## 2020-08-10 RX ORDER — METFORMIN HYDROCHLORIDE 850 MG/1
1 TABLET ORAL
Qty: 0 | Refills: 0 | DISCHARGE

## 2020-08-10 RX ORDER — LABETALOL HCL 100 MG
0 TABLET ORAL
Qty: 0 | Refills: 0 | DISCHARGE

## 2020-08-10 RX ORDER — GLUCAGON INJECTION, SOLUTION 0.5 MG/.1ML
1 INJECTION, SOLUTION SUBCUTANEOUS ONCE
Refills: 0 | Status: DISCONTINUED | OUTPATIENT
Start: 2020-08-10 | End: 2020-08-13

## 2020-08-10 RX ORDER — LABETALOL HCL 100 MG
1 TABLET ORAL
Qty: 0 | Refills: 0 | DISCHARGE

## 2020-08-10 RX ORDER — ALLOPURINOL 300 MG
1 TABLET ORAL
Qty: 0 | Refills: 0 | DISCHARGE

## 2020-08-10 RX ORDER — CEFAZOLIN SODIUM 1 G
1000 VIAL (EA) INJECTION EVERY 12 HOURS
Refills: 0 | Status: DISCONTINUED | OUTPATIENT
Start: 2020-08-10 | End: 2020-08-13

## 2020-08-10 RX ORDER — ATORVASTATIN CALCIUM 80 MG/1
80 TABLET, FILM COATED ORAL AT BEDTIME
Refills: 0 | Status: DISCONTINUED | OUTPATIENT
Start: 2020-08-10 | End: 2020-08-13

## 2020-08-10 RX ORDER — CLOPIDOGREL BISULFATE 75 MG/1
75 TABLET, FILM COATED ORAL DAILY
Refills: 0 | Status: DISCONTINUED | OUTPATIENT
Start: 2020-08-10 | End: 2020-08-13

## 2020-08-10 RX ORDER — MONTELUKAST 4 MG/1
1 TABLET, CHEWABLE ORAL
Qty: 0 | Refills: 0 | DISCHARGE

## 2020-08-10 RX ORDER — LABETALOL HCL 100 MG
200 TABLET ORAL
Refills: 0 | Status: DISCONTINUED | OUTPATIENT
Start: 2020-08-10 | End: 2020-08-13

## 2020-08-10 RX ORDER — ALLOPURINOL 300 MG
300 TABLET ORAL DAILY
Refills: 0 | Status: DISCONTINUED | OUTPATIENT
Start: 2020-08-10 | End: 2020-08-10

## 2020-08-10 RX ORDER — VANCOMYCIN HCL 1 G
1000 VIAL (EA) INTRAVENOUS ONCE
Refills: 0 | Status: COMPLETED | OUTPATIENT
Start: 2020-08-10 | End: 2020-08-10

## 2020-08-10 RX ORDER — HEPARIN SODIUM 5000 [USP'U]/ML
5000 INJECTION INTRAVENOUS; SUBCUTANEOUS EVERY 8 HOURS
Refills: 0 | Status: DISCONTINUED | OUTPATIENT
Start: 2020-08-10 | End: 2020-08-13

## 2020-08-10 RX ADMIN — ATORVASTATIN CALCIUM 80 MILLIGRAM(S): 80 TABLET, FILM COATED ORAL at 21:54

## 2020-08-10 RX ADMIN — Medication 250 MILLIGRAM(S): at 15:46

## 2020-08-10 RX ADMIN — Medication 25 MILLIGRAM(S): at 21:55

## 2020-08-10 RX ADMIN — Medication 200 MILLIGRAM(S): at 21:55

## 2020-08-10 RX ADMIN — HEPARIN SODIUM 5000 UNIT(S): 5000 INJECTION INTRAVENOUS; SUBCUTANEOUS at 21:55

## 2020-08-10 RX ADMIN — Medication 100 MILLIGRAM(S): at 22:19

## 2020-08-10 NOTE — ED ADULT TRIAGE NOTE - CHIEF COMPLAINT QUOTE
swelling and redness to left lower leg, advised ed visit by pmd John Paul for iv antibiotics, currently on Levaquin day 4

## 2020-08-10 NOTE — ED PROVIDER NOTE - NSCAREINITIATED _GEN_ER
Medicare Wellness Visit  Plan for Preventive Care    A good way for you to stay healthy is to use preventive care.  Medicare covers many services that can help you stay healthy.* The goal of these services is to find any health problems as quickly as possible. Finding problems early can help make them easier to treat.  Your personal plan below lists the services you may need and when they are due.     Health Maintenance Summary     DTaP/Tdap/Td Vaccine (1 - Tdap)  Overdue since 1/12/1999    Hepatitis C Screening (Once)  Overdue since 8/27/2002    Colorectal Cancer Screening-Colonoscopy (Every 10 Years)  Overdue since 5/8/2016    Medicare Wellness 65+ (Yearly)  Overdue since 4/3/2019    Pneumococcal Vaccine 65+ (2 of 2 - PPSV23)  Overdue since 4/3/2019    Shingles Vaccine (1 of 2)  Postponed until 7/18/2019    Depression Screening (Yearly)  Next due on 2/14/2020    Breast Cancer Screening (Every 2 Years)  Next due on 1/10/2021    Influenza Vaccine   Completed    Osteoporosis Screening   Completed           Preventive Care for Women and Men    Heart Screenings (Cardiovascular):  · Blood tests are used to check your cholesterol, lipid and triglyceride levels. High levels can increase your risk for heart disease and stroke. High levels can be treated with medications, diet and exercise. Lowering your levels can help keep your heart and blood vessels healthy.  Your provider will order these tests if they are needed.    · An ultrasound is done to see if you have an abdominal aortic aneurysm (AAA).  This is an enlargement of one of the main blood vessels that delivers blood to the body.   In the [place], 9,000 deaths are caused by AAA.  You may not even know you have this problem and as many as 1 in 3 people will have a serious problem if it is not treated.  Early diagnosis allows for more effective treatment and cure.  If you have a family history of AAA or are a male age 65-75 who has smoked, you are at higher  risk of an AAA.  Your provider can order this test, if needed.    Colorectal Screening:  · There are many tests that are used to check for cancer of your colon and rectum. You and your provider should discuss what test is best for you and when to have it done.  Options include:  · Screening Colonoscopy: exam of the entire colon, seen through a flexible lighted tube.  · Flexible Sigmoidoscopy: exam of the last third (sigmoid portion) of the colon and rectum, seen through a flexible lighted tube.  · Cologuard DNA stool test: a sample of your stool is used to screen for cancer and unseen blood in your stool.  · Fecal Occult Blood Test: a sample of your stool is studied to find any unseen blood    Flu Shot:  · An immunization that helps to prevent influenza (the flu). You should get this every year. The best time to get the shot is in the fall.    Pneumococcal Shot:  • Vaccines are available that can help prevent pneumococcal disease, which is any type of infection caused by Streptococcus pneumoniae bacteria.   Their use can prevent some cases of pneumonia, meningitis, and sepsis. There are two types of pneumococcal vaccines:   o Conjugate vaccines (PCV-13 or Prevnar 13®) - helps protect against the 13 types of pneumococcal bacteria that are the most common causes of serious infections in children and adults.    o Polysaccharide vaccine (PPSV23 or [id number]®) - helps protect against 23 types of pneumococcal bacteria for patients who are recommended to get it.  These vaccines should be given at least 12 months apart.  A booster is usually not needed.     Hepatitis B Shot:  · An immunization that helps to protect people from getting Hepatitis B. Hepatitis B is a virus that spreads through contact with infected blood or body fluids. Many people with the virus do not have symptoms.  The virus can lead to serious problems, such as liver disease. Some people are at higher risk than others. Your doctor will tell you if you  need this shot.     Diabetes Screening:  · A test to measure sugar (glucose) in your blood is called a fasting blood sugar. Fasting means you cannot have food or drink for at least 8 hours before the test. This test can detect diabetes long before you may notice symptoms.    Glaucoma Screening:  · Glaucoma screening is performed by your eye doctor. The test measures the fluid pressure inside your eyes to determine if you have glaucoma.     Hepatitis C Screening:  · A blood test to see if you have the hepatitis C virus.  Hepatitis C attacks the liver and is a major cause of chronic liver disease.  Medicare will cover a single screening for all adults born between 1945 & 1965, or high risk patients (people who have injected illegal drugs or people who have had blood transfusions).  High risk patients who continue to inject illegal drugs can be screened for Hepatitis C every year.    Smoking and Tobacco-Use Cessation Counseling:  · Tobacco is the single greatest cause of disease and early death in our country today. Medication and counseling together can increase a person’s chance of quitting for good.   · Medicare covers two quitting attempts per year, with four counseling sessions per attempt (eight sessions in a 12 month period)    Preventive Screening tests for Women    Screening Mammograms and Breast Exams:  · An x-ray of your breasts to check for breast cancer before you or your doctor may be able to feel it.  If breast cancer is found early it can usually be treated with success.    Pelvic Exams and Pap Tests:  · An exam to check for cervical and vaginal cancer. A Pap test is a lab test in which cells are taken from your cervix and sent to the lab to look for signs of cervical cancer. If cancer of the cervix is found early, chances for a cure are good. Testing can generally end at age 65, or if a woman has a hysterectomy for a benign condition. Your provider may recommend more frequent testing if certain  abnormal results are found.    Bone Mass Measurements:  · A painless x-ray of your bone density to see if you are at risk for a broken bone. Bone density refers to the thickness of bones or how tightly the bone tissue is packed.    Preventive Screening tests for Men    Prostate Screening:  · PSA - Prostate Cancer blood test.  Experts do not recommend routine screening of healthy men with no signs or symptoms of prostate disease.  However, men should not ignore urinary symptoms, and should discuss their family history with their doctor.    *Medicare pays for many preventive services to keep you healthy. For some of these services, you might have to pay a deductible, coinsurance, and / or copayment.  The amounts vary depending on the type of services you need and the kind of Medicare health plan you have.               Gloria Otero(Attending)

## 2020-08-10 NOTE — H&P ADULT - REASON FOR ADMISSION
cellulitis Valtrex Pregnancy And Lactation Text: this medication is Pregnancy Category B and is considered safe during pregnancy. This medication is not directly found in breast milk but it's metabolite acyclovir is present.

## 2020-08-10 NOTE — H&P ADULT - NSHPREVIEWOFSYSTEMS_GEN_ALL_CORE
CONSTITUTIONAL: No fever, weight loss, or fatigue  EYES: No eye pain, visual disturbances, or discharge  ENMT:  No difficulty hearing, tinnitus, vertigo; No sinus or throat pain  NECK: No pain or stiffness  RESPIRATORY: No cough, wheezing, chills or hemoptysis; No shortness of breath  CARDIOVASCULAR: No chest pain, palpitations, dizziness, or leg swelling  GASTROINTESTINAL: No abdominal or epigastric pain. No nausea, vomiting, or hematemesis; No diarrhea or constipation. No melena or hematochezia.  GENITOURINARY: No dysuria, frequency, hematuria, or incontinence  NEUROLOGICAL: No headaches, memory loss, loss of strength, numbness, or tremors  SKIN: No itching, burning, rashes, or lesions   LYMPH NODES: No enlarged glands  ENDOCRINE: No heat or cold intolerance; No hair loss  MUSCULOSKELETAL: left extremity pain  extremity pain  PSYCHIATRIC: No depression, anxiety, mood swings, or difficulty sleeping  HEME/LYMPH: No easy bruising, or bleeding gums  ALLERGY AND IMMUNOLOGIC: No hives or eczema

## 2020-08-10 NOTE — ED PROVIDER NOTE - OBJECTIVE STATEMENT
89 yo F hx IDDM2, HTN, HLD, CHF on lasix, sent in by PCP for cellulitis that failed outpatient antibiotics. Patient endorses increased swelling warmth and pain to Left calf over the past day, in setting of one week of leg pain and swelling. Denies fevers/chill.s Reports similar episode back in March. No numbness/tingling.

## 2020-08-10 NOTE — ED PROVIDER NOTE - PHYSICAL EXAMINATION
VITALS: reviewed  GEN: NAD, A & O x 4  HEAD/EYES: NCAT, PERRL, EOMI, anicteric sclerae, no conjunctival pallor  ENT: mucus membranes moist, oropharynx WNL, trachea midline, no JVD  RESP: lungs CTA with equal breath sounds bilaterally, chest wall nontender and atraumatic  CV: heart with reg rhythm S1, S2, no murmur; distal pulses intact and symmetric bilaterally  ABDOMEN: soft, nondistended, nontender, no palpable masses  : no CVAT  MSK: extremities atraumatic and nontender, b/l pitting edema R>L, no asymmetry. the back is without midline or lateral tenderness, there is no spinal deformity or stepoff and the back is ranged painlessly. the neck has no midline tenderness, deformity, or stepoff, and is ranged painlessly.  SKIN: warm, dry, erythema, warmth, swelling to LLE no crepitus, area marked, no bruising, no cyanosis. color appropriate for ethnicity  NEURO: alert, mentating appropriately, no facial asymmetry. gross sensation, motor, coordination are intact  PSYCH: Affect appropriate

## 2020-08-10 NOTE — ED ADULT NURSE NOTE - OBJECTIVE STATEMENT
pt presents to ER with c/o edema and redness to left lower leg x 7 days  advised ed visit by kwesi Coker for iv antibiotics, currently on Levaquin day 4 with no improvement but rather worsening symptoms

## 2020-08-10 NOTE — CONSULT NOTE ADULT - SUBJECTIVE AND OBJECTIVE BOX
SUBJECTIVE: 89 W h/o T2DM on insulin, gout, h/o TIA presents with 1 week c/o rash. Patient states she initially presented to her PMD who prescribed her a course of Levaquin, but this did not improve the rash and she was instructed to go to the ED.    She states her rash involves her L leg. Rash is red and "hot". No fevers. No recent travel. No SOB or waking up SOB. Reports chronic leg swelling of both legs.    OBJECTIVE  ROS:  +rash  -f/c/n/v/d    PHYSICAL EXAM: Tele-evaluation precludes physical exam. Pertinent physical exam findings as per verbal communication by Dr Otero and nurse at bedside are as following:  Erythema involving L anterior shin, circumferential, no obvious purulence or drainage; patient is calm, lying in bed, AOx3, daughter at bedside    LABS AND IMAGING DATA:                        10.5   7.75  )-----------( 265      ( 10 Aug 2020 15:24 )             32.6     08-10    141  |  108  |  36<H>  ----------------------------<  124<H>  3.7   |  26  |  2.21<H>    Ca    8.4<L>      10 Aug 2020 15:24    TPro  6.9  /  Alb  3.2<L>  /  TBili  0.6  /  DBili  x   /  AST  25  /  ALT  32  /  AlkPhos  81  08-10    LE Duplex w/o e/o DVT      ASSESSMENT AND PLAN: 89 W h/o T2DM on insulin, gout, h/o TIA presents with 1 week c/o cellulitis not improved with course of levofloxacin. Afebrile.     1) Cellulitis - did not improve with 4 doses of levofloxacin; likely because this is not providing adequate coverage of cellulitis organisms. No purulence on exam or clinical instability to warrant empiric MRSA coverage. Will start renally-dosed cefazolin.    Duplex without e/o DVT.    2) T2DM on insulin - c/w home dose Lantus 24U qAM; start ISS. Monitor FSG TID ac.    3) Gout - no e/o acute flare. c/w home dose allopurinol    4) h/o TIA - c/w Plavix, atorvastatin     5) essential HTN - c/w labetalol, amlodipine, benazepril    6) chronic venous stasis - LE edema noted bilaterally, c/w furosemide 40mg daily    7) CKD III - appears to be slightly above baseline, not grossly volume depleted but with signs of edema on exam. continuing furosemide as above. Electrolytes acceptable.    8) acquired hypothyroidism - c/w home dose levothyroxine    9) FEN/PPx - carb consistent diet, HSQ for DVT ppx

## 2020-08-10 NOTE — ED PROVIDER NOTE - CLINICAL SUMMARY MEDICAL DECISION MAKING FREE TEXT BOX
89 yo F presenting with cellulitis LLE failed outpatient abx, tba IV abx, DVT study, XR r/o subcutaneous air or osteo,

## 2020-08-10 NOTE — H&P ADULT - HISTORY OF PRESENT ILLNESS
89 W h/o T2DM on insulin, gout, h/o TIA presents with 1 week c/o rash. Patient states she initially presented to her PMD who prescribed her a course of Levaquin, but this did not improve the rash and she was instructed to go to the ED.    She states her rash involves her L leg. Rash is red and "hot". No fevers. No recent travel. No SOB or waking up SOB. Reports chronic leg swelling of both legs.

## 2020-08-10 NOTE — H&P ADULT - NSHPPHYSICALEXAM_GEN_ALL_CORE
ICU Vital Signs Last 24 Hrs  T(C): 36.8 (10 Aug 2020 18:42), Max: 36.8 (10 Aug 2020 14:15)  T(F): 98.2 (10 Aug 2020 18:42), Max: 98.3 (10 Aug 2020 14:15)  HR: 67 (10 Aug 2020 18:42) (64 - 67)  BP: 155/62 (10 Aug 2020 18:42) (148/78 - 155/62)  BP(mean): --  ABP: --  ABP(mean): --  RR: 16 (10 Aug 2020 18:42) (16 - 18)  SpO2: 96% (10 Aug 2020 18:42) (96% - 98%)  GENERAL: NAD well-developed  HEAD:  Atraumatic, Normocephalic  EYES: EOMI, PERRLA, conjunctiva and sclera clear  ENMT: No tonsillar erythema, exudates, or enlargement; Moist mucous membranes, Good dentition, No lesions  NECK: Supple, No JVD, Normal thyroid  NERVOUS SYSTEM:  Alert & Oriented X3, Good concentration; Motor Strength 5/5 B/L upper and lower extremities; DTRs 2+ intact and symmetric  CHEST/LUNG: Clear to percussion bilaterally; No rales, rhonchi, wheezing, or rubs  HEART: Regular rate and rhythm; No murmurs, rubs, or gallops  ABDOMEN: Soft, Nontender, Nondistended; Bowel sounds present  EXTREMITIES:  right lower extremity   LYMPH: No lymphadenopathy   SKIN: No rashes or lesions

## 2020-08-11 DIAGNOSIS — L03.116 CELLULITIS OF LEFT LOWER LIMB: ICD-10-CM

## 2020-08-11 LAB
A1C WITH ESTIMATED AVERAGE GLUCOSE RESULT: 6.2 % — HIGH (ref 4–5.6)
ANION GAP SERPL CALC-SCNC: 8 MMOL/L — SIGNIFICANT CHANGE UP (ref 5–17)
BUN SERPL-MCNC: 34 MG/DL — HIGH (ref 7–23)
CALCIUM SERPL-MCNC: 8.6 MG/DL — SIGNIFICANT CHANGE UP (ref 8.5–10.1)
CHLORIDE SERPL-SCNC: 108 MMOL/L — SIGNIFICANT CHANGE UP (ref 96–108)
CO2 SERPL-SCNC: 27 MMOL/L — SIGNIFICANT CHANGE UP (ref 22–31)
CREAT SERPL-MCNC: 2.16 MG/DL — HIGH (ref 0.5–1.3)
CRP SERPL-MCNC: <0.1 MG/DL — SIGNIFICANT CHANGE UP (ref 0–0.4)
ESTIMATED AVERAGE GLUCOSE: 131 MG/DL — HIGH (ref 68–114)
GLUCOSE BLDC GLUCOMTR-MCNC: 101 MG/DL — HIGH (ref 70–99)
GLUCOSE BLDC GLUCOMTR-MCNC: 121 MG/DL — HIGH (ref 70–99)
GLUCOSE BLDC GLUCOMTR-MCNC: 124 MG/DL — HIGH (ref 70–99)
GLUCOSE BLDC GLUCOMTR-MCNC: 127 MG/DL — HIGH (ref 70–99)
GLUCOSE BLDC GLUCOMTR-MCNC: 95 MG/DL — SIGNIFICANT CHANGE UP (ref 70–99)
GLUCOSE SERPL-MCNC: 124 MG/DL — HIGH (ref 70–99)
HCT VFR BLD CALC: 33.5 % — LOW (ref 34.5–45)
HGB BLD-MCNC: 10.5 G/DL — LOW (ref 11.5–15.5)
MCHC RBC-ENTMCNC: 28.2 PG — SIGNIFICANT CHANGE UP (ref 27–34)
MCHC RBC-ENTMCNC: 31.3 GM/DL — LOW (ref 32–36)
MCV RBC AUTO: 90.1 FL — SIGNIFICANT CHANGE UP (ref 80–100)
NRBC # BLD: 0 /100 WBCS — SIGNIFICANT CHANGE UP (ref 0–0)
PLATELET # BLD AUTO: 245 K/UL — SIGNIFICANT CHANGE UP (ref 150–400)
POTASSIUM SERPL-MCNC: 3.7 MMOL/L — SIGNIFICANT CHANGE UP (ref 3.5–5.3)
POTASSIUM SERPL-SCNC: 3.7 MMOL/L — SIGNIFICANT CHANGE UP (ref 3.5–5.3)
RBC # BLD: 3.72 M/UL — LOW (ref 3.8–5.2)
RBC # FLD: 15.6 % — HIGH (ref 10.3–14.5)
SARS-COV-2 IGG SERPL QL IA: NEGATIVE — SIGNIFICANT CHANGE UP
SARS-COV-2 IGM SERPL IA-ACNC: <0.1 INDEX — SIGNIFICANT CHANGE UP
SODIUM SERPL-SCNC: 143 MMOL/L — SIGNIFICANT CHANGE UP (ref 135–145)
WBC # BLD: 7.44 K/UL — SIGNIFICANT CHANGE UP (ref 3.8–10.5)
WBC # FLD AUTO: 7.44 K/UL — SIGNIFICANT CHANGE UP (ref 3.8–10.5)

## 2020-08-11 PROCEDURE — 99233 SBSQ HOSP IP/OBS HIGH 50: CPT

## 2020-08-11 PROCEDURE — 99223 1ST HOSP IP/OBS HIGH 75: CPT

## 2020-08-11 RX ORDER — DEXTROSE MONOHYDRATE, SODIUM CHLORIDE, AND POTASSIUM CHLORIDE 50; .745; 4.5 G/1000ML; G/1000ML; G/1000ML
1000 INJECTION, SOLUTION INTRAVENOUS
Refills: 0 | Status: DISCONTINUED | OUTPATIENT
Start: 2020-08-11 | End: 2020-08-11

## 2020-08-11 RX ORDER — SODIUM CHLORIDE 9 MG/ML
1000 INJECTION INTRAMUSCULAR; INTRAVENOUS; SUBCUTANEOUS
Refills: 0 | Status: DISCONTINUED | OUTPATIENT
Start: 2020-08-11 | End: 2020-08-13

## 2020-08-11 RX ADMIN — Medication 200 MILLIGRAM(S): at 17:21

## 2020-08-11 RX ADMIN — SODIUM CHLORIDE 100 MILLILITER(S): 9 INJECTION INTRAMUSCULAR; INTRAVENOUS; SUBCUTANEOUS at 11:55

## 2020-08-11 RX ADMIN — ATORVASTATIN CALCIUM 80 MILLIGRAM(S): 80 TABLET, FILM COATED ORAL at 21:34

## 2020-08-11 RX ADMIN — Medication 100 MILLIGRAM(S): at 21:34

## 2020-08-11 RX ADMIN — CLOPIDOGREL BISULFATE 75 MILLIGRAM(S): 75 TABLET, FILM COATED ORAL at 11:56

## 2020-08-11 RX ADMIN — Medication 100 MILLIGRAM(S): at 11:55

## 2020-08-11 RX ADMIN — INSULIN GLARGINE 24 UNIT(S): 100 INJECTION, SOLUTION SUBCUTANEOUS at 08:45

## 2020-08-11 RX ADMIN — HEPARIN SODIUM 5000 UNIT(S): 5000 INJECTION INTRAVENOUS; SUBCUTANEOUS at 08:17

## 2020-08-11 RX ADMIN — SODIUM CHLORIDE 100 MILLILITER(S): 9 INJECTION INTRAMUSCULAR; INTRAVENOUS; SUBCUTANEOUS at 21:34

## 2020-08-11 RX ADMIN — LISINOPRIL 20 MILLIGRAM(S): 2.5 TABLET ORAL at 08:17

## 2020-08-11 RX ADMIN — AMLODIPINE BESYLATE 5 MILLIGRAM(S): 2.5 TABLET ORAL at 08:17

## 2020-08-11 RX ADMIN — Medication 100 MILLIGRAM(S): at 11:56

## 2020-08-11 RX ADMIN — Medication 75 MICROGRAM(S): at 08:17

## 2020-08-11 RX ADMIN — Medication 200 MILLIGRAM(S): at 08:17

## 2020-08-11 RX ADMIN — Medication 25 MILLIGRAM(S): at 21:34

## 2020-08-11 RX ADMIN — HEPARIN SODIUM 5000 UNIT(S): 5000 INJECTION INTRAVENOUS; SUBCUTANEOUS at 16:18

## 2020-08-11 RX ADMIN — Medication 40 MILLIGRAM(S): at 08:17

## 2020-08-11 NOTE — CONSULT NOTE ADULT - ASSESSMENT
89F with Hypertension, CKD, gout, DM who presented with left lower extremity non-purulent cellulitis.  She has been afebrile with stable VS  WBC normal as well as normal inflammatory markers  Borders are not well demarcated and have a streak-like appearance commonly seen in strep cellulitis   LE doppler is negative for DVT  She has CKD now with a creatinine of 2.16 and CrCl 17-20   Was on levaquin outpatient and now on cefazolin (renally dosed)  CXR (personally reviewed) shows calcification but no pneumonia   LE xray some soft tissue edema (personally reviewed)   COVID19 PCR nonreactive     Overall, 89F LE non-purulent cellulitis, CKD, DM    #Cellulitis   -Continue Cefazolin 1g q12hrs based on CrCl  -f/u blood cultures from admission  -leg elevation in bed and even when in chair  -plan to switch to Keflex pending improvement and negative blood culture     #CKD   -Creatinine was 1.96 in 2019 now is 2.1  -will dose all antibiotics based on creatinine clearance and follow creatinine while admitted

## 2020-08-11 NOTE — CONSULT NOTE ADULT - SUBJECTIVE AND OBJECTIVE BOX
Patient is a 89y old  Female who presents with a chief complaint of cellulitis (10 Aug 2020 18:49)      HPI:  89 W h/o T2DM on insulin, gout, h/o TIA presents with 1 week c/o rash. Patient states she initially presented to her PMD who prescribed her a course of Levaquin, but this did not improve the rash and she was instructed to go to the ED.    She states her rash involves her L leg. Rash is red and "hot". No fevers. No recent travel. No SOB or waking up SOB. Reports chronic leg swelling of both legs. (10 Aug 2020 18:49)    Above reviewed and agree. She reports about 1 week ago she noticed redness and increased warmth. Denies fevers or chills, no trauma, or bug bites. Took antibiotic but didnt help so came to ED.   ID consulted for workup and antibiotic management     PAST MEDICAL & SURGICAL HISTORY:  Gastroesophageal reflux disease, esophagitis presence not specified  Hypothyroidism, unspecified type  Gout  Hyperlipidemia, unspecified hyperlipidemia type  Diabetes  Hypertension  Osteoarthritis of right hip, unspecified osteoarthritis type: partial Right Hip Replacement 2017  Paraumbilical hernia  S/P Tonsillectomy and Adenoidectomy  S/P Tonsillectomy and Adenoidectomy      Allergies  No Known Allergies        ANTIMICROBIALS:  ceFAZolin   IVPB 1000 every 12 hours      MEDICATIONS  (STANDING):    ceFAZolin   IVPB   100 mL/Hr IV Intermittent (20 @ 11:55)   100 mL/Hr IV Intermittent (08-10-20 @ 22:19)    vancomycin  IVPB   250 mL/Hr IV Intermittent (08-10-20 @ 15:46)        OTHER MEDS: MEDICATIONS  (STANDING):  allopurinol 100 daily  amitriptyline 25 at bedtime  amLODIPine   Tablet 5 daily  atorvastatin 80 at bedtime  clopidogrel Tablet 75 daily  dextrose 40% Gel 15 once PRN  dextrose 50% Injectable 12.5 once  dextrose 50% Injectable 25 once  dextrose 50% Injectable 25 once  furosemide    Tablet 40 daily  glucagon  Injectable 1 once PRN  heparin   Injectable 5000 every 8 hours  insulin glargine Injectable (LANTUS) 24 every morning  insulin lispro (HumaLOG) corrective regimen sliding scale  three times a day before meals  labetalol 200 two times a day  levothyroxine 75 daily  lisinopril 20 daily      SOCIAL HISTORY:     Quit smoking 25 years ago  Has 1 alcoholic beverage per day   No drugs    FAMILY HISTORY:  father had stomach cancer-  Mother  at 51 from CAD/MI    REVIEW OF SYSTEMS  [  ] ROS unobtainable because:    [X ] All other systems negative except as noted below:	    Constitutional:  [ ] fever [ ] chills  [ ] weight loss  [ ] weakness  Skin:  [X ] rash [ ] phlebitis	  Eyes: [ ] icterus [ ] pain  [ ] discharge	  ENMT: [ ] sore throat  [ ] thrush [ ] ulcers [ ] exudates  Respiratory: [ ] dyspnea [ ] hemoptysis [ ] cough [ ] sputum	  Cardiovascular:  [ ] chest pain [ ] palpitations [ X] edema	  Gastrointestinal:  [ ] nausea [ ] vomiting [ ] diarrhea [ ] constipation [ ] pain	  Genitourinary:  [ ] dysuria [ ] frequency [ ] hematuria [ ] discharge [ ] flank pain  [ ] incontinence  Musculoskeletal:  [ ] myalgias [ ] arthralgias [ ] arthritis  [ ] back pain  Neurological:  [ ] headache [ ] seizures  [ ] confusion/altered mental status  Psychiatric:  [ ] anxiety [ ] depression	  Hematology/Lymphatics:  [ ] lymphadenopathy  Endocrine:  [ ] adrenal [ ] thyroid  Allergic/Immunologic:	 [ ] transplant [ ] seasonal    Vital Signs Last 24 Hrs  T(F): 98.1 (20 @ 12:26), Max: 98.8 (08-10-20 @ 23:20)    Vital Signs Last 24 Hrs  HR: 59 (20 @ 12:26) (59 - 79)  BP: 122/58 (20 @ 12:26) (122/58 - 161/57)  RR: 17 (20 @ 12:26)  SpO2: 95% (20 @ 12:26) (95% - 99%)  Wt(kg): --    PHYSICAL EXAM:  Constitutional: non-toxic, no distress, elderly female very pleasant sitting up on bed  HEAD/EYES: anicteric, no conjunctival injection  ENT:  supple, no thrush  Cardiovascular:   normal S1, S2, no murmur, + edema worse in the left LE  Respiratory:  clear BS bilaterally, no wheezes, no rales  GI:  soft, non-tender, normal bowel sounds  :  no mckeon, no CVA tenderness  Musculoskeletal:  no synovitis, normal ROM  Neurologic: awake and alert, normal strength, no focal findings  Skin:  left lower ext with swelling, erythema, very slight increased warmth compared to right, no obvious signs of trauma and no purulence   Heme/Onc: no lymphadenopathy   Psychiatric:  awake, alert, appropriate mood          WBC Count: 7.44 K/uL ( @ 09:39)  WBC Count: 7.75 K/uL (08-10 @ 15:24)                            10.5   7.44  )-----------( 245      ( 11 Aug 2020 09:39 )             33.5           143  |  108  |  34<H>  ----------------------------<  124<H>  3.7   |  27  |  2.16<H>    Ca    8.6      11 Aug 2020 09:39    TPro  6.9  /  Alb  3.2<L>  /  TBili  0.6  /  DBili  x   /  AST  25  /  ALT  32  /  AlkPhos  81  08-10      Creatinine Trend: 2.16<--, 2.21<--        MICROBIOLOGY:    C-Reactive Protein, Serum: <0.10 ()  COVID-19 PCR: NotDetec (08-10-20 @ 17:47)      RADIOLOGY:  US Duplex Venous Lower Ext Ltd, Left (08.10.20 @ 16:33)  IMPRESSION:  No evidence of left lower extremity deep venous thrombosis.    Xray Tibia + Fibula 2 Views, Left (08.10.20 @ 16:28)  There is mild knee degeneration and slight ankle degeneration.    No bone destruction or fracture is evident.    Mild edema at the ankle is seen.    IMPRESSION: As above.          Xray Chest 1 View- PORTABLE-Urgent (08.10.20 @ 16:28) >\  Low lung volumes. No change heart mediastinum. Calcified right paratracheal right hilar lymph nodes. Calcified right basilar pleural plaque. No acute infiltrate or significant pleural effusion. Degenerative spondylosis dorsal spine.    Impression: No active disease

## 2020-08-11 NOTE — PROGRESS NOTE ADULT - SUBJECTIVE AND OBJECTIVE BOX
HPI:  89 W h/o T2DM on insulin, gout, h/o TIA presents with 1 week c/o rash. Patient states she initially presented to her PMD who prescribed her a course of Levaquin, but this did not improve the rash and she was instructed to go to the ED.    She states her rash involves her L leg. Rash is red and "hot". No fevers. No recent travel. No SOB or waking up SOB. Reports chronic leg swelling of both legs. (10 Aug 2020 18:49)    Patient is a 89y old  Female who presents with a chief complaint of cellulitis (11 Aug 2020 13:05)      INTERVAL HPI/OVERNIGHT EVENTS: new admission comfortable     MEDICATIONS  (STANDING):  allopurinol 100 milliGRAM(s) Oral daily  amitriptyline 25 milliGRAM(s) Oral at bedtime  amLODIPine   Tablet 5 milliGRAM(s) Oral daily  atorvastatin 80 milliGRAM(s) Oral at bedtime  ceFAZolin   IVPB 1000 milliGRAM(s) IV Intermittent every 12 hours  clopidogrel Tablet 75 milliGRAM(s) Oral daily  dextrose 5%. 1000 milliLiter(s) (50 mL/Hr) IV Continuous <Continuous>  dextrose 50% Injectable 12.5 Gram(s) IV Push once  dextrose 50% Injectable 25 Gram(s) IV Push once  dextrose 50% Injectable 25 Gram(s) IV Push once  furosemide    Tablet 40 milliGRAM(s) Oral daily  heparin   Injectable 5000 Unit(s) SubCutaneous every 8 hours  insulin glargine Injectable (LANTUS) 24 Unit(s) SubCutaneous every morning  insulin lispro (HumaLOG) corrective regimen sliding scale   SubCutaneous three times a day before meals  labetalol 200 milliGRAM(s) Oral two times a day  levothyroxine 75 MICROGram(s) Oral daily  lisinopril 20 milliGRAM(s) Oral daily  sodium chloride 0.9%. 1000 milliLiter(s) (100 mL/Hr) IV Continuous <Continuous>    MEDICATIONS  (PRN):  dextrose 40% Gel 15 Gram(s) Oral once PRN Blood Glucose LESS THAN 70 milliGRAM(s)/deciliter  glucagon  Injectable 1 milliGRAM(s) IntraMuscular once PRN Glucose LESS THAN 70 milligrams/deciliter      Allergies    No Known Allergies    Intolerances        REVIEW OF SYSTEMS:  CONSTITUTIONAL: No fever, weight loss, or fatigue  EYES: No eye pain, visual disturbances, or discharge  ENMT:  No difficulty hearing, tinnitus, vertigo; No sinus or throat pain  NECK: No pain or stiffness  BREASTS: No pain, masses, or nipple discharge  RESPIRATORY: No cough, wheezing, chills or hemoptysis; No shortness of breath  CARDIOVASCULAR: No chest pain, palpitations, dizziness, or leg swelling  GASTROINTESTINAL: No abdominal or epigastric pain. No nausea, vomiting, or hematemesis; No diarrhea or constipation. No melena or hematochezia.  GENITOURINARY: No dysuria, frequency, hematuria, or incontinence  NEUROLOGICAL: No headaches, memory loss, loss of strength, numbness, or tremors  SKIN: No itching, burning, rashes, or lesions   LYMPH NODES: No enlarged glands  ENDOCRINE: No heat or cold intolerance; No hair loss  MUSCULOSKELETAL: No joint pain or swelling; No muscle, back, or extremity pain  PSYCHIATRIC: No depression, anxiety, mood swings, or difficulty sleeping  HEME/LYMPH: No easy bruising, or bleeding gums  ALLERGY AND IMMUNOLOGIC: No hives or eczema    Vital Signs Last 24 Hrs  T(C): 36.7 (11 Aug 2020 12:26), Max: 37.1 (10 Aug 2020 23:20)  T(F): 98.1 (11 Aug 2020 12:26), Max: 98.8 (10 Aug 2020 23:20)  HR: 59 (11 Aug 2020 12:26) (59 - 79)  BP: 122/58 (11 Aug 2020 12:26) (122/58 - 161/57)  BP(mean): --  RR: 17 (11 Aug 2020 12:26) (15 - 18)  SpO2: 95% (11 Aug 2020 12:26) (95% - 99%)    PHYSICAL EXAM:  GENERAL: NAD, well-groomed, well-developed  HEAD:  Atraumatic, Normocephalic  EYES: EOMI, PERRLA, conjunctiva and sclera clear  ENMT: No tonsillar erythema, exudates, or enlargement; Moist mucous membranes, Good dentition, No lesions  NECK: Supple, No JVD, Normal thyroid  NERVOUS SYSTEM:  Alert & Oriented X3, Good concentration; Motor Strength 5/5 B/L upper and lower extremities; DTRs 2+ intact and symmetric  CHEST/LUNG: Clear to percussion bilaterally; No rales, rhonchi, wheezing, or rubs  HEART: Regular rate and rhythm; No murmurs, rubs, or gallops  ABDOMEN: Soft, Nontender, Nondistended; Bowel sounds present  EXTREMITIES: Left leg cellulitis warm to touch   LYMPH: No lymphadenopathy noted  SKIN: No rashes or lesions    LABS:                        10.5   7.44  )-----------( 245      ( 11 Aug 2020 09:39 )             33.5     08-11    143  |  108  |  34<H>  ----------------------------<  124<H>  3.7   |  27  |  2.16<H>    Ca    8.6      11 Aug 2020 09:39    TPro  6.9  /  Alb  3.2<L>  /  TBili  0.6  /  DBili  x   /  AST  25  /  ALT  32  /  AlkPhos  81  08-10    PT/INR - ( 10 Aug 2020 15:24 )   PT: 12.7 sec;   INR: 1.10 ratio         PTT - ( 10 Aug 2020 15:24 )  PTT:30.1 sec    CAPILLARY BLOOD GLUCOSE      POCT Blood Glucose.: 124 mg/dL (11 Aug 2020 10:56)  POCT Blood Glucose.: 127 mg/dL (11 Aug 2020 08:43)  POCT Blood Glucose.: 95 mg/dL (11 Aug 2020 07:23)  POCT Blood Glucose.: 112 mg/dL (10 Aug 2020 22:24)      RADIOLOGY & ADDITIONAL TESTS:  < from: US Duplex Venous Lower Ext Ltd, Left (08.10.20 @ 16:33) >    EXAM:  US DPLX LWR EXT VEINS LTD LT                            PROCEDURE DATE:  08/10/2020          INTERPRETATION:  CLINICAL INFORMATION: Left lower extremity swelling    COMPARISON: None available.    TECHNIQUE: Duplex sonography of the LEFT LOWER extremity veins with color and spectral Doppler, with and without compression.    FINDINGS:    There is normal compressibility of the left common femoral, femoral and popliteal veins.  The contralateral common femoral vein is patent.  Doppler examination shows normal spontaneous and phasic flow.    No calf vein thrombosis is detected.    IMPRESSION:  No evidence of left lower extremity deep venous thrombosis.      < from: Xray Tibia + Fibula 2 Views, Left (08.10.20 @ 16:28) >  EXAM:  TIBIA _ FIBULA  AP _ LAT LT                            PROCEDURE DATE:  08/10/2020          INTERPRETATION:  Tib-fib. 2 views. Patient has local infection.    There is mild knee degeneration and slight ankle degeneration.    No bone destruction or fracture is evident.    Mild edema at the ankle is seen.    IMPRESSION: As above.    < end of copied text >    Imaging Personally Reviewed:  [ X] YES  [ ] NO    Consultant(s) Notes Reviewed:  [x ] YES  [ ] NO    Care Discussed with Consultants/Other Providers [ X] YES  [ ] NO

## 2020-08-11 NOTE — CONSULT NOTE ADULT - ATTENDING COMMENTS
Will continue to follow along with you  Thank you for the courtesy of this consultation     Aldo Bauer DO  Cell: [phone]  Pager [phone]  Infectious Disease Attending  After 5pm/weekends please call [phone] for all inquiries and new consults

## 2020-08-12 DIAGNOSIS — N17.9 ACUTE KIDNEY FAILURE, UNSPECIFIED: ICD-10-CM

## 2020-08-12 DIAGNOSIS — E87.6 HYPOKALEMIA: ICD-10-CM

## 2020-08-12 LAB
ANION GAP SERPL CALC-SCNC: 6 MMOL/L — SIGNIFICANT CHANGE UP (ref 5–17)
BUN SERPL-MCNC: 31 MG/DL — HIGH (ref 7–23)
CALCIUM SERPL-MCNC: 7.9 MG/DL — LOW (ref 8.5–10.1)
CHLORIDE SERPL-SCNC: 111 MMOL/L — HIGH (ref 96–108)
CO2 SERPL-SCNC: 25 MMOL/L — SIGNIFICANT CHANGE UP (ref 22–31)
CREAT SERPL-MCNC: 1.83 MG/DL — HIGH (ref 0.5–1.3)
GLUCOSE BLDC GLUCOMTR-MCNC: 108 MG/DL — HIGH (ref 70–99)
GLUCOSE BLDC GLUCOMTR-MCNC: 111 MG/DL — HIGH (ref 70–99)
GLUCOSE BLDC GLUCOMTR-MCNC: 134 MG/DL — HIGH (ref 70–99)
GLUCOSE BLDC GLUCOMTR-MCNC: 182 MG/DL — HIGH (ref 70–99)
GLUCOSE SERPL-MCNC: 121 MG/DL — HIGH (ref 70–99)
HCT VFR BLD CALC: 30.8 % — LOW (ref 34.5–45)
HGB BLD-MCNC: 9.8 G/DL — LOW (ref 11.5–15.5)
MCHC RBC-ENTMCNC: 28.6 PG — SIGNIFICANT CHANGE UP (ref 27–34)
MCHC RBC-ENTMCNC: 31.8 GM/DL — LOW (ref 32–36)
MCV RBC AUTO: 89.8 FL — SIGNIFICANT CHANGE UP (ref 80–100)
NRBC # BLD: 0 /100 WBCS — SIGNIFICANT CHANGE UP (ref 0–0)
PLATELET # BLD AUTO: 218 K/UL — SIGNIFICANT CHANGE UP (ref 150–400)
POTASSIUM SERPL-MCNC: 3.3 MMOL/L — LOW (ref 3.5–5.3)
POTASSIUM SERPL-SCNC: 3.3 MMOL/L — LOW (ref 3.5–5.3)
RBC # BLD: 3.43 M/UL — LOW (ref 3.8–5.2)
RBC # FLD: 15.6 % — HIGH (ref 10.3–14.5)
SODIUM SERPL-SCNC: 142 MMOL/L — SIGNIFICANT CHANGE UP (ref 135–145)
WBC # BLD: 6.62 K/UL — SIGNIFICANT CHANGE UP (ref 3.8–10.5)
WBC # FLD AUTO: 6.62 K/UL — SIGNIFICANT CHANGE UP (ref 3.8–10.5)

## 2020-08-12 PROCEDURE — 99233 SBSQ HOSP IP/OBS HIGH 50: CPT

## 2020-08-12 PROCEDURE — 99232 SBSQ HOSP IP/OBS MODERATE 35: CPT

## 2020-08-12 RX ORDER — LACTOBACILLUS ACIDOPHILUS 100MM CELL
1 CAPSULE ORAL
Refills: 0 | Status: DISCONTINUED | OUTPATIENT
Start: 2020-08-12 | End: 2020-08-13

## 2020-08-12 RX ORDER — POTASSIUM CHLORIDE 20 MEQ
40 PACKET (EA) ORAL ONCE
Refills: 0 | Status: COMPLETED | OUTPATIENT
Start: 2020-08-12 | End: 2020-08-12

## 2020-08-12 RX ADMIN — INSULIN GLARGINE 24 UNIT(S): 100 INJECTION, SOLUTION SUBCUTANEOUS at 08:07

## 2020-08-12 RX ADMIN — Medication 40 MILLIEQUIVALENT(S): at 11:49

## 2020-08-12 RX ADMIN — Medication 25 MILLIGRAM(S): at 21:04

## 2020-08-12 RX ADMIN — ATORVASTATIN CALCIUM 80 MILLIGRAM(S): 80 TABLET, FILM COATED ORAL at 21:04

## 2020-08-12 RX ADMIN — HEPARIN SODIUM 5000 UNIT(S): 5000 INJECTION INTRAVENOUS; SUBCUTANEOUS at 21:05

## 2020-08-12 RX ADMIN — Medication 1 TABLET(S): at 17:25

## 2020-08-12 RX ADMIN — LISINOPRIL 20 MILLIGRAM(S): 2.5 TABLET ORAL at 08:47

## 2020-08-12 RX ADMIN — Medication 40 MILLIGRAM(S): at 08:46

## 2020-08-12 RX ADMIN — Medication 200 MILLIGRAM(S): at 08:46

## 2020-08-12 RX ADMIN — Medication 200 MILLIGRAM(S): at 17:25

## 2020-08-12 RX ADMIN — Medication 100 MILLIGRAM(S): at 21:04

## 2020-08-12 RX ADMIN — Medication 100 MILLIGRAM(S): at 11:54

## 2020-08-12 RX ADMIN — Medication 2: at 11:52

## 2020-08-12 RX ADMIN — HEPARIN SODIUM 5000 UNIT(S): 5000 INJECTION INTRAVENOUS; SUBCUTANEOUS at 14:44

## 2020-08-12 RX ADMIN — HEPARIN SODIUM 5000 UNIT(S): 5000 INJECTION INTRAVENOUS; SUBCUTANEOUS at 08:47

## 2020-08-12 RX ADMIN — Medication 100 MILLIGRAM(S): at 08:52

## 2020-08-12 RX ADMIN — CLOPIDOGREL BISULFATE 75 MILLIGRAM(S): 75 TABLET, FILM COATED ORAL at 11:49

## 2020-08-12 RX ADMIN — Medication 75 MICROGRAM(S): at 08:46

## 2020-08-12 RX ADMIN — AMLODIPINE BESYLATE 5 MILLIGRAM(S): 2.5 TABLET ORAL at 08:46

## 2020-08-12 NOTE — PROGRESS NOTE ADULT - SUBJECTIVE AND OBJECTIVE BOX
Patient is a 89y old  Female who presents with a chief complaint of cellulitis (12 Aug 2020 10:32)      OVERNIGHT EVENTS:  none     REVIEW OF SYSTEMS: denies chest pain/SOB, diaphoresis, no F/C, cough, dizziness, headache, blurry vision, nausea, vomiting, abdominal pain. All others review of systems negative     MEDICATIONS  (STANDING):  allopurinol 100 milliGRAM(s) Oral daily  amitriptyline 25 milliGRAM(s) Oral at bedtime  amLODIPine   Tablet 5 milliGRAM(s) Oral daily  atorvastatin 80 milliGRAM(s) Oral at bedtime  ceFAZolin   IVPB 1000 milliGRAM(s) IV Intermittent every 12 hours  clopidogrel Tablet 75 milliGRAM(s) Oral daily  dextrose 5%. 1000 milliLiter(s) (50 mL/Hr) IV Continuous <Continuous>  dextrose 50% Injectable 12.5 Gram(s) IV Push once  dextrose 50% Injectable 25 Gram(s) IV Push once  dextrose 50% Injectable 25 Gram(s) IV Push once  furosemide    Tablet 40 milliGRAM(s) Oral daily  heparin   Injectable 5000 Unit(s) SubCutaneous every 8 hours  insulin glargine Injectable (LANTUS) 24 Unit(s) SubCutaneous every morning  insulin lispro (HumaLOG) corrective regimen sliding scale   SubCutaneous three times a day before meals  labetalol 200 milliGRAM(s) Oral two times a day  lactobacillus acidophilus 1 Tablet(s) Oral two times a day with meals  levothyroxine 75 MICROGram(s) Oral daily  lisinopril 20 milliGRAM(s) Oral daily  sodium chloride 0.9%. 1000 milliLiter(s) (100 mL/Hr) IV Continuous <Continuous>    MEDICATIONS  (PRN):  dextrose 40% Gel 15 Gram(s) Oral once PRN Blood Glucose LESS THAN 70 milliGRAM(s)/deciliter  glucagon  Injectable 1 milliGRAM(s) IntraMuscular once PRN Glucose LESS THAN 70 milligrams/deciliter      Allergies    No Known Allergies    Intolerances        T(F): 99 (08-12-20 @ 11:32), Max: 99 (08-12-20 @ 11:32)  HR: 68 (08-12-20 @ 11:32) (66 - 72)  BP: 144/64 (08-12-20 @ 11:32) (136/62 - 146/62)  RR: 17 (08-12-20 @ 11:32) (17 - 18)  SpO2: 95% (08-12-20 @ 11:32) (93% - 95%)  Wt(kg): --    PHYSICAL EXAM:  GENERAL: NAD  HEAD:  Atraumatic, Normocephalic  EYES: PERRLA, conjunctiva and sclera clear  ENMT: Moist mucous membranes  NECK: Supple, No JVD, Normal thyroid  NERVOUS SYSTEM:  Alert & Awake  CHEST/LUNG: Clear to percussion bilaterally;   HEART: Regular rate and rhythm;   ABDOMEN: Soft, Nontender, Nondistended; Bowel sounds present  EXTREMITIES:  no edema BL LE  SKIN: moist    LABS:                        9.8    6.62  )-----------( 218      ( 12 Aug 2020 07:19 )             30.8     08-12    142  |  111<H>  |  31<H>  ----------------------------<  121<H>  3.3<L>   |  25  |  1.83<H>    Ca    7.9<L>      12 Aug 2020 07:19    TPro  6.9  /  Alb  3.2<L>  /  TBili  0.6  /  DBili  x   /  AST  25  /  ALT  32  /  AlkPhos  81  08-10    PT/INR - ( 10 Aug 2020 15:24 )   PT: 12.7 sec;   INR: 1.10 ratio         PTT - ( 10 Aug 2020 15:24 )  PTT:30.1 sec    Cultures;   CAPILLARY BLOOD GLUCOSE      POCT Blood Glucose.: 182 mg/dL (12 Aug 2020 10:42)  POCT Blood Glucose.: 134 mg/dL (12 Aug 2020 07:28)  POCT Blood Glucose.: 121 mg/dL (11 Aug 2020 21:29)  POCT Blood Glucose.: 101 mg/dL (11 Aug 2020 15:44)    Lipid panel:           RADIOLOGY & ADDITIONAL TESTS:    Imaging Personally Reviewed:  [x ] YES      Consultant(s) Notes Reviewed:  [x ] YES     Care Discussed with [x ] Consultants [X ] Patient [ ] Family  [x ]    [x ]  Other; RN

## 2020-08-12 NOTE — PROGRESS NOTE ADULT - SUBJECTIVE AND OBJECTIVE BOX
MARCE WALTERS  MRN-[id number]    Follow Up:      Interval History:    ROS:    [ ] Unobtainable because:  [ ] All other systems negative    Constitutional: no fever, no chills  Head: no trauma  Eyes: no vision changes, no eye pain  ENT:  no sore throat, no rhinorrhea  Cardiovascular:  no chest pain, no palpitation  Respiratory:  no SOB, no cough  GI:  no abd pain, no vomiting, no diarrhea  urinary: no dysuria, no hematuria, no flank pain  musculoskeletal:  no joint pain, no joint swelling  skin:  no rash  neurology:  no headache, no seizure, no change in mental status  psych: no anxiety, no depression         Allergies  No Known Allergies        ANTIMICROBIALS:  ceFAZolin   IVPB 1000 every 12 hours      OTHER MEDS:  allopurinol 100 milliGRAM(s) Oral daily  amitriptyline 25 milliGRAM(s) Oral at bedtime  amLODIPine   Tablet 5 milliGRAM(s) Oral daily  atorvastatin 80 milliGRAM(s) Oral at bedtime  clopidogrel Tablet 75 milliGRAM(s) Oral daily  dextrose 40% Gel 15 Gram(s) Oral once PRN  dextrose 5%. 1000 milliLiter(s) IV Continuous <Continuous>  dextrose 50% Injectable 12.5 Gram(s) IV Push once  dextrose 50% Injectable 25 Gram(s) IV Push once  dextrose 50% Injectable 25 Gram(s) IV Push once  furosemide    Tablet 40 milliGRAM(s) Oral daily  glucagon  Injectable 1 milliGRAM(s) IntraMuscular once PRN  heparin   Injectable 5000 Unit(s) SubCutaneous every 8 hours  insulin glargine Injectable (LANTUS) 24 Unit(s) SubCutaneous every morning  insulin lispro (HumaLOG) corrective regimen sliding scale   SubCutaneous three times a day before meals  labetalol 200 milliGRAM(s) Oral two times a day  levothyroxine 75 MICROGram(s) Oral daily  lisinopril 20 milliGRAM(s) Oral daily  sodium chloride 0.9%. 1000 milliLiter(s) IV Continuous <Continuous>      Vital Signs Last 24 Hrs  T(C): 36.8 (12 Aug 2020 07:54), Max: 36.9 (11 Aug 2020 23:34)  T(F): 98.3 (12 Aug 2020 07:54), Max: 98.5 (11 Aug 2020 23:34)  HR: 70 (12 Aug 2020 07:54) (59 - 72)  BP: 146/62 (12 Aug 2020 07:54) (122/58 - 146/62)  BP(mean): --  RR: 18 (12 Aug 2020 07:54) (17 - 18)  SpO2: 93% (12 Aug 2020 07:54) (93% - 95%)    Physical Exam:  General:    NAD,  non toxic  Head: atraumatic, normocephalic  Eye: normal sclera and conjunctiva  ENT:    no oral lesions, neck supple  Cardio:     regular S1, S2,  no murmur  Respiratory:    clear b/l,    no wheezing  abd:     soft,   BS +,   no tenderness  :   no CVAT,  no suprapubic tenderness,   no  mckeon  Musculoskeletal:   no joint swelling,   no edema  vascular: no central lines, +PIV   Skin:    no rash  Neurologic:     no focal deficit  psych: normal affect    WBC Count: 6.62 K/uL (08-12 @ 07:19)  WBC Count: 7.44 K/uL (08-11 @ 09:39)  WBC Count: 7.75 K/uL (08-10 @ 15:24)                            9.8    6.62  )-----------( 218      ( 12 Aug 2020 07:19 )             30.8       08-12    142  |  111<H>  |  31<H>  ----------------------------<  121<H>  3.3<L>   |  25  |  1.83<H>    Ca    7.9<L>      12 Aug 2020 07:19    TPro  6.9  /  Alb  3.2<L>  /  TBili  0.6  /  DBili  x   /  AST  25  /  ALT  32  /  AlkPhos  81  08-10          Creatinine Trend: 1.83<--, 2.16<--, 2.21<--      MICROBIOLOGY:  v  .Blood Blood-Peripheral  08-11-20   No growth to date.  --  --      .Blood Blood-Peripheral  08-11-20   No growth to date.  --  --        C-Reactive Protein, Serum: <0.10 (08-11)          COVID-19 PCR: NotDetec (08-10-20 @ 17:47)      RADIOLOGY: SELENAMARCE  MRN-[id number]    Follow Up:  ID following for cellulitis     Interval History: Pt seen and examined. She is feeling well and reports improvement in her leg edema and redness. She is tolerating the antibiotics and denies any other complaints     ROS:    [ ] Unobtainable because:  [X ] All other systems negative    Constitutional: no fever, no chills  Head: no trauma  Eyes: no vision changes, no eye pain  ENT:  no sore throat, no rhinorrhea  Cardiovascular:  no chest pain, no palpitation  Respiratory:  no SOB, no cough  GI:  no abd pain, no vomiting, no diarrhea  urinary: no dysuria, no hematuria, no flank pain  musculoskeletal:  no joint pain, no joint swelling  skin:  no rash  neurology:  no headache, no seizure, no change in mental status  psych: no anxiety, no depression         Allergies  No Known Allergies        ANTIMICROBIALS:  ceFAZolin   IVPB 1000 every 12 hours      OTHER MEDS:  allopurinol 100 milliGRAM(s) Oral daily  amitriptyline 25 milliGRAM(s) Oral at bedtime  amLODIPine   Tablet 5 milliGRAM(s) Oral daily  atorvastatin 80 milliGRAM(s) Oral at bedtime  clopidogrel Tablet 75 milliGRAM(s) Oral daily  dextrose 40% Gel 15 Gram(s) Oral once PRN  dextrose 5%. 1000 milliLiter(s) IV Continuous <Continuous>  dextrose 50% Injectable 12.5 Gram(s) IV Push once  dextrose 50% Injectable 25 Gram(s) IV Push once  dextrose 50% Injectable 25 Gram(s) IV Push once  furosemide    Tablet 40 milliGRAM(s) Oral daily  glucagon  Injectable 1 milliGRAM(s) IntraMuscular once PRN  heparin   Injectable 5000 Unit(s) SubCutaneous every 8 hours  insulin glargine Injectable (LANTUS) 24 Unit(s) SubCutaneous every morning  insulin lispro (HumaLOG) corrective regimen sliding scale   SubCutaneous three times a day before meals  labetalol 200 milliGRAM(s) Oral two times a day  levothyroxine 75 MICROGram(s) Oral daily  lisinopril 20 milliGRAM(s) Oral daily  sodium chloride 0.9%. 1000 milliLiter(s) IV Continuous <Continuous>      Vital Signs Last 24 Hrs  T(C): 36.8 (12 Aug 2020 07:54), Max: 36.9 (11 Aug 2020 23:34)  T(F): 98.3 (12 Aug 2020 07:54), Max: 98.5 (11 Aug 2020 23:34)  HR: 70 (12 Aug 2020 07:54) (59 - 72)  BP: 146/62 (12 Aug 2020 07:54) (122/58 - 146/62)  BP(mean): --  RR: 18 (12 Aug 2020 07:54) (17 - 18)  SpO2: 93% (12 Aug 2020 07:54) (93% - 95%)    Physical Exam:  General:    NAD,  non toxic  Head: atraumatic, normocephalic  Eye: normal sclera and conjunctiva  ENT:    no oral lesions, neck supple  Cardio:     regular S1, S2,  no murmur  Respiratory:    clear b/l,    no wheezing  abd:     soft,   BS +,   no tenderness  :   no CVAT,  no suprapubic tenderness,   + external mckeon  Musculoskeletal:   no joint swelling,   +edema  vascular: no central lines, +PIV   Skin:    LLE with fading cellulitis-less erythematous and less warm   Neurologic:     no focal deficit  psych: normal affect    WBC Count: 6.62 K/uL (08-12 @ 07:19)  WBC Count: 7.44 K/uL (08-11 @ 09:39)  WBC Count: 7.75 K/uL (08-10 @ 15:24)                            9.8    6.62  )-----------( 218      ( 12 Aug 2020 07:19 )             30.8       08-12    142  |  111<H>  |  31<H>  ----------------------------<  121<H>  3.3<L>   |  25  |  1.83<H>    Ca    7.9<L>      12 Aug 2020 07:19    TPro  6.9  /  Alb  3.2<L>  /  TBili  0.6  /  DBili  x   /  AST  25  /  ALT  32  /  AlkPhos  81  08-10          Creatinine Trend: 1.83<--, 2.16<--, 2.21<--      MICROBIOLOGY:  v  .Blood Blood-Peripheral  08-11-20   No growth to date.  --  --      .Blood Blood-Peripheral  08-11-20   No growth to date.  --  --        C-Reactive Protein, Serum: <0.10 (08-11)          COVID-19 PCR: NotDetec (08-10-20 @ 17:47)      RADIOLOGY:

## 2020-08-12 NOTE — PHYSICAL THERAPY INITIAL EVALUATION ADULT - ADDITIONAL COMMENTS
Pt lives in a private house with 2 steps to enter. Pt's daughter lives upstairs. Pt ambulates with cane, but owns a rollator. Pt's daughter assists with community activities and ambulation. Pt I with ADLs.

## 2020-08-13 ENCOUNTER — TRANSCRIPTION ENCOUNTER (OUTPATIENT)
Age: 85
End: 2020-08-13

## 2020-08-13 VITALS
RESPIRATION RATE: 18 BRPM | OXYGEN SATURATION: 95 % | SYSTOLIC BLOOD PRESSURE: 123 MMHG | HEART RATE: 60 BPM | TEMPERATURE: 99 F | DIASTOLIC BLOOD PRESSURE: 69 MMHG

## 2020-08-13 LAB
ANION GAP SERPL CALC-SCNC: 7 MMOL/L — SIGNIFICANT CHANGE UP (ref 5–17)
BUN SERPL-MCNC: 28 MG/DL — HIGH (ref 7–23)
CALCIUM SERPL-MCNC: 8.4 MG/DL — LOW (ref 8.5–10.1)
CHLORIDE SERPL-SCNC: 110 MMOL/L — HIGH (ref 96–108)
CO2 SERPL-SCNC: 27 MMOL/L — SIGNIFICANT CHANGE UP (ref 22–31)
CREAT SERPL-MCNC: 2.05 MG/DL — HIGH (ref 0.5–1.3)
GLUCOSE BLDC GLUCOMTR-MCNC: 102 MG/DL — HIGH (ref 70–99)
GLUCOSE BLDC GLUCOMTR-MCNC: 114 MG/DL — HIGH (ref 70–99)
GLUCOSE BLDC GLUCOMTR-MCNC: 153 MG/DL — HIGH (ref 70–99)
GLUCOSE SERPL-MCNC: 94 MG/DL — SIGNIFICANT CHANGE UP (ref 70–99)
POTASSIUM SERPL-MCNC: 3.6 MMOL/L — SIGNIFICANT CHANGE UP (ref 3.5–5.3)
POTASSIUM SERPL-SCNC: 3.6 MMOL/L — SIGNIFICANT CHANGE UP (ref 3.5–5.3)
SODIUM SERPL-SCNC: 144 MMOL/L — SIGNIFICANT CHANGE UP (ref 135–145)

## 2020-08-13 PROCEDURE — 99232 SBSQ HOSP IP/OBS MODERATE 35: CPT

## 2020-08-13 PROCEDURE — 99239 HOSP IP/OBS DSCHRG MGMT >30: CPT

## 2020-08-13 RX ORDER — LACTOBACILLUS ACIDOPHILUS 100MM CELL
1 CAPSULE ORAL
Qty: 10 | Refills: 0
Start: 2020-08-13 | End: 2020-08-22

## 2020-08-13 RX ORDER — CEPHALEXIN 500 MG
1 CAPSULE ORAL
Qty: 21 | Refills: 0
Start: 2020-08-13 | End: 2020-08-19

## 2020-08-13 RX ADMIN — AMLODIPINE BESYLATE 5 MILLIGRAM(S): 2.5 TABLET ORAL at 05:54

## 2020-08-13 RX ADMIN — Medication 100 MILLIGRAM(S): at 11:06

## 2020-08-13 RX ADMIN — CLOPIDOGREL BISULFATE 75 MILLIGRAM(S): 75 TABLET, FILM COATED ORAL at 11:05

## 2020-08-13 RX ADMIN — LISINOPRIL 20 MILLIGRAM(S): 2.5 TABLET ORAL at 05:54

## 2020-08-13 RX ADMIN — Medication 100 MILLIGRAM(S): at 07:54

## 2020-08-13 RX ADMIN — Medication 40 MILLIGRAM(S): at 05:54

## 2020-08-13 RX ADMIN — HEPARIN SODIUM 5000 UNIT(S): 5000 INJECTION INTRAVENOUS; SUBCUTANEOUS at 05:54

## 2020-08-13 RX ADMIN — INSULIN GLARGINE 24 UNIT(S): 100 INJECTION, SOLUTION SUBCUTANEOUS at 07:52

## 2020-08-13 RX ADMIN — Medication 200 MILLIGRAM(S): at 05:54

## 2020-08-13 RX ADMIN — Medication 1 TABLET(S): at 07:52

## 2020-08-13 RX ADMIN — Medication 75 MICROGRAM(S): at 05:54

## 2020-08-13 NOTE — PROGRESS NOTE ADULT - ASSESSMENT
89 yo F hx IDDM2, HTN, HLD, CHF on lasix, sent in by PCP for cellulitis that failed outpatient antibiotics. Patient endorses increased swelling warmth and pain to Left calf over the past day, in setting of one week of leg pain and swelling. Denies fevers/chill.s Reports similar episode back in March. No numbness/tingling.    Appreciate ID consult may be strep cellulitis
89 yo F hx IDDM2, HTN, HLD, CHF on lasix, sent in by PCP for cellulitis that failed outpatient antibiotics. Patient endorses increased swelling warmth and pain to Left calf over the past day, in setting of one week of leg pain and swelling. Denies fevers/chill.s Reports similar episode back in March. No numbness/tingling.  Admitted for strep cellulitis
89F with Hypertension, CKD, gout, DM who presented with left lower extremity non-purulent cellulitis.  She has been afebrile with stable VS  WBC normal as well as normal inflammatory markers  Borders are not well demarcated and have a streak-like appearance commonly seen in strep cellulitis   LE doppler is negative for DVT  She has CKD now with a creatinine of 2.16 and CrCl 17-20   Was on levaquin outpatient and now on cefazolin (renally dosed)  CXR (personally reviewed) shows calcification but no pneumonia   LE xray some soft tissue edema (personally reviewed)   COVID19 PCR nonreactive     Overall, 89F LE non-purulent cellulitis, CKD, DM    #Cellulitis   -She appears ready for discharge   -blood cultures from admission NGTD  -leg elevation in bed and even when in chair even at home   -can d/c on PO Keflex 250mg q8hrs (this is renally dosed) through 8/19/20     #CKD   -Creatinine was 1.96 in 2019 now is 2.1->1.83->2.0  -will dose all antibiotics based on creatinine clearance and follow creatinine while admitted     #DM  -on insulin at home and her inpatient   -management per primary team   -glucose control important for healing
89F with Hypertension, CKD, gout, DM who presented with left lower extremity non-purulent cellulitis.  She has been afebrile with stable VS  WBC normal as well as normal inflammatory markers  Borders are not well demarcated and have a streak-like appearance commonly seen in strep cellulitis   LE doppler is negative for DVT  She has CKD now with a creatinine of 2.16 and CrCl 17-20   Was on levaquin outpatient and now on cefazolin (renally dosed)  CXR (personally reviewed) shows calcification but no pneumonia   LE xray some soft tissue edema (personally reviewed)   COVID19 PCR nonreactive     Overall, 89F LE non-purulent cellulitis, CKD, DM    #Cellulitis   -Continue Cefazolin 1g q12hrs based on CrCl  -blood cultures from admission NGTD  -leg elevation in bed and even when in chair  -at least one more day of IV with plan to transition to PO if continues to improve     #CKD   -Creatinine was 1.96 in 2019 now is 2.1->1.83  -will dose all antibiotics based on creatinine clearance and follow creatinine while admitted     #DM  -on insulin at home and her inpatient   -management per primary team   -glucose control important for healing

## 2020-08-13 NOTE — DISCHARGE NOTE PROVIDER - NSDCCPCAREPLAN_GEN_ALL_CORE_FT
No PRINCIPAL DISCHARGE DIAGNOSIS  Diagnosis: Cellulitis of left lower extremity  Assessment and Plan of Treatment: Cellulitis of left lower extremity      SECONDARY DISCHARGE DIAGNOSES  Diagnosis: Hypokalemia  Assessment and Plan of Treatment: Hypokalemia    Diagnosis: FLETCHER (acute kidney injury)  Assessment and Plan of Treatment: FLETCHER (acute kidney injury)    Diagnosis: Type 2 diabetes mellitus without complication, without long-term current use of insulin  Assessment and Plan of Treatment: Type 2 diabetes mellitus without complication, without long-term current use of insulin    Diagnosis: Essential hypertension  Assessment and Plan of Treatment: Essential hypertension    Diagnosis: Hypothyroidism, unspecified type  Assessment and Plan of Treatment: Hypothyroidism, unspecified type    Diagnosis: Hyperlipidemia, unspecified hyperlipidemia type  Assessment and Plan of Treatment: Hyperlipidemia, unspecified hyperlipidemia type    Diagnosis: Gastroesophageal reflux disease, esophagitis presence not specified  Assessment and Plan of Treatment: Gastroesophageal reflux disease, esophagitis presence not specified

## 2020-08-13 NOTE — DISCHARGE NOTE PROVIDER - NSDCMRMEDTOKEN_GEN_ALL_CORE_FT
allopurinol 300 mg oral tablet: 1 tab(s) orally once a day  amitriptyline 25 mg oral tablet: 1 tab(s) orally once a day (at bedtime)  amLODIPine 5 mg oral tablet: 1 tab(s) orally once a day  atorvastatin 80 mg oral tablet: 1 tab(s) orally once a day  benazepril 20 mg oral tablet: 1 tab(s) orally once a day  furosemide 40 mg oral tablet: 1 tab(s) orally once a day  Keflex 250 mg oral capsule: 1 cap(s) orally every 8 hours   labetalol 200 mg oral tablet: 1 tab(s) orally 2 times a day  lactobacillus acidophilus oral capsule: 1 tab(s) orally once a day   Lantus Solostar Pen 100 units/mL subcutaneous solution: 24 unit(s) in am   levothyroxine 75 mcg (0.075 mg) oral tablet: 1 tab(s) orally once a day  Plavix 75 mg oral tablet: 1 tab(s) orally once a day

## 2020-08-13 NOTE — PROGRESS NOTE ADULT - ATTENDING COMMENTS
Aldo Bauer DO  Cell: [phone]  Pager [phone]  Infectious Disease Attending  After 5pm/weekends please call [phone] for all inquiries and new consults
Aldo Bauer DO  Cell: [phone]  Pager [phone]  Infectious Disease Attending  After 5pm/weekends please call [phone] for all inquiries and new consults

## 2020-08-13 NOTE — DISCHARGE NOTE NURSING/CASE MANAGEMENT/SOCIAL WORK - PATIENT PORTAL LINK FT
You can access the FollowMyHealth Patient Portal offered by Health system by registering at the following website: [email or web address]. By joining [employer]’s FollowMyHealth portal, you will also be able to view your health information using other applications (apps) compatible with our system.

## 2020-08-13 NOTE — PROGRESS NOTE ADULT - SUBJECTIVE AND OBJECTIVE BOX
MARCE WALTERS  MRN-[id number]    Follow Up:  ID following for cellulitis     Interval History: Pt seen and examined. She is feeling well and reports improvement in her leg edema and redness. She wants to go home.     ROS:    [ ] Unobtainable because:  [X ] All other systems negative    Constitutional: no fever, no chills  Head: no trauma  Eyes: no vision changes, no eye pain  ENT:  no sore throat, no rhinorrhea  Cardiovascular:  no chest pain, no palpitation  Respiratory:  no SOB, no cough  GI:  no abd pain, no vomiting, no diarrhea  urinary: no dysuria, no hematuria, no flank pain  musculoskeletal:  no joint pain, no joint swelling  skin:  no rash  neurology:  no headache, no seizure, no change in mental status  psych: no anxiety, no depression         Allergies  No Known Allergies        ANTIMICROBIALS:  ceFAZolin   IVPB 1000 every 12 hours      MEDICATIONS  (STANDING):  allopurinol 100 daily  amitriptyline 25 at bedtime  amLODIPine   Tablet 5 daily  atorvastatin 80 at bedtime  clopidogrel Tablet 75 daily  dextrose 50% Injectable 12.5 once  dextrose 50% Injectable 25 once  dextrose 50% Injectable 25 once  furosemide    Tablet 40 daily  heparin   Injectable 5000 every 8 hours  insulin glargine Injectable (LANTUS) 24 every morning  insulin lispro (HumaLOG) corrective regimen sliding scale  three times a day before meals  labetalol 200 two times a day  levothyroxine 75 daily  lisinopril 20 daily      PRN  dextrose 40% Gel 15 Gram(s) Oral once PRN  glucagon  Injectable 1 milliGRAM(s) IntraMuscular once PRN      Vital Signs Last 24 Hrs  T(F): 98.7 (08-13-20 @ 11:13), Max: 99.7 (08-12-20 @ 16:41)  HR: 60 (08-13-20 @ 11:13)  BP: 123/69 (08-13-20 @ 11:13)  RR: 18 (08-13-20 @ 11:13)  SpO2: 95% (08-13-20 @ 11:13) (95% - 95%)  Wt(kg): --    Physical Exam:  General:    NAD,  non toxic  Head: atraumatic, normocephalic  Eye: normal sclera and conjunctiva  ENT:    no oral lesions, neck supple  Cardio:     regular S1, S2,  no murmur  Respiratory:    clear b/l,    no wheezing  abd:     soft,   BS +,   no tenderness  :   no CVAT,  no suprapubic tenderness,     Musculoskeletal:   no joint swelling,   +edema  vascular: no central lines, +PIV   Skin:    LLE with fading cellulitis-less erythematous and less warm with continued improvement  Neurologic:     no focal deficit  psych: normal affect    WBC Count: 6.62 K/uL (08-12 @ 07:19)  WBC Count: 7.44 K/uL (08-11 @ 09:39)  WBC Count: 7.75 K/uL (08-10 @ 15:24)                            9.8    6.62  )-----------( 218      ( 12 Aug 2020 07:19 )             30.8       08-13    144  |  110<H>  |  28<H>  ----------------------------<  94  3.6   |  27  |  2.05<H>    Ca    8.4<L>      13 Aug 2020 06:51        Creatinine Trend: 2.05<--, 1.83<--, 2.16<--, 2.21<--        MICROBIOLOGY:  v  .Blood Blood-Peripheral  08-11-20   No growth to date.  --  --      .Blood Blood-Peripheral  08-11-20   No growth to date.  --  --        C-Reactive Protein, Serum: <0.10 (08-11)          COVID-19 PCR: NotDetec (08-10-20 @ 17:47)      RADIOLOGY:

## 2020-08-13 NOTE — DISCHARGE NOTE PROVIDER - HOSPITAL COURSE
89 yo F hx IDDM2, HTN, HLD, CHF on lasix, sent in by PCP for cellulitis that failed outpatient antibiotics. Patient endorses increased swelling warmth and pain to Left calf over the past day, in setting of one week of leg pain and swelling. Denies fevers/chill.s Reports similar episode back in March. No numbness/tingling.    Admitted for strep cellulitis. Symptoms improved. Stable for d/c home on keflex and f/up with pmd in 1 wk 89 yo F hx IDDM2, HTN, HLD, BL LE edema on lasix, sent in by PCP for cellulitis that failed outpatient antibiotics. Patient endorses increased swelling warmth and pain to Left calf over the past day, in setting of one week of leg pain and swelling. Denies fevers/chill.s Reports similar episode back in March. No numbness/tingling.    Admitted for strep cellulitis. Symptoms improved. Stable for d/c home on keflex and f/up with pmd in 1 wk

## 2020-08-17 ENCOUNTER — APPOINTMENT (OUTPATIENT)
Dept: INTERNAL MEDICINE | Facility: CLINIC | Age: 85
End: 2020-08-17
Payer: MEDICARE

## 2020-08-17 VITALS
WEIGHT: 180 LBS | TEMPERATURE: 98.3 F | DIASTOLIC BLOOD PRESSURE: 50 MMHG | HEIGHT: 63 IN | BODY MASS INDEX: 31.89 KG/M2 | SYSTOLIC BLOOD PRESSURE: 120 MMHG | HEART RATE: 68 BPM

## 2020-08-17 DIAGNOSIS — R11.0 NAUSEA: ICD-10-CM

## 2020-08-17 PROCEDURE — 99213 OFFICE O/P EST LOW 20 MIN: CPT

## 2020-08-17 NOTE — HISTORY OF PRESENT ILLNESS
[Other: _____] : [unfilled] [FreeTextEntry1] : follow up from hospital [de-identified] : She was admitted to [facility] b/o cellulitis. She had a normal WBC and a decrease in the H/H over the 3 days she was in hospital.She was discharged on Keflex 250 mg q 8 h, and probiotic. She is nauseated and not eating well b/o the nausea. ECG showed RSTR first degree AB block, RBBB, inferior wall MI\par Today the cellulitis is much fainter and not tender. Temp normal

## 2020-08-17 NOTE — PHYSICAL EXAM
[No Acute Distress] : no acute distress [No JVD] : no jugular venous distention [No Lymphadenopathy] : no lymphadenopathy [Supple] : supple [Clear to Auscultation] : lungs were clear to auscultation bilaterally [No Respiratory Distress] : no respiratory distress  [No Accessory Muscle Use] : no accessory muscle use [Normal Rate] : normal rate  [Normal Percussion] : the chest was normal to percussion [Regular Rhythm] : with a regular rhythm [Normal S1, S2] : normal S1 and S2 [No Murmur] : no murmur heard [No Edema] : there was no peripheral edema [de-identified] : Mild erythema but contractio of the area based on inked borders.

## 2020-08-17 NOTE — ASSESSMENT
[FreeTextEntry1] : Cellulitis mostly resolved.\par Anemia --workup sjows it is not iron deficient, but with creatinine elevated most likely low GFR is the cause.

## 2020-08-19 DIAGNOSIS — Z79.02 LONG TERM (CURRENT) USE OF ANTITHROMBOTICS/ANTIPLATELETS: ICD-10-CM

## 2020-08-19 DIAGNOSIS — Z82.49 FAMILY HISTORY OF ISCHEMIC HEART DISEASE AND OTHER DISEASES OF THE CIRCULATORY SYSTEM: ICD-10-CM

## 2020-08-19 DIAGNOSIS — I44.0 ATRIOVENTRICULAR BLOCK, FIRST DEGREE: ICD-10-CM

## 2020-08-19 DIAGNOSIS — K21.9 GASTRO-ESOPHAGEAL REFLUX DISEASE WITHOUT ESOPHAGITIS: ICD-10-CM

## 2020-08-19 DIAGNOSIS — N17.9 ACUTE KIDNEY FAILURE, UNSPECIFIED: ICD-10-CM

## 2020-08-19 DIAGNOSIS — E87.6 HYPOKALEMIA: ICD-10-CM

## 2020-08-19 DIAGNOSIS — I87.8 OTHER SPECIFIED DISORDERS OF VEINS: ICD-10-CM

## 2020-08-19 DIAGNOSIS — I12.9 HYPERTENSIVE CHRONIC KIDNEY DISEASE WITH STAGE 1 THROUGH STAGE 4 CHRONIC KIDNEY DISEASE, OR UNSPECIFIED CHRONIC KIDNEY DISEASE: ICD-10-CM

## 2020-08-19 DIAGNOSIS — Z96.641 PRESENCE OF RIGHT ARTIFICIAL HIP JOINT: ICD-10-CM

## 2020-08-19 DIAGNOSIS — Z79.4 LONG TERM (CURRENT) USE OF INSULIN: ICD-10-CM

## 2020-08-19 DIAGNOSIS — Z86.73 PERSONAL HISTORY OF TRANSIENT ISCHEMIC ATTACK (TIA), AND CEREBRAL INFARCTION WITHOUT RESIDUAL DEFICITS: ICD-10-CM

## 2020-08-19 DIAGNOSIS — L03.116 CELLULITIS OF LEFT LOWER LIMB: ICD-10-CM

## 2020-08-19 DIAGNOSIS — E11.22 TYPE 2 DIABETES MELLITUS WITH DIABETIC CHRONIC KIDNEY DISEASE: ICD-10-CM

## 2020-08-19 DIAGNOSIS — B95.5 UNSPECIFIED STREPTOCOCCUS AS THE CAUSE OF DISEASES CLASSIFIED ELSEWHERE: ICD-10-CM

## 2020-08-19 DIAGNOSIS — Z80.0 FAMILY HISTORY OF MALIGNANT NEOPLASM OF DIGESTIVE ORGANS: ICD-10-CM

## 2020-08-19 DIAGNOSIS — E78.5 HYPERLIPIDEMIA, UNSPECIFIED: ICD-10-CM

## 2020-08-19 DIAGNOSIS — Z11.59 ENCOUNTER FOR SCREENING FOR OTHER VIRAL DISEASES: ICD-10-CM

## 2020-08-19 DIAGNOSIS — E03.9 HYPOTHYROIDISM, UNSPECIFIED: ICD-10-CM

## 2020-08-19 DIAGNOSIS — M10.9 GOUT, UNSPECIFIED: ICD-10-CM

## 2020-08-19 DIAGNOSIS — N18.3 CHRONIC KIDNEY DISEASE, STAGE 3 (MODERATE): ICD-10-CM

## 2020-10-21 ENCOUNTER — LABORATORY RESULT (OUTPATIENT)
Age: 85
End: 2020-10-21

## 2020-10-22 LAB
25(OH)D3 SERPL-MCNC: 26.9 NG/ML
BASOPHILS # BLD AUTO: 0.06 K/UL
BASOPHILS NFR BLD AUTO: 0.8 %
CHOLEST SERPL-MCNC: 144 MG/DL
CK SERPL-CCNC: 82 U/L
EOSINOPHIL # BLD AUTO: 0.55 K/UL
EOSINOPHIL NFR BLD AUTO: 6.9 %
ESTIMATED AVERAGE GLUCOSE: 137 MG/DL
FOLATE SERPL-MCNC: >20 NG/ML
FRUCTOSAMINE SERPL-MCNC: 265 UMOL/L
HBA1C MFR BLD HPLC: 6.4 %
HCT VFR BLD CALC: 35.8 %
HDLC SERPL-MCNC: 65 MG/DL
HGB BLD-MCNC: 11.3 G/DL
IMM GRANULOCYTES NFR BLD AUTO: 0.3 %
LDLC SERPL CALC-MCNC: 65 MG/DL
LYMPHOCYTES # BLD AUTO: 1.55 K/UL
LYMPHOCYTES NFR BLD AUTO: 19.4 %
MAN DIFF?: NORMAL
MCHC RBC-ENTMCNC: 28.5 PG
MCHC RBC-ENTMCNC: 31.6 GM/DL
MCV RBC AUTO: 90.4 FL
MONOCYTES # BLD AUTO: 0.55 K/UL
MONOCYTES NFR BLD AUTO: 6.9 %
NEUTROPHILS # BLD AUTO: 5.24 K/UL
NEUTROPHILS NFR BLD AUTO: 65.7 %
NONHDLC SERPL-MCNC: 79 MG/DL
PLATELET # BLD AUTO: 242 K/UL
RBC # BLD: 3.96 M/UL
RBC # FLD: 15.9 %
T4 FREE SERPL-MCNC: 1.3 NG/DL
TRIGL SERPL-MCNC: 71 MG/DL
TSH SERPL-ACNC: 2.41 UIU/ML
URATE SERPL-MCNC: 2.8 MG/DL
VIT B12 SERPL-MCNC: 347 PG/ML
WBC # FLD AUTO: 7.97 K/UL

## 2020-11-16 ENCOUNTER — APPOINTMENT (OUTPATIENT)
Dept: INTERNAL MEDICINE | Facility: CLINIC | Age: 85
End: 2020-11-16
Payer: MEDICARE

## 2020-11-16 VITALS
WEIGHT: 182 LBS | DIASTOLIC BLOOD PRESSURE: 70 MMHG | TEMPERATURE: 97.9 F | HEART RATE: 68 BPM | BODY MASS INDEX: 32.25 KG/M2 | HEIGHT: 63 IN | SYSTOLIC BLOOD PRESSURE: 130 MMHG

## 2020-11-16 PROCEDURE — 99214 OFFICE O/P EST MOD 30 MIN: CPT

## 2020-11-16 RX ORDER — AMITRIPTYLINE HYDROCHLORIDE 25 MG/1
25 TABLET, FILM COATED ORAL DAILY
Qty: 90 | Refills: 3 | Status: DISCONTINUED | COMMUNITY
End: 2020-11-16

## 2020-11-16 RX ORDER — CEPHALEXIN 250 MG/1
250 TABLET ORAL EVERY 8 HOURS
Qty: 9 | Refills: 10 | Status: DISCONTINUED | COMMUNITY
Start: 2020-08-17 | End: 2020-11-16

## 2020-11-16 RX ORDER — METFORMIN ER 500 MG 500 MG/1
500 TABLET ORAL
Qty: 180 | Refills: 3 | Status: DISCONTINUED | COMMUNITY
End: 2020-11-16

## 2020-11-16 RX ORDER — BLOOD SUGAR DIAGNOSTIC
STRIP MISCELLANEOUS
Refills: 0 | Status: DISCONTINUED | COMMUNITY
End: 2020-11-16

## 2020-11-16 RX ORDER — CEPHALEXIN 250 MG/1
250 CAPSULE ORAL
Qty: 9 | Refills: 0 | Status: COMPLETED | COMMUNITY
Start: 2020-08-17

## 2020-11-16 RX ORDER — FAMOTIDINE 20 MG/1
20 TABLET, FILM COATED ORAL DAILY
Qty: 90 | Refills: 3 | Status: DISCONTINUED | COMMUNITY
Start: 2019-10-18 | End: 2020-11-16

## 2020-11-16 RX ORDER — LEVOFLOXACIN 750 MG/1
750 TABLET, FILM COATED ORAL DAILY
Qty: 7 | Refills: 1 | Status: DISCONTINUED | COMMUNITY
Start: 2020-08-06 | End: 2020-11-16

## 2020-11-16 NOTE — HISTORY OF PRESENT ILLNESS
[FreeTextEntry1] : follow up [de-identified] : Uses walker during the night to prevent a fall--she fell 3 years ago and fractured her hip. \par SOB on walking, chest pain none. Dizziness--none. Appetite improving.\par Didn't gain weight she says. No leg pain. \par Edema--no new--her left leg is always more swollen than the right. \par No nausea. \par Takes FS in AM and have been under control--this . Had blood work done at home 1 month ago.

## 2020-11-16 NOTE — PLAN
[FreeTextEntry1] : Elevate legs. Support stockings which she has but never wore. \par No change in medications. \par

## 2020-11-16 NOTE — PHYSICAL EXAM
[No Acute Distress] : no acute distress [Well Nourished] : well nourished [Well Developed] : well developed [Normal Voice/Communication] : normal voice/communication [No JVD] : no jugular venous distention [No Lymphadenopathy] : no lymphadenopathy [Supple] : supple [No Respiratory Distress] : no respiratory distress  [No Accessory Muscle Use] : no accessory muscle use [Clear to Auscultation] : lungs were clear to auscultation bilaterally [Normal Percussion] : the chest was normal to percussion [Normal Rate] : normal rate  [Regular Rhythm] : with a regular rhythm [Normal S1, S2] : normal S1 and S2 [No Murmur] : no murmur heard [___ +] : [unfilled]U+ pitting edema to L ankle [Speech Grossly Normal] : speech grossly normal [Memory Grossly Normal] : memory grossly normal [Normal Affect] : the affect was normal [Normal Mood] : the mood was normal [de-identified] : Mild erythema but contractio of the area based on inked borders.

## 2021-02-15 ENCOUNTER — APPOINTMENT (OUTPATIENT)
Dept: INTERNAL MEDICINE | Facility: CLINIC | Age: 86
End: 2021-02-15
Payer: MEDICARE

## 2021-02-15 VITALS
SYSTOLIC BLOOD PRESSURE: 120 MMHG | HEIGHT: 63 IN | DIASTOLIC BLOOD PRESSURE: 60 MMHG | WEIGHT: 180 LBS | BODY MASS INDEX: 31.89 KG/M2 | TEMPERATURE: 97.9 F | HEART RATE: 72 BPM

## 2021-02-15 DIAGNOSIS — R06.00 DYSPNEA, UNSPECIFIED: ICD-10-CM

## 2021-02-15 DIAGNOSIS — L03.90 CELLULITIS, UNSPECIFIED: ICD-10-CM

## 2021-02-15 DIAGNOSIS — Z23 ENCOUNTER FOR IMMUNIZATION: ICD-10-CM

## 2021-02-15 PROCEDURE — 99214 OFFICE O/P EST MOD 30 MIN: CPT | Mod: 25

## 2021-02-15 PROCEDURE — 36415 COLL VENOUS BLD VENIPUNCTURE: CPT

## 2021-02-15 RX ORDER — ONDANSETRON 4 MG/1
4 TABLET ORAL EVERY 8 HOURS
Qty: 10 | Refills: 0 | Status: DISCONTINUED | COMMUNITY
Start: 2019-10-25 | End: 2021-02-15

## 2021-02-15 RX ORDER — MULTIVIT-MIN/FOLIC/VIT K/LYCOP 400-300MCG
1000 TABLET ORAL DAILY
Qty: 100 | Refills: 1 | Status: ACTIVE | COMMUNITY
Start: 2021-02-15

## 2021-02-15 RX ORDER — CYANOCOBALAMIN (VITAMIN B-12) 1000 MCG
1000 TABLET ORAL DAILY
Qty: 100 | Refills: 0 | Status: ACTIVE | COMMUNITY
Start: 2021-02-15

## 2021-02-15 RX ORDER — BENAZEPRIL HYDROCHLORIDE 10 MG/1
10 TABLET, FILM COATED ORAL
Qty: 30 | Refills: 2 | Status: DISCONTINUED | COMMUNITY
End: 2021-02-15

## 2021-02-15 NOTE — PHYSICAL EXAM
[No Acute Distress] : no acute distress [No JVD] : no jugular venous distention [No Lymphadenopathy] : no lymphadenopathy [Thyroid Normal, No Nodules] : the thyroid was normal and there were no nodules present [No Respiratory Distress] : no respiratory distress  [No Accessory Muscle Use] : no accessory muscle use [___+] : [unfilled]U+ pretibial pitting edema on the left [Normal Appearance] : normal in appearance [No Masses] : no palpable masses [de-identified] : obese

## 2021-02-15 NOTE — HISTORY OF PRESENT ILLNESS
[Family Member] : family member [FreeTextEntry1] : follow up [de-identified] : BP has been good at home takes it BID. Sometimes finds BP on the low side and doesn't take labetalol if diastolic is <60, systolic <120. \par ROCK none but has to deep a deep breath, doesn't go up and down stairs since hip surgery 4 years ago. PND--none. Nocturia x 2-3.\par

## 2021-02-16 PROBLEM — L03.90 CELLULITIS: Status: ACTIVE | Noted: 2020-05-20

## 2021-02-16 PROBLEM — R06.00 DYSPNEA: Status: ACTIVE | Noted: 2019-05-17

## 2021-02-16 LAB
ALBUMIN SERPL ELPH-MCNC: 3.7 G/DL
ALP BLD-CCNC: 98 U/L
ALT SERPL-CCNC: 17 U/L
ANION GAP SERPL CALC-SCNC: 13 MMOL/L
AST SERPL-CCNC: 20 U/L
BASOPHILS # BLD AUTO: 0.06 K/UL
BASOPHILS NFR BLD AUTO: 0.7 %
BILIRUB SERPL-MCNC: 0.7 MG/DL
BUN SERPL-MCNC: 31 MG/DL
CALCIUM SERPL-MCNC: 8.5 MG/DL
CHLORIDE SERPL-SCNC: 105 MMOL/L
CHOLEST SERPL-MCNC: 122 MG/DL
CK SERPL-CCNC: 82 U/L
CO2 SERPL-SCNC: 25 MMOL/L
CREAT SERPL-MCNC: 1.76 MG/DL
EOSINOPHIL # BLD AUTO: 0.84 K/UL
EOSINOPHIL NFR BLD AUTO: 10.2 %
ESTIMATED AVERAGE GLUCOSE: 143 MG/DL
GLUCOSE SERPL-MCNC: 131 MG/DL
HBA1C MFR BLD HPLC: 6.6 %
HCT VFR BLD CALC: 35.6 %
HDLC SERPL-MCNC: 54 MG/DL
HGB BLD-MCNC: 11 G/DL
IMM GRANULOCYTES NFR BLD AUTO: 0.2 %
LDLC SERPL CALC-MCNC: 56 MG/DL
LYMPHOCYTES # BLD AUTO: 1.23 K/UL
LYMPHOCYTES NFR BLD AUTO: 15 %
MAN DIFF?: NORMAL
MCHC RBC-ENTMCNC: 28.4 PG
MCHC RBC-ENTMCNC: 30.9 GM/DL
MCV RBC AUTO: 91.8 FL
MONOCYTES # BLD AUTO: 0.78 K/UL
MONOCYTES NFR BLD AUTO: 9.5 %
NEUTROPHILS # BLD AUTO: 5.29 K/UL
NEUTROPHILS NFR BLD AUTO: 64.4 %
NONHDLC SERPL-MCNC: 68 MG/DL
PLATELET # BLD AUTO: 281 K/UL
POTASSIUM SERPL-SCNC: 4.1 MMOL/L
PROT SERPL-MCNC: 6 G/DL
RBC # BLD: 3.88 M/UL
RBC # FLD: 14.9 %
SODIUM SERPL-SCNC: 143 MMOL/L
TRIGL SERPL-MCNC: 63 MG/DL
VIT B12 SERPL-MCNC: 710 PG/ML
WBC # FLD AUTO: 8.22 K/UL

## 2021-02-16 RX ORDER — INSULIN GLARGINE 100 [IU]/ML
100 INJECTION, SOLUTION SUBCUTANEOUS
Refills: 0 | Status: COMPLETED | COMMUNITY
Start: 2017-07-28 | End: 2018-10-16

## 2021-02-16 RX ORDER — LEVOTHYROXINE SODIUM 50 UG/1
50 TABLET ORAL
Refills: 0 | Status: COMPLETED | COMMUNITY
Start: 2017-07-28 | End: 2018-10-16

## 2021-04-28 NOTE — H&P ADULT. - NEUROLOGICAL DETAILS
Tachypnea normal strength/alert and oriented x 3/sensation intact/deep reflexes intact/cranial nerves intact

## 2021-05-21 ENCOUNTER — NON-APPOINTMENT (OUTPATIENT)
Age: 86
End: 2021-05-21

## 2021-05-21 ENCOUNTER — APPOINTMENT (OUTPATIENT)
Dept: FAMILY MEDICINE | Facility: CLINIC | Age: 86
End: 2021-05-21
Payer: MEDICARE

## 2021-05-21 ENCOUNTER — LABORATORY RESULT (OUTPATIENT)
Age: 86
End: 2021-05-21

## 2021-05-21 VITALS
HEIGHT: 63 IN | SYSTOLIC BLOOD PRESSURE: 148 MMHG | BODY MASS INDEX: 31.36 KG/M2 | TEMPERATURE: 97.7 F | OXYGEN SATURATION: 98 % | WEIGHT: 177 LBS | HEART RATE: 69 BPM | DIASTOLIC BLOOD PRESSURE: 67 MMHG

## 2021-05-21 VITALS — DIASTOLIC BLOOD PRESSURE: 78 MMHG | SYSTOLIC BLOOD PRESSURE: 126 MMHG

## 2021-05-21 DIAGNOSIS — G62.9 POLYNEUROPATHY, UNSPECIFIED: ICD-10-CM

## 2021-05-21 DIAGNOSIS — D64.9 ANEMIA, UNSPECIFIED: ICD-10-CM

## 2021-05-21 PROCEDURE — G0439: CPT

## 2021-05-21 PROCEDURE — G0444 DEPRESSION SCREEN ANNUAL: CPT | Mod: 59

## 2021-05-21 PROCEDURE — 93000 ELECTROCARDIOGRAM COMPLETE: CPT | Mod: 59

## 2021-05-21 PROCEDURE — 36415 COLL VENOUS BLD VENIPUNCTURE: CPT

## 2021-05-21 NOTE — PLAN
[FreeTextEntry1] : Will follow up labwork drawn in office today.\par Discussed PT eval for deconditioning, patient declines at this time, but will consider. \par \par EKG sinus katty; RBBB,  no change from previous. \par \par HCP-Daughter\par \par Will adjust meds based on labs. \par Patient expressed understanding of plan.\par

## 2021-05-21 NOTE — HISTORY OF PRESENT ILLNESS
[Family Member] : family member [FreeTextEntry1] : Ms. WALTERS presents for annual physical.\par  [de-identified] : Ms. WALTERS presents for annual physical.\par Transitioning from Dr. Carias.\par Checking pressure BID; /68. \par Averaging 140s-150. \par Holds if DBP low. \par Takes a MVI\par \par 24 units in the AM of Lantus. \par On Antibiotic-for dental work.\par Dr. Abhishek Burton-Neurologist-on Plavix on TIA; doing baby aspirin. Holding for tooth extraction.\par Last saw Neurology 1.5 years ago. \par \par  this AM. \par Leakage with urine-diapers and leakage.\par Notes she was on amitriptyline at one point which helped her sleep.

## 2021-05-21 NOTE — PHYSICAL EXAM
[Normal Sclera/Conjunctiva] : normal sclera/conjunctiva [Normal Oropharynx] : the oropharynx was normal [No Extremity Clubbing/Cyanosis] : no extremity clubbing/cyanosis [Normal] : affect was normal and insight and judgment were intact [de-identified] : mild cerumen b/l [de-identified] : trace edema; non-pitting

## 2021-05-21 NOTE — REVIEW OF SYSTEMS
[Dyspnea on Exertion] : dyspnea on exertion [Frequency] : frequency [Negative] : Gastrointestinal [Wheezing] : no wheezing [Dysuria] : no dysuria [Headache] : no headache [Dizziness] : no dizziness [de-identified] : waking up to void, sometimes having trouble going back to sleep

## 2021-05-21 NOTE — HEALTH RISK ASSESSMENT
[No falls in past year] : Patient reported no falls in the past year [# of Members in Household ___] :  household currently consist of [unfilled] member(s) [# Of Children ___] : has [unfilled] children [With Patient/Caregiver] : With Patient/Caregiver [Designated Healthcare Proxy] : Designated healthcare proxy [Name: ___] : Health Care Proxy's Name: [unfilled]  [Relationship: ___] : Relationship: [unfilled] [] : No [de-identified] : Neuro, Nephro  [YearQuit] : 60 years ago [de-identified] : Mother daughter-daughter lives upstairs [FreeTextEntry3] : 2 daughters [de-identified] : no hearing aids [de-identified] : reading glasses  [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

## 2021-05-24 ENCOUNTER — APPOINTMENT (OUTPATIENT)
Dept: INTERNAL MEDICINE | Facility: CLINIC | Age: 86
End: 2021-05-24

## 2021-05-24 LAB
25(OH)D3 SERPL-MCNC: 35.3 NG/ML
ALBUMIN SERPL ELPH-MCNC: 4.2 G/DL
ALP BLD-CCNC: 91 U/L
ALT SERPL-CCNC: 52 U/L
ANION GAP SERPL CALC-SCNC: 14 MMOL/L
APPEARANCE: ABNORMAL
AST SERPL-CCNC: 51 U/L
BASOPHILS # BLD AUTO: 0.05 K/UL
BASOPHILS NFR BLD AUTO: 0.6 %
BILIRUB SERPL-MCNC: 0.8 MG/DL
BILIRUBIN URINE: NEGATIVE
BLOOD URINE: NEGATIVE
BUN SERPL-MCNC: 32 MG/DL
CALCIUM SERPL-MCNC: 9.4 MG/DL
CHLORIDE SERPL-SCNC: 104 MMOL/L
CHOLEST SERPL-MCNC: 142 MG/DL
CO2 SERPL-SCNC: 23 MMOL/L
COLOR: YELLOW
CREAT SERPL-MCNC: 1.73 MG/DL
CREAT SPEC-SCNC: 58 MG/DL
EOSINOPHIL # BLD AUTO: 0.64 K/UL
EOSINOPHIL NFR BLD AUTO: 8.2 %
ESTIMATED AVERAGE GLUCOSE: 140 MG/DL
FERRITIN SERPL-MCNC: 65 NG/ML
FOLATE SERPL-MCNC: >20 NG/ML
GLUCOSE QUALITATIVE U: NEGATIVE
GLUCOSE SERPL-MCNC: 80 MG/DL
HBA1C MFR BLD HPLC: 6.5 %
HCT VFR BLD CALC: 36.3 %
HDLC SERPL-MCNC: 49 MG/DL
HGB BLD-MCNC: 11.6 G/DL
IMM GRANULOCYTES NFR BLD AUTO: 0.3 %
IRON SATN MFR SERPL: 28 %
IRON SERPL-MCNC: 86 UG/DL
KETONES URINE: NEGATIVE
LDLC SERPL CALC-MCNC: 74 MG/DL
LEUKOCYTE ESTERASE URINE: NEGATIVE
LYMPHOCYTES # BLD AUTO: 0.83 K/UL
LYMPHOCYTES NFR BLD AUTO: 10.6 %
MAN DIFF?: NORMAL
MCHC RBC-ENTMCNC: 28.3 PG
MCHC RBC-ENTMCNC: 32 GM/DL
MCV RBC AUTO: 88.5 FL
MICROALBUMIN 24H UR DL<=1MG/L-MCNC: 1.8 MG/DL
MICROALBUMIN/CREAT 24H UR-RTO: 32 MG/G
MONOCYTES # BLD AUTO: 0.6 K/UL
MONOCYTES NFR BLD AUTO: 7.7 %
NEUTROPHILS # BLD AUTO: 5.7 K/UL
NEUTROPHILS NFR BLD AUTO: 72.6 %
NITRITE URINE: NEGATIVE
NONHDLC SERPL-MCNC: 92 MG/DL
PH URINE: 5
PLATELET # BLD AUTO: 283 K/UL
POTASSIUM SERPL-SCNC: 4.7 MMOL/L
PROT SERPL-MCNC: 6.4 G/DL
PROTEIN URINE: NEGATIVE
RBC # BLD: 4.1 M/UL
RBC # FLD: 15.6 %
SODIUM SERPL-SCNC: 141 MMOL/L
SPECIFIC GRAVITY URINE: 1.01
TIBC SERPL-MCNC: 306 UG/DL
TRANSFERRIN SERPL-MCNC: 267 MG/DL
TRIGL SERPL-MCNC: 89 MG/DL
TSH SERPL-ACNC: 4.17 UIU/ML
UIBC SERPL-MCNC: 221 UG/DL
URATE SERPL-MCNC: 3.2 MG/DL
UROBILINOGEN URINE: NORMAL
VIT B12 SERPL-MCNC: 876 PG/ML
WBC # FLD AUTO: 7.84 K/UL

## 2021-05-26 ENCOUNTER — NON-APPOINTMENT (OUTPATIENT)
Age: 86
End: 2021-05-26

## 2021-06-18 ENCOUNTER — APPOINTMENT (OUTPATIENT)
Dept: FAMILY MEDICINE | Facility: CLINIC | Age: 86
End: 2021-06-18
Payer: MEDICARE

## 2021-06-18 VITALS
HEIGHT: 63 IN | TEMPERATURE: 97.8 F | OXYGEN SATURATION: 97 % | HEART RATE: 58 BPM | WEIGHT: 175 LBS | BODY MASS INDEX: 31.01 KG/M2 | DIASTOLIC BLOOD PRESSURE: 60 MMHG | SYSTOLIC BLOOD PRESSURE: 128 MMHG

## 2021-06-18 DIAGNOSIS — R82.90 UNSPECIFIED ABNORMAL FINDINGS IN URINE: ICD-10-CM

## 2021-06-18 DIAGNOSIS — R53.81 OTHER MALAISE: ICD-10-CM

## 2021-06-18 PROCEDURE — 99214 OFFICE O/P EST MOD 30 MIN: CPT | Mod: 25

## 2021-06-18 PROCEDURE — 36415 COLL VENOUS BLD VENIPUNCTURE: CPT

## 2021-06-18 NOTE — HISTORY OF PRESENT ILLNESS
[Family Member] : family member [FreeTextEntry1] : Here for follow-up; repeat LFT and UA. [de-identified] : Here for follow-up; repeat LFT and UA.\par Notes some decreased appetite. \par Was taking tylenol and abx last time lab work was done. \par Drinks Ensure-for lunch. \par \par Does report feeling weak when ambulating outside.\par Otherwise feeling okay.\par Medications and allergies reviewed.\par \par

## 2021-06-18 NOTE — PLAN
[FreeTextEntry1] : Will follow up labwork drawn in office today.  Stable exam. \par \par Recommending PT eval for strength and conditioning.  Patient hesitant; states she will consider. \par \par Will adjust meds based on labs. \par Patient expressed understanding of plan.\par

## 2021-06-18 NOTE — REVIEW OF SYSTEMS
[Negative] : Genitourinary [Headache] : no headache [Dizziness] : no dizziness [FreeTextEntry7] : some decreased appetite

## 2021-06-21 LAB
ALBUMIN SERPL ELPH-MCNC: 4.3 G/DL
ALP BLD-CCNC: 86 U/L
ALT SERPL-CCNC: 26 U/L
ANION GAP SERPL CALC-SCNC: 13 MMOL/L
APPEARANCE: CLEAR
AST SERPL-CCNC: 21 U/L
BACTERIA: NEGATIVE
BILIRUB SERPL-MCNC: 0.7 MG/DL
BILIRUBIN URINE: NEGATIVE
BLOOD URINE: NEGATIVE
BUN SERPL-MCNC: 32 MG/DL
CALCIUM SERPL-MCNC: 9.6 MG/DL
CHLORIDE SERPL-SCNC: 103 MMOL/L
CO2 SERPL-SCNC: 25 MMOL/L
COLOR: YELLOW
CREAT SERPL-MCNC: 1.81 MG/DL
GLUCOSE QUALITATIVE U: NEGATIVE
GLUCOSE SERPL-MCNC: 115 MG/DL
HYALINE CASTS: 0 /LPF
KETONES URINE: NEGATIVE
LEUKOCYTE ESTERASE URINE: NEGATIVE
MICROSCOPIC-UA: NORMAL
NITRITE URINE: NEGATIVE
PH URINE: 5.5
POTASSIUM SERPL-SCNC: 4.3 MMOL/L
PROT SERPL-MCNC: 6.8 G/DL
PROTEIN URINE: NEGATIVE
RED BLOOD CELLS URINE: 1 /HPF
SODIUM SERPL-SCNC: 141 MMOL/L
SPECIFIC GRAVITY URINE: 1.02
SQUAMOUS EPITHELIAL CELLS: 1 /HPF
URINE COMMENTS: NORMAL
UROBILINOGEN URINE: NORMAL
WHITE BLOOD CELLS URINE: 1 /HPF

## 2021-08-12 NOTE — OCCUPATIONAL THERAPY INITIAL EVALUATION ADULT - PHYSICAL ASSIST/NONPHYSICAL ASSIST: GROOMING, OT EVAL
Post Acute Skilled Nursing Home Subsequent Note     Date of Service: 8/11/2021  Location seen at: [facility]  Subacute / Skilled Need: Rehabilitation    PCP: Arianna Miller MD   Patient Care Team:  Arianna Miller MD as PCP - General (Family Practice)  Lili Reed MD as Post Acute Facility Provider: Physician (Family Practice)  Marleny Reese CNP as Post Acute Facility Provider: APC (Nurse Practitioner)  Attending SNF MD: Brianna  Seen by Lili Reed MD today    Maylin Lucero is a 63 year old female presenting to Post Acute Skilled Nursing for: PT/OT.   History of Present Illness: Inpatient [facility] 7/9/2021 through 7/21/2021.  Discharge Diagnoses:  Acute on chronic hypoxic severity failure, baseline on 6 to 8 L nasal cannula at home likely secondary to acute on chronic decompensated right heart failure  Comminuted impacted and partially displaced fracture of the right proximal tibial and fibula status post ORIF on 7/12  Severe pulmonary hypertension  Pulmonary sarcoidosis  ADINA on CKD stage III improved  Scalp laceration POA status post sutures on 7/8 acute pulmonary  Rheumatoid arthritis  Anemia of chronic disease  Leukocytosis POA likely reactive  Type 2 diabetes  Hypothyroidism  Morbid obesity  Mild hyponatremia  Urinary retention improved    63-year-old female with history of sarcoidosis, severe pulmonary hypertension, diabetes, rheumatoid arthritis, CKD, hypothyroidism, anemia, chronic respiratory failure on home O2 usually at least 5 L who sustained a fall at home.  She presented to the ER after the fall with pain in her right lower extremity and was diagnosed with an impacted displaced fracture of the lateral tibial plateau.  Due to her complicated medical history she was evaluated by pulmonary and cardiology prior to surgical intervention.  On 7/12/2021 she underwent a right heart cath.  Subsequently on 7/12/2021 she underwent an ORIF of the right tibial  peg.  Complications during her hospital stay included acute on chronic decompensated right heart failure.  Echo revealed ejection fraction  55%.  She was managed by the pulmonary team and is down to O2 nasal cannula of 6 to 8 L.  She also had ADINA on CKD which was followed by nephrology and her creatinine upon discharge was 1.5  She went to acute urinary retention which required Nino catheter insertion.  This was followed by urology and ultimately the catheter was discontinued.  She also experienced narcotic induced hallucinations.  She remains very fearful of the narcotic medications but was discharged on Tylenol 3 and tramadol which she does require for pain management.  She is now transitioned to the skilled nursing facility for physical therapy.  Patient does live alone and is aware that she will be nonweightbearing for extended period of time.  Her goal is to improve transfers so she can return home with supportive services and family members to assist.  Today she is very anxious and often teary-eyed over her whole experience.  She did not feel the nursing facility was prepared for her arrival and therefore all of her needs were not in place.  She is easily agitated and very frustrated and is noted cries frequently throughout the evaluation.  Discussion was entertained regarding this and the possibility of using anxiolytics which she declines as she has had negative experiences with them in the past.  Her pain is fair she is waiting pain medication for improved control.  Her primary complaint is nursing staff not responding as quickly when she calls for them and fear of not having someone available to meet her needs especially with the upcoming weekend.  She is very afraid of being in this facility on the weekend and having no one to attend to her.    7/23  Above copied,  PT seen in her room, in bed, appears less anxious today than previous note, pt worried about sututres to right forehead, d/w nurse  harpreet,  Also d/w pt possible relatives, or caregivers that can assist her when she discharges from SNF, as pt will be NWB for several more weeks,  Pt states she doesn't like the food, and had a BM today,  Pt lives alone, in a house, with 5 CARLOS, she is on home O2    7/26  Pt seen in her room, in bed, alert, pt voiced many concerns re: therapy, lack of assist from nursing staff,  Pt was incontinent of urine, and bedding is soiled,  Per therapy director, they are working on transfers with sliding board,  D/w SS, who will talk with pt re: caregivers, and possible help from family members upon discharge  from home,    7/27/21:  Overall patient is showing good improvement.  She denies any pain issues and is actually been working on transferring to bedside commode.  Physical therapy reports patient's ADLs contact-guard to max and transfers are standby.  She remains nonweightbearing to the right lower extremity.  She seems uplifted by the fact that she is gaining some independence  It is aware that discharge will be set for sometime next week with the goal for her to be able to do transfers.  She is somewhat frustrated by knowing that she is 8 weeks of nonweightbearing but understands the reasoning.   She does complain of some mild increase in swelling in her right lower extremity.  No redness or severe warmth reported.  Pain remains at the same level which is mild to moderate.    7/30  Pt seen in her room, up in w/c, still with mild-mod right LE pain, taking pain meds,  Pt asking about f/u appt with ortho, d/w nurse liason, who will let pt know,  PT still has staples to right LE,  Working on transfers in therapy, asking about caregiver lists, d/w nurse harpreet,  Pt is walking 10 feet with a RW, with mod assist, min for BM,  LBD and bathing mod assist,  Pt is able to transfer to a lower level of care with assistance,    8/3  Pt seen in therapy, remains to have some periods of anxiety, tearful at times,  D/w pt's brother,  Po, who is a OB/Gyne MD, at the request of pt ,  D/t pt's NWB status of her right LE, pt is limited as to what she can do in skilled therapy,  IT has been recommended by therapy and medical team, that pt have assistance for safety, pt has been given options of caregivers, or AL,  Family to discuss options with pt today again    8/4/21:  Patient with multiple complaints.  She is concerned about going home does not feel that she is prepared.  She is aware family are making arrangements for assistance in the home setting versus respite care but she still has concerns.  She worries that the leg is more swollen and she still has pain issues.  Spoke at length with patient's physical therapist.  Patient is good with transfers at this period of time.  He states that he is competent she can manage them without difficulty.  He does admit to having issues with some of personal hygiene particularly with toileting however.  He feels that she is at a level where she will remain until her weightbearing status changes.  He sees no further gains to be made at this time until weightbearing status changes and that she no longer require inpatient skilled therapy.  Family is aware of this assessment and aware that they need to find caregiver services to provide her with safety and supervision respite care.  I also spoke at length with patient's brother regarding her current status and plan of care.  Family was under the impression patient would remain in facility much longer period of time.  He is aware that her weightbearing status limits the amount of progress she can make met once that changes she will be able to continue in the skilled level of rehab.  Family still insist patient is not able to transfer and is not doing as well as physical therapy reports.  Advised patient's brother and therapist talked together regarding their assessment to try to better understand where the patient is truly at.  Patient's brother did state  they are in the process of looking for an alternative facility and/or caregiver services.    8/9  Pt seen today in bed, appears to be in better spirits,  Therapist report is that pt is able to hop 20 feet CGA with a RW, SBA for toilet hygeine with BSC,  Per SS, pt has been accepted at [facility] for a respite stay,  PT's appetite is good, BM ok,  Pt won appeal, and a new discharge date has been set for 8/12,    8/10  Pt seen in her room up in w/c, incision to right LE noted to be healing  Discussed pts meds for discharge, if pt leaves 8/12,  PT states her family appealed discharge again  Pt's f/u with ortho is 9/3    8/11/21:  Follow-up patient was scheduled for discharge 8/12/2021.  Many lengthy discussions have been had with patient and family members regarding her current status and the need for alternative care until her weightbearing status changes.  Currently in physical therapy she is able to hop 20 feet contact-guard assist, transfers are contact-guard assist, ADLs are standby.  Patient is still very anxious and nervous about the situation.  Much reassurance has been given.  Family is currently set up respite care and alternative facility until per Ortho visit in which her weightbearing status may change in September.  Patient understands but remains teary-eyed.  She is very anxious and nervous particularly because of her respiratory status.  The power went out for a brief time in the facility due to the weather and patient feels like her oxygen saturations drop quickly within 5 minutes she was getting nervous.  Everything is back to normal at this time and she had no negative outcome.  Spent extra time with patient trying to reassure and help her understand that she has much support the facility she will be going to patient continues to be teary although expresses understanding.    HISTORY  Past Medical History:   Diagnosis Date   • Anemia    • Anxiety    • Arthritis    • Chronic kidney disease    • Chronic  pain    • Depression    • Diabetes mellitus (CMS/HCC)    • Essential (primary) hypertension    • Fracture    • Gastroesophageal reflux disease    • High cholesterol    • Pneumonia    • Sinusitis, chronic    • Sleep apnea    • Thyroid condition       reports that she has quit smoking. She quit after 33.00 years of use. She has never used smokeless tobacco. She reports that she does not drink alcohol and does not use drugs.  Past Surgical History:   Procedure Laterality Date   • Eye surgery      cataract     Family History   Problem Relation Age of Onset   • Cancer Mother    • Diabetes Mother    • Cancer Father    • Asthma Brother    • COPD Brother    • Hearing Loss Brother      History     Not marked as reviewed during this visit.          PROBLEM LIST:  Specialty Problems     None           ADVANCE DIRECTIVES:    Power of  Status:  Status Unknown  Code Status:  FULL CODE  Goals of Care: Return home as soon as possible  Advanced Directive Forms: not on file      DEPRESSION SCREENING:  PHQ 2/9 Test Results  0: Not at all  1: Several days  2: More than half the days  3: Nearly every day      DEPRESSION ASSESSMENT/PLAN:  Situational depression.  Anticipate spontaneous resolution.  Will reassess in During her stay in skilled facility and have psych evaluate months.    ALLERGIES:  Allergies as of 08/11/2021 - Reviewed 07/15/2021   Allergen Reaction Noted   • Hydrocodone-acetaminophen Hallucinations 07/09/2021       CURRENT HOME MEDICATIONS:   Current Outpatient Medications   Medication Sig Dispense Refill   • torsemide (DEMADEX) 20 MG tablet Take 40 mg by mouth 2 times daily.     • acetaminophen-codeine (TYLENOL NO.3) 300-30 MG per tablet Take 1 tablet by mouth every 6 hours as needed for Pain. 20 tablet 0   • docusate sodium-sennosides (SENOKOT S) 50-8.6 MG per tablet Take 1 tablet by mouth 2 times daily. 60 tablet 0   • metoPROLOL tartrate (LOPRESSOR) 25 MG tablet Take 0.5 tablets by mouth every 12 hours. 30  tablet 0   • polyethylene glycol (MIRALAX) 17 g packet Take 17 g by mouth daily as needed (constipation). Stir and dissolve powder in any 4 to 8 ounces of beverage, then drink. 1 each 0   • cyclobenzaprine (FLEXERIL) 5 MG tablet Take 1 tablet by mouth every 8 hours. 90 tablet 0   • furosemide (LASIX) 40 MG tablet Take 1 tablet by mouth 2 times daily. 60 tablet 2   • treprostinil (Tyvaso) 0.6 MG/ML Solution Inhale 12 puffs into the lungs four times daily. Usually takes it at 0800,1200,1600 and 2000     • ambrisentan (LETAIRIS) 10 MG tablet Take 10 mg by mouth daily.     • fluoxetine (PROzac) 40 MG capsule Take 40 mg by mouth daily.     • folic acid (FOLATE) 1 MG tablet Take 1 mg by mouth daily.     • ipratropium (ATROVENT) 0.03 % nasal spray Spray 2 sprays in each nostril 3 times daily.     • leflunomide (ARAVA) 10 MG tablet Take 10 mg by mouth daily.     • methotrexate (RHEUMATREX) 2.5 MG tablet Take 12.5 mg by mouth 1 day a week. ON Wednesdays     • pantoprazole (PROTONIX) 40 MG tablet Take 40 mg by mouth daily.     • tadalafil (CIALIS) 20 MG tablet Take 40 mg by mouth daily.     • levothyroxine 75 MCG tablet Take 75 mcg by mouth daily (before breakfast).     • cetirizine (ZyrTEC) 10 MG tablet Take 10 mg by mouth daily as needed for Allergies. Indications: Hayfever       No current facility-administered medications for this visit.     CURRENT SNF MEDICATIONS:      Discharge Medications:     Summary of your Discharge Medications           Take these Medications      Details   acetaminophen-codeine 300-30 MG per tablet  Commonly known as: TYLENOL NO.3    Take 1 tablet by mouth every 6 hours as needed for Pain.      ambrisentan 10 MG tablet  Commonly known as: LETAIRIS    Take 10 mg by mouth daily.      cetirizine 10 MG tablet  Commonly known as: ZyrTEC    Take 10 mg by mouth daily as needed for Allergies. Indications: Hayfever      cyclobenzaprine 5 MG tablet  Commonly known as: FLEXERIL    Take 1 tablet by mouth  every 8 hours.      docusate sodium-sennosides 50-8.6 MG per tablet  Commonly known as: SENOKOT S    Take 1 tablet by mouth 2 times daily.      fluoxetine 40 MG capsule  Commonly known as: PROzac    Take 40 mg by mouth daily.      folic acid 1 MG tablet  Commonly known as: FOLATE    Take 1 mg by mouth daily.      heparin (porcine) 5000 UNIT/ML injectable solution    Inject 1 mL into the skin every 8 hours for 14 days.      ipratropium 0.03 % nasal spray  Commonly known as: ATROVENT    Spray 2 sprays in each nostril 3 times daily.      leflunomide 10 MG tablet  Commonly known as: ARAVA    Take 10 mg by mouth daily.      levothyroxine 75 MCG tablet    Take 75 mcg by mouth daily (before breakfast).      methotrexate 2.5 MG tablet  Commonly known as: RHEUMATREX    Take 12.5 mg by mouth 1 day a week. ON Wednesdays      metoPROLOL tartrate 25 MG tablet  Commonly known as: LOPRESSOR    Take 0.5 tablets by mouth every 12 hours.      pantoprazole 40 MG tablet  Commonly known as: PROTONIX    Take 40 mg by mouth daily.      polyethylene glycol 17 g packet  Commonly known as: MIRALAX    Take 17 g by mouth daily as needed (constipation). Stir and dissolve powder in any 4 to 8 ounces of beverage, then drink.      tadalafil 20 MG tablet  Commonly known as: CIALIS    Take 40 mg by mouth daily.      torsemide 10 MG tablet  Commonly known as: DEMADEX    Take 20 mg by mouth 2 times daily. Patient taking a total of 40 mg daily      traMADol 50 MG tablet  Commonly known as: ULTRAM    Take 1 tablet by mouth every 8 hours as needed for Pain.      Tyvaso 0.6 MG/ML Solution    Generic drug: treprostinil  Inhale 12 puffs into the lungs four times daily. Usually takes it at 0800,1200,1600 and 2000           BASELINE FUNCTIONAL STATUS:  Independent    CURRENT FUNCTIONAL STATUS:  Weight bearing as tolerated (WBAT)    DIET:  Consistency: General   Type: DM diet  Appetite: Normal    REVIEW OF SYSTEMS:  Review of Systems   Constitutional: Positive  for activity change.   HENT: Negative.    Eyes: Negative.    Respiratory: neg    Cardiovascular: Negative.    Gastrointestinal: Negative.    Endocrine: Negative.    Genitourinary: Negative.    Musculoskeletal:        Leg pain and swelling  Allergic/Immunologic: Negative.    Neurological: Negative.    Psychiatric/Behavioral: Positive for dysphoric mood.       PHYSICAL ASSESSMENT:  Physical Exam  Vitals and nursing note reviewed. Exam conducted without a chaperone present    Constitutional:  Sitting up in bed     Appearance: She is obese.      Comments:up in w/c, cooperative  HENT:      Head: Normocephalic.      Nose: Nose normal.      Mouth/Throat:      Mouth: Mucous membranes are moist.   Eyes:      Extraocular Movements: Extraocular movements intact.   Cardiovascular:      Rate and Rhythm: Normal rate and regular rhythm.      Heart sounds: Normal heart sounds.   Pulmonary:      Effort: Pulmonary effort is normal. No respiratory distress.      Breath sounds: Normal breath sounds. No stridor.      Comments: O2 nasal cannula at 6 L  Abdominal:      Palpations: Abdomen is soft.      Tenderness: There is no abdominal tenderness.   Musculoskeletal:      Cervical back: Normal range of motion and neck supple.      Comments: Right lower extremity with decreased range of motion and mild palpation , no redness or warmth. Brace in place. Mild swelling, brace in place left lower extremity  Skin:     Comments: Incisions right lower extremity with dry dressings in place   Neurological:      Mental Status: She is alert and oriented to person, place, and time.   Psychiatric:      Comments: Depressed mood , anxious, crying intermittently      VITALS:    130/64, 98.4, 76, 18, 91% on 6 L  Pain Level 2/10    LABS:  8/9  CBC  10.3/33, WBC 6.87, plt 133  Bmp noted  BUN/cr 18/1.58, K 3.7    8/2  BMP BUN/cr 22/1.55, Na 142 K 3.8, glu 84  CBC  11.1/37, WBC 6.72, plt 171    Urine culture <10,000 colonies    7/26  CBC  10.7/36, WBC 8.93, plt  287  CMP noted cr 1.69, BUN 29, enc to push fluids    7/21/2021 (Hospital labs):  WBC 9.7  Hgb 10.5  BUN 17  CR 1.51      ASSESSMENT AND PLAN  1. Closed fracture of right tibial plateau with routine healing  Continue: Nonweightbearing  Continue present pain management which appears adequate  Follow-up orthopedics 9/3  Proceed with scheduled discharge 8/12/2021 and will continue with rehab at lower level of care      2. Debility  PT/OT to continue while patient in respite care  Patient safe for transition, none able to do her own ADLs  D/w family and pt the need for assistance, given NWB to right for another month  Family now agreed to a resp[facility] stay at [facility], where pt has been accepted    3. Chronic respiratory failure with hypoxia (CMS/HCC)   Pulmonary HTN (CMS/HCC)  Continue O2 6 L  Continue multiple medications  Follow-up pulmonary    4. Depressed affect  With anxiety  Mood waxes and wanes  Continue with reassurance and counseling  Continue [facility]        Home Health Face-To-Face    I had a face-to-face encounter that meets the provider face-to-face encounter requirement with this patient on 8/12/2021.    The encounter with the patient was in whole, or part, for the following medical condition, which is the primary reason for home health care (list medical conditions):  fx tibia, righ tNWB  Pulmonary HTN      I certify that, based on my findings, the following services are medically necessary home health services:   Nursing  Occupational Therapy  Physical Therapy    My clinical findings support the need for the above service because of the need to monitor safety and medication at home and improve functional status.    Further, this patient is homebound because:  · Requires the assistance of 1-2 people to ambulate due to fx tibia, NWB  · Post-op weakness because fx tibia    This patient is confined to her home (and meets homebound criteria) and needs intermittent skilled nursing care, physical therapy and/or  speech therapy or continues to need occupational therapy.  The patient is under my care, and a plan of care has been initiated and will periodically be reviewed by a physician.  I face-to-face encounter with this patient on the above date, during which the primary reason for home health services was addressed.  I have a clinical note (supporting documentation) documenting my encounter with the patient in the patient's medical record to support certification and eligibility for home care, and will make it available to [facility] upon request.      FOLLOW UP APPOINTMENTS:  Follow-up with primary care physician in 1-2 weeks, [facility] outpatient pulmonary hypertension clinic as needed, pulmonary in 2 to 4 weeks, nephrologist  in 1 to 2 weeks, orthopedic in 9/3    DISCHARGE PLANNING:   Pt is able to transition to a lower level of care    Discussed with:nurse, therapy, , SS, and pt    Prognosis: fair    Total time spent is more than 35 minutes, with more than 50% of the time spent in coordination of care, counseling, review of records and discussion of plan of care with the patient /staff /family     set-up required

## 2021-11-19 ENCOUNTER — APPOINTMENT (OUTPATIENT)
Dept: FAMILY MEDICINE | Facility: CLINIC | Age: 86
End: 2021-11-19
Payer: MEDICARE

## 2021-11-19 ENCOUNTER — LABORATORY RESULT (OUTPATIENT)
Age: 86
End: 2021-11-19

## 2021-11-19 VITALS
DIASTOLIC BLOOD PRESSURE: 58 MMHG | BODY MASS INDEX: 32.6 KG/M2 | SYSTOLIC BLOOD PRESSURE: 128 MMHG | HEART RATE: 58 BPM | HEIGHT: 63 IN | WEIGHT: 184 LBS | OXYGEN SATURATION: 98 %

## 2021-11-19 DIAGNOSIS — R79.89 OTHER SPECIFIED ABNORMAL FINDINGS OF BLOOD CHEMISTRY: ICD-10-CM

## 2021-11-19 PROCEDURE — 36415 COLL VENOUS BLD VENIPUNCTURE: CPT

## 2021-11-19 PROCEDURE — 99214 OFFICE O/P EST MOD 30 MIN: CPT | Mod: 25

## 2021-11-19 NOTE — PHYSICAL EXAM
[Normal Oropharynx] : the oropharynx was normal [No Extremity Clubbing/Cyanosis] : no extremity clubbing/cyanosis [Normal] : affect was normal and insight and judgment were intact

## 2021-11-19 NOTE — PLAN
[FreeTextEntry1] : Will follow up labwork drawn in office today.\par \par BP-monitor BP at home; may need to reduce dosages if running low.\par \par Will adjust meds based on labs. \par Patient expressed understanding of plan.\par

## 2021-11-19 NOTE — HISTORY OF PRESENT ILLNESS
[Family Member] : family member [FreeTextEntry1] : Here for follow-up; needs med refills.\par  [de-identified] : Here for follow-up; needs med refills.\par Has been holding labetalol if BP low at home. \par \par \par Got COVID booster and flu shot. \par 140s/70s.  Checking BP at home.\par Has been taking Lantus 20U; was taking 24U.\par Medications and allergies reviewed.\par \par

## 2021-11-22 LAB
ALBUMIN SERPL ELPH-MCNC: 4.4 G/DL
ALP BLD-CCNC: 83 U/L
ALT SERPL-CCNC: 22 U/L
ANION GAP SERPL CALC-SCNC: 15 MMOL/L
AST SERPL-CCNC: 22 U/L
BASOPHILS # BLD AUTO: 0.05 K/UL
BASOPHILS NFR BLD AUTO: 0.6 %
BILIRUB SERPL-MCNC: 0.7 MG/DL
BUN SERPL-MCNC: 38 MG/DL
CALCIUM SERPL-MCNC: 9.4 MG/DL
CHLORIDE SERPL-SCNC: 105 MMOL/L
CHOLEST SERPL-MCNC: 148 MG/DL
CO2 SERPL-SCNC: 24 MMOL/L
CREAT SERPL-MCNC: 1.83 MG/DL
EOSINOPHIL # BLD AUTO: 0.62 K/UL
EOSINOPHIL NFR BLD AUTO: 8 %
ESTIMATED AVERAGE GLUCOSE: 137 MG/DL
GLUCOSE SERPL-MCNC: 99 MG/DL
HBA1C MFR BLD HPLC: 6.4 %
HCT VFR BLD CALC: 38.5 %
HDLC SERPL-MCNC: 60 MG/DL
HGB BLD-MCNC: 12.3 G/DL
IMM GRANULOCYTES NFR BLD AUTO: 0.3 %
LDLC SERPL CALC-MCNC: 72 MG/DL
LYMPHOCYTES # BLD AUTO: 1.17 K/UL
LYMPHOCYTES NFR BLD AUTO: 15.1 %
MAN DIFF?: NORMAL
MCHC RBC-ENTMCNC: 29.6 PG
MCHC RBC-ENTMCNC: 31.9 GM/DL
MCV RBC AUTO: 92.8 FL
MONOCYTES # BLD AUTO: 0.57 K/UL
MONOCYTES NFR BLD AUTO: 7.4 %
NEUTROPHILS # BLD AUTO: 5.32 K/UL
NEUTROPHILS NFR BLD AUTO: 68.6 %
NONHDLC SERPL-MCNC: 88 MG/DL
PLATELET # BLD AUTO: 266 K/UL
POTASSIUM SERPL-SCNC: 4.5 MMOL/L
PROT SERPL-MCNC: 6.7 G/DL
RBC # BLD: 4.15 M/UL
RBC # FLD: 15.2 %
SODIUM SERPL-SCNC: 143 MMOL/L
TRIGL SERPL-MCNC: 82 MG/DL
TSH SERPL-ACNC: 4.39 UIU/ML
URATE SERPL-MCNC: 2.9 MG/DL
WBC # FLD AUTO: 7.75 K/UL

## 2022-06-10 ENCOUNTER — APPOINTMENT (OUTPATIENT)
Dept: FAMILY MEDICINE | Facility: CLINIC | Age: 87
End: 2022-06-10
Payer: MEDICARE

## 2022-06-10 VITALS
SYSTOLIC BLOOD PRESSURE: 124 MMHG | WEIGHT: 175 LBS | TEMPERATURE: 98.2 F | HEIGHT: 63 IN | HEART RATE: 53 BPM | DIASTOLIC BLOOD PRESSURE: 72 MMHG | OXYGEN SATURATION: 96 % | BODY MASS INDEX: 31.01 KG/M2

## 2022-06-10 DIAGNOSIS — M25.511 PAIN IN RIGHT SHOULDER: ICD-10-CM

## 2022-06-10 PROCEDURE — 36415 COLL VENOUS BLD VENIPUNCTURE: CPT

## 2022-06-10 PROCEDURE — G0439: CPT

## 2022-06-10 PROCEDURE — 93000 ELECTROCARDIOGRAM COMPLETE: CPT | Mod: 59

## 2022-06-10 NOTE — PLAN
[FreeTextEntry1] : Will follow up labwork drawn in office today.\par EKG similar to prior-no change. \par \par No falls.\par Right shoulder pain-\par Advised to try trial of heat/warm compresses for muscle tenderness as needed.  Advised to cover compress, not place directly on skin and not to apply for more than 15 minutes at a time.  Patient expressed understanding.\par Discussed home PT eval. Patient will consider. \par \par Will adjust meds based on labs. \par Patient expressed understanding of plan.\par

## 2022-06-10 NOTE — HISTORY OF PRESENT ILLNESS
[Family Member] : family member [FreeTextEntry1] : Ms. WALTERS presents for annual physical.\par  [de-identified] : Ms. WALTERS presents for annual physical.\par Here with daughter Luz. \par -Checking BP at home. Sometimes holding the labetalol. Is always taking her amlodipine. \par \par 18 units on Lantus at nighttime.  \par Glucose fingerstick 97 in the AM.  \par \par Saw eye doctor. Saw Podiatrist last month.  \par Feeling okay today.   Notes that sometimes her right shoulder bothers her at night with certain movements.  She will take OTC meds for relief. \par

## 2022-06-10 NOTE — PHYSICAL EXAM
[Normal Sclera/Conjunctiva] : normal sclera/conjunctiva [Normal Oropharynx] : the oropharynx was normal [Normal TMs] : both tympanic membranes were normal [No Lymphadenopathy] : no lymphadenopathy [No Extremity Clubbing/Cyanosis] : no extremity clubbing/cyanosis [Normal] : affect was normal and insight and judgment were intact [de-identified] : walker to ambulate [de-identified] : right upper thoracic paraspinal tenderness to palpation; discomfort with right shoulder abduction

## 2022-06-10 NOTE — REVIEW OF SYSTEMS
[Negative] : Genitourinary [Headache] : no headache [Dizziness] : no dizziness [FreeTextEntry7] : some decreased appetite; ensure for lunch, regular dinner  [FreeTextEntry9] : right arm discomfort

## 2022-06-10 NOTE — HEALTH RISK ASSESSMENT
[No falls in past year] : Patient reported no falls in the past year [Alone] : lives alone [# of Members in Household ___] :  household currently consist of [unfilled] member(s) [de-identified] : Mother daughter  [de-identified] : Saw optho

## 2022-06-13 LAB
25(OH)D3 SERPL-MCNC: 49.9 NG/ML
ALBUMIN SERPL ELPH-MCNC: 4.4 G/DL
ALP BLD-CCNC: 82 U/L
ALT SERPL-CCNC: 16 U/L
ANION GAP SERPL CALC-SCNC: 15 MMOL/L
AST SERPL-CCNC: 18 U/L
BASOPHILS # BLD AUTO: 0.07 K/UL
BASOPHILS NFR BLD AUTO: 0.9 %
BILIRUB SERPL-MCNC: 0.7 MG/DL
BUN SERPL-MCNC: 34 MG/DL
CALCIUM SERPL-MCNC: 9.7 MG/DL
CHLORIDE SERPL-SCNC: 105 MMOL/L
CHOLEST SERPL-MCNC: 153 MG/DL
CO2 SERPL-SCNC: 24 MMOL/L
CREAT SERPL-MCNC: 1.81 MG/DL
CREAT SPEC-SCNC: 111 MG/DL
EGFR: 25 ML/MIN/1.73M2
EOSINOPHIL # BLD AUTO: 0.7 K/UL
EOSINOPHIL NFR BLD AUTO: 9.3 %
ESTIMATED AVERAGE GLUCOSE: 137 MG/DL
GLUCOSE SERPL-MCNC: 103 MG/DL
HBA1C MFR BLD HPLC: 6.4 %
HCT VFR BLD CALC: 40.1 %
HDLC SERPL-MCNC: 60 MG/DL
HGB BLD-MCNC: 12.8 G/DL
IMM GRANULOCYTES NFR BLD AUTO: 0.3 %
LDLC SERPL CALC-MCNC: 77 MG/DL
LYMPHOCYTES # BLD AUTO: 1.02 K/UL
LYMPHOCYTES NFR BLD AUTO: 13.5 %
MAN DIFF?: NORMAL
MCHC RBC-ENTMCNC: 28.8 PG
MCHC RBC-ENTMCNC: 31.9 GM/DL
MCV RBC AUTO: 90.3 FL
MICROALBUMIN 24H UR DL<=1MG/L-MCNC: 1.6 MG/DL
MICROALBUMIN/CREAT 24H UR-RTO: 14 MG/G
MONOCYTES # BLD AUTO: 0.44 K/UL
MONOCYTES NFR BLD AUTO: 5.8 %
NEUTROPHILS # BLD AUTO: 5.28 K/UL
NEUTROPHILS NFR BLD AUTO: 70.2 %
NONHDLC SERPL-MCNC: 93 MG/DL
PLATELET # BLD AUTO: 247 K/UL
POTASSIUM SERPL-SCNC: 4.5 MMOL/L
PROT SERPL-MCNC: 6.6 G/DL
RBC # BLD: 4.44 M/UL
RBC # FLD: 15.7 %
SODIUM SERPL-SCNC: 144 MMOL/L
TRIGL SERPL-MCNC: 81 MG/DL
TSH SERPL-ACNC: 4.13 UIU/ML
URATE SERPL-MCNC: 2.7 MG/DL
WBC # FLD AUTO: 7.53 K/UL

## 2022-07-06 ENCOUNTER — APPOINTMENT (OUTPATIENT)
Dept: ORTHOPEDIC SURGERY | Facility: CLINIC | Age: 87
End: 2022-07-06

## 2022-07-06 DIAGNOSIS — M75.01 ADHESIVE CAPSULITIS OF RIGHT SHOULDER: ICD-10-CM

## 2022-07-06 PROCEDURE — 99204 OFFICE O/P NEW MOD 45 MIN: CPT | Mod: 25

## 2022-07-06 PROCEDURE — 73030 X-RAY EXAM OF SHOULDER: CPT | Mod: RT

## 2022-07-06 PROCEDURE — 20611 DRAIN/INJ JOINT/BURSA W/US: CPT

## 2022-07-06 PROCEDURE — J3490M: CUSTOM

## 2022-07-06 PROCEDURE — 73010 X-RAY EXAM OF SHOULDER BLADE: CPT | Mod: RT

## 2022-07-06 NOTE — HISTORY OF PRESENT ILLNESS
[de-identified] : 89 year old female  (RHD, retired)  right shoulder pain since 4/2022 with no JUAN MIGUEL.  pain located superior and lateral with associated ROM and strength loss.  worse at night  and with overhead lifting.  better at rest.  has tried Advil with some improvement\par \par H/O diabetes, hypothyroidism\par

## 2022-07-06 NOTE — ASSESSMENT
[FreeTextEntry1] : Right X-Ray Examination of the SHOULDER (2 views):  no fractures, subluxations or dislocations. GH deg\par X-Ray Examination of the SCAPULA 1 or 2 views shows: no significant abnormalities, ac spur\par \par severe GH deg\par \par - We discussed their diagnosis and treatment options at length. \par - We will continue conservative treatment with a course of PT, icing, and anti-inflammatory medication.\par - The patient was provided with a prescription to work on scapular strengthening and rotator cuff strengthening.\par - The patient was advised to let pain guide the gradual advancement of activities. \par - We also discussed the possible of a corticosteroid injection in order to help decrease inflammation and pain so that they can perform better therapy.\par - The risks, benefits, and alternatives to corticosteroid injection were reviewed with the patient and they wished to proceed with this treatment course. \par - Follow up as needed \par (could benefit from TSA)\par \par \par

## 2022-07-06 NOTE — IMAGING
[de-identified] : \par RIGHT  SHOULDER\par Inspection: No swelling. \par Palpation : Tenderness is noted at the anterior shoulder and lateral shoulder.\par Range of motion: There is pain with range of motion. There is decreased range of motion with pain. Active motion equals passive motion. \par , ER 40\par Strength: There is pain, weakness, and discomfort with strength testing.\par Neurological testings: motor and sensor intact distally.\par Ligament Stability and Special Testss: \par There is positive arc of pain. \par Shoulder apprehension: neg\par Shoulder relocation: neg\par Pham’s test: pos\par Biceps Active test: neg\par [place] Labral Shear: neg\par Impingement testing: pos\par Stevo testing: pos\par Whipple: pos\par Cross Body Adduction: neg\par \par

## 2022-07-20 ENCOUNTER — APPOINTMENT (OUTPATIENT)
Dept: ORTHOPEDIC SURGERY | Facility: CLINIC | Age: 87
End: 2022-07-20

## 2022-07-20 VITALS — HEIGHT: 63.5 IN | WEIGHT: 175 LBS | BODY MASS INDEX: 30.62 KG/M2

## 2022-07-20 PROCEDURE — 73590 X-RAY EXAM OF LOWER LEG: CPT | Mod: LT

## 2022-07-20 PROCEDURE — 99203 OFFICE O/P NEW LOW 30 MIN: CPT | Mod: 25

## 2022-07-20 PROCEDURE — 73564 X-RAY EXAM KNEE 4 OR MORE: CPT | Mod: LT

## 2022-07-20 PROCEDURE — 20611 DRAIN/INJ JOINT/BURSA W/US: CPT

## 2022-07-20 NOTE — HISTORY OF PRESENT ILLNESS
[10] : 10 [1] : 2 [Sharp] : sharp [Meds] : meds [Ice] : ice [de-identified] : 89 year old female with pain in the left knee, symptoms started a couple days ago, pain with walking, stairs, getting up form a seated position. Symptoms radiate into the shin and calf region, no numbness or tingling into the foot, nothing radiates up into the thigh. No injury, tried advil withut help and ice [] : no [FreeTextEntry1] : left calf and knee  [FreeTextEntry5] : pt states on monday she started to have pain to her left knee and calf  [FreeTextEntry7] : down to the calf  [FreeTextEntry9] : advil  [de-identified] : movement

## 2022-07-20 NOTE — PHYSICAL EXAM
[5___] : hamstring 5[unfilled]/5 [All Views] : anteroposterior, lateral, skyline, and anteroposterior standing [5mm] : openinmm [] : patient ambulates with assistive device [Left] : left knee [Degenerative change] : Degenerative change [FreeTextEntry9] : adv PF [TWNoteComboBox7] : flexion 120 degrees [de-identified] : extension 0 degrees

## 2022-07-20 NOTE — PROCEDURE
[FreeTextEntry3] : Large Joint Injection / Aspiration: Lidocaine, Zilretta Ultrasound and Guidance\par Lidocaine: 3 cc. Needle size: 18 gauge , 1.5 inch. \par Zilretta: An Injection of Zilretta 32 Units 5ml , \par was injected into the left knee \par Ultrasound Guidance was used for the following reasons: Cyst aspiration. \par Ultrasound guided injection was performed of the knee, visualization of the needle and placement of injection was performed without complication. \par \par Large Joint Injection was performed because of pain and inflammation. Anesthesia: ethyl chloride sprayed topically.. Patient has tried OTC's including aspirin, Ibuprofen, Aleve etc or prescription NSAIDS, and/or exercises at home and/ or physical therapy without satisfactory response. After verbal consent using sterile preparation and technique. The risks, benefits, and alternatives to aspiration were explained in full to the patient. Risks outlined include but are not limited to infection, sepsis, bleeding, scarring, skin discoloration, temporary increase in pain, syncopal episode, failure to resolve symptoms, allergic reaction and symptom recurrence. Patient understood the risks. All questions were answered. After discussion of options, patient requested an aspiration. Oral informed consent was obtained and sterile prep was done of the injection site. Sterile technique was utilized for the procedure including the preparation of the solutions used for the aspiration. Patient tolerated the procedure well. Advised to ice the injection site this evening. Prep with betadine locally to site. Sterile technique used. Patient tolerated procedure well. Post Procedure Instructions: Patient was advised to call if redness, pain, or fever occur and apply ice for 15 min. out of every hour for the next 12-24 hours as tolerated. patient was advised to rest the joint(s) for 1 days.

## 2022-07-20 NOTE — DISCUSSION/SUMMARY
[de-identified] : Patient allowed to gently start resuming activities.\par Discussed change to medication prescription and usage. \par Offered cortisone steroid injection. \par Bracing options discussed with patient. \par Hyaluronic Acid inj pamphlet given to pt.

## 2022-07-22 ENCOUNTER — APPOINTMENT (OUTPATIENT)
Dept: ORTHOPEDIC SURGERY | Facility: CLINIC | Age: 87
End: 2022-07-22

## 2022-08-22 ENCOUNTER — APPOINTMENT (OUTPATIENT)
Dept: ORTHOPEDIC SURGERY | Facility: CLINIC | Age: 87
End: 2022-08-22

## 2022-08-22 VITALS — WEIGHT: 175 LBS | HEIGHT: 63.5 IN | BODY MASS INDEX: 30.62 KG/M2

## 2022-08-22 DIAGNOSIS — M17.12 UNILATERAL PRIMARY OSTEOARTHRITIS, LEFT KNEE: ICD-10-CM

## 2022-08-22 PROCEDURE — 99214 OFFICE O/P EST MOD 30 MIN: CPT | Mod: 25

## 2022-08-22 PROCEDURE — 20611 DRAIN/INJ JOINT/BURSA W/US: CPT | Mod: RT

## 2022-08-22 NOTE — HISTORY OF PRESENT ILLNESS
[8] : 8 [2] : 2 [Sharp] : sharp [de-identified] : CSI L knee did very well, the R doin ok, the R shoulder did not respond to CSI by Dr Braga, on no meds [] : no [FreeTextEntry1] : left knee [FreeTextEntry5] : Patient is here today for follow up left knee [de-identified] : motion [de-identified] : none

## 2022-08-22 NOTE — PHYSICAL EXAM
[Right] : right shoulder [4___] : external rotation 4[unfilled]/5 [] : positive impingement testing [TWNoteComboBox7] : active forward flexion 130 degrees [TWNoteComboBox6] : internal rotation 0 degrees [de-identified] : external rotation 0 degrees

## 2022-08-22 NOTE — ASSESSMENT
[FreeTextEntry1] : reviewed XRAYs end stage GH\par Patient allowed to gently start resuming activities.\par Discussed change to medication prescription and usage. \par Offered cortisone steroid injection. \par Hyaluronic Acid inj pamphlet given to pt.\par

## 2022-08-29 ENCOUNTER — APPOINTMENT (OUTPATIENT)
Dept: ORTHOPEDIC SURGERY | Facility: CLINIC | Age: 87
End: 2022-08-29

## 2022-08-29 VITALS — HEIGHT: 63.5 IN | WEIGHT: 175 LBS | BODY MASS INDEX: 30.62 KG/M2

## 2022-08-29 PROCEDURE — 99212 OFFICE O/P EST SF 10 MIN: CPT | Mod: 25

## 2022-08-29 PROCEDURE — 20611 DRAIN/INJ JOINT/BURSA W/US: CPT | Mod: RT

## 2022-08-29 NOTE — ASSESSMENT
[FreeTextEntry1] : end stage GH\par Patient allowed to gently start resuming activities.\par \par f/u 1 week to continue the series \par

## 2022-08-29 NOTE — HISTORY OF PRESENT ILLNESS
[Supartz] : Supartz [de-identified] : 8/29/22: She is here for supartz #2 of the right shoulder. She tolerated the previous injection well.  [] : no [FreeTextEntry1] : right shoulder  [de-identified] : 08/22/22 [TWNoteComboBox1] : 20%

## 2022-08-29 NOTE — PHYSICAL EXAM
[Right] : right shoulder [4___] : external rotation 4[unfilled]/5 [] : no ecchymosis [TWNoteComboBox7] : active forward flexion 130 degrees [TWNoteComboBox6] : internal rotation 0 degrees [de-identified] : external rotation 0 degrees

## 2022-09-06 ENCOUNTER — APPOINTMENT (OUTPATIENT)
Dept: ORTHOPEDIC SURGERY | Facility: CLINIC | Age: 87
End: 2022-09-06

## 2022-09-06 VITALS — BODY MASS INDEX: 30.62 KG/M2 | WEIGHT: 175 LBS | HEIGHT: 63.5 IN

## 2022-09-06 DIAGNOSIS — M19.011 PRIMARY OSTEOARTHRITIS, RIGHT SHOULDER: ICD-10-CM

## 2022-09-06 PROCEDURE — 99024 POSTOP FOLLOW-UP VISIT: CPT

## 2022-09-06 PROCEDURE — 20611 DRAIN/INJ JOINT/BURSA W/US: CPT

## 2022-09-06 NOTE — DISCUSSION/SUMMARY
[de-identified] : Patient allowed to gently start resuming activities. \par Discussed change to medication prescription and usage. \par Activity modification as needed\par '

## 2022-09-06 NOTE — HISTORY OF PRESENT ILLNESS
[3] : 3 [Supartz] : Supartz [de-identified] : 8/29/22: She is here for supartz #3 of the right shoulder. She tolerated the previous injection well.  [] : no [de-identified] : 08/29/2022 [de-identified] : right shoulder

## 2022-09-06 NOTE — PHYSICAL EXAM
[Right] : right shoulder [4___] : external rotation 4[unfilled]/5 [] : no ecchymosis [TWNoteComboBox7] : active forward flexion 130 degrees [TWNoteComboBox6] : internal rotation 0 degrees [de-identified] : external rotation 0 degrees

## 2022-10-03 ENCOUNTER — APPOINTMENT (OUTPATIENT)
Dept: ORTHOPEDIC SURGERY | Facility: CLINIC | Age: 87
End: 2022-10-03

## 2022-10-21 ENCOUNTER — APPOINTMENT (OUTPATIENT)
Dept: INTERNAL MEDICINE | Facility: CLINIC | Age: 87
End: 2022-10-21

## 2022-10-21 VITALS
DIASTOLIC BLOOD PRESSURE: 60 MMHG | WEIGHT: 170 LBS | HEIGHT: 63.5 IN | HEART RATE: 68 BPM | SYSTOLIC BLOOD PRESSURE: 120 MMHG | OXYGEN SATURATION: 92 % | BODY MASS INDEX: 29.75 KG/M2

## 2022-10-21 DIAGNOSIS — Z00.00 ENCOUNTER FOR GENERAL ADULT MEDICAL EXAMINATION W/OUT ABNORMAL FINDINGS: ICD-10-CM

## 2022-10-21 PROCEDURE — 36415 COLL VENOUS BLD VENIPUNCTURE: CPT

## 2022-10-21 PROCEDURE — 99213 OFFICE O/P EST LOW 20 MIN: CPT | Mod: 25

## 2022-10-21 NOTE — HISTORY OF PRESENT ILLNESS
[FreeTextEntry8] : 89 year old female here with daughter for medication renewal. patient came in with daughter no complaints at this time. feels well overall wants blood work done. no chest pain no sob. patient needs labetalol refilled. up to date on vaccine for covid and influneza.

## 2022-10-23 LAB
25(OH)D3 SERPL-MCNC: 44.8 NG/ML
ALBUMIN SERPL ELPH-MCNC: 4.1 G/DL
ALP BLD-CCNC: 76 U/L
ALT SERPL-CCNC: 15 U/L
ANION GAP SERPL CALC-SCNC: 12 MMOL/L
AST SERPL-CCNC: 19 U/L
BASOPHILS # BLD AUTO: 0.06 K/UL
BASOPHILS NFR BLD AUTO: 0.8 %
BILIRUB SERPL-MCNC: 0.6 MG/DL
BUN SERPL-MCNC: 30 MG/DL
CALCIUM SERPL-MCNC: 9.4 MG/DL
CHLORIDE SERPL-SCNC: 104 MMOL/L
CHOLEST SERPL-MCNC: 154 MG/DL
CO2 SERPL-SCNC: 26 MMOL/L
CREAT SERPL-MCNC: 1.55 MG/DL
EGFR: 31 ML/MIN/1.73M2
EOSINOPHIL # BLD AUTO: 0.45 K/UL
EOSINOPHIL NFR BLD AUTO: 6.1 %
ESTIMATED AVERAGE GLUCOSE: 131 MG/DL
FOLATE SERPL-MCNC: >20 NG/ML
GLUCOSE SERPL-MCNC: 128 MG/DL
HBA1C MFR BLD HPLC: 6.2 %
HCT VFR BLD CALC: 39.2 %
HDLC SERPL-MCNC: 68 MG/DL
HGB BLD-MCNC: 12.3 G/DL
IMM GRANULOCYTES NFR BLD AUTO: 0.3 %
LDLC SERPL CALC-MCNC: 74 MG/DL
LYMPHOCYTES # BLD AUTO: 1.08 K/UL
LYMPHOCYTES NFR BLD AUTO: 14.6 %
MAGNESIUM SERPL-MCNC: 1.7 MG/DL
MAN DIFF?: NORMAL
MCHC RBC-ENTMCNC: 29.6 PG
MCHC RBC-ENTMCNC: 31.4 GM/DL
MCV RBC AUTO: 94.5 FL
MONOCYTES # BLD AUTO: 0.72 K/UL
MONOCYTES NFR BLD AUTO: 9.7 %
NEUTROPHILS # BLD AUTO: 5.06 K/UL
NEUTROPHILS NFR BLD AUTO: 68.5 %
NONHDLC SERPL-MCNC: 86 MG/DL
PLATELET # BLD AUTO: 246 K/UL
POTASSIUM SERPL-SCNC: 4.3 MMOL/L
PROT SERPL-MCNC: 6.4 G/DL
RBC # BLD: 4.15 M/UL
RBC # FLD: 15.2 %
SODIUM SERPL-SCNC: 143 MMOL/L
TRIGL SERPL-MCNC: 59 MG/DL
TSH SERPL-ACNC: 2.89 UIU/ML
VIT B12 SERPL-MCNC: 871 PG/ML
WBC # FLD AUTO: 7.39 K/UL

## 2022-11-14 ENCOUNTER — APPOINTMENT (OUTPATIENT)
Dept: ORTHOPEDIC SURGERY | Facility: CLINIC | Age: 87
End: 2022-11-14

## 2022-12-15 ENCOUNTER — RX RENEWAL (OUTPATIENT)
Age: 87
End: 2022-12-15

## 2023-03-03 ENCOUNTER — APPOINTMENT (OUTPATIENT)
Dept: FAMILY MEDICINE | Facility: CLINIC | Age: 88
End: 2023-03-03
Payer: MEDICARE

## 2023-03-03 VITALS
HEART RATE: 81 BPM | OXYGEN SATURATION: 93 % | WEIGHT: 170 LBS | HEIGHT: 63.5 IN | DIASTOLIC BLOOD PRESSURE: 72 MMHG | BODY MASS INDEX: 29.75 KG/M2 | SYSTOLIC BLOOD PRESSURE: 130 MMHG | TEMPERATURE: 98 F

## 2023-03-03 PROCEDURE — 99214 OFFICE O/P EST MOD 30 MIN: CPT | Mod: 25

## 2023-03-03 PROCEDURE — 36415 COLL VENOUS BLD VENIPUNCTURE: CPT

## 2023-03-03 NOTE — REVIEW OF SYSTEMS
[Negative] : Genitourinary [Fever] : no fever [Chills] : no chills [Nasal Discharge] : no nasal discharge [Postnasal Drip] : no postnasal drip [Chest Pain] : no chest pain [Palpitations] : no palpitations [Shortness Of Breath] : no shortness of breath [Headache] : no headache [Dizziness] : no dizziness [FreeTextEntry9] : legs bother her-rolling walker-does not want to PT evaluation

## 2023-03-03 NOTE — PLAN
[FreeTextEntry1] : Will follow up labwork drawn in office today.\par \par Discussed reducing lantus to 14units. \par \par Declines PT referral at this time.  Discussed gentle stretching before bed. \par \par Will adjust meds based on labs. \par Patient expressed understanding of plan.\par \par

## 2023-03-03 NOTE — PHYSICAL EXAM
[No Acute Distress] : no acute distress [Well Developed] : well developed [Normal Oropharynx] : the oropharynx was normal [No Edema] : there was no peripheral edema [No Extremity Clubbing/Cyanosis] : no extremity clubbing/cyanosis [Normal] : affect was normal and insight and judgment were intact [No Lymphadenopathy] : no lymphadenopathy [de-identified] : ribs-no rash [de-identified] : no erythema

## 2023-03-03 NOTE — HISTORY OF PRESENT ILLNESS
[Family Member] : family member [FreeTextEntry1] : Here for follow-up; needs med refills.\par  [de-identified] : Here for follow-up; needs med refills.\par Here with daughter Luz.\par  \par Taking B12 and magnesium; vitamin c and vitamin d\par Just saw Podiatry 2 weeks ago\par Optho-saw Monday this week.  \par Holding labetalol if BP is low. \par Lantus 16 units, reports she has had 1 episode of a low sugar.  Was not checking her sugar that week.  \par \par Her legs still bother her, but she does not want to do PT at this time.  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  Has not been on in a few years.   Gets rib pain on left side for many years. \par

## 2023-03-05 NOTE — ED ADULT NURSE REASSESSMENT NOTE - NS ED NURSE REASSESS COMMENT FT1
Problem: MOBILITY - ADULT  Goal: Maintain or return to baseline ADL function  Description: INTERVENTIONS:  -  Assess patient's ability to carry out ADLs; assess patient's baseline for ADL function and identify physical deficits which impact ability to perform ADLs (bathing, care of mouth/teeth, toileting, grooming, dressing, etc )  - Assess/evaluate cause of self-care deficits   - Assess range of motion  - Assess patient's mobility; develop plan if impaired  - Assess patient's need for assistive devices and provide as appropriate  - Encourage maximum independence but intervene and supervise when necessary  - Involve family in performance of ADLs  - Assess for home care needs following discharge   - Consider OT consult to assist with ADL evaluation and planning for discharge  - Provide patient education as appropriate  Outcome: Progressing  Goal: Maintains/Returns to pre admission functional level  Description: INTERVENTIONS:  - Perform BMAT or MOVE assessment daily    - Set and communicate daily mobility goal to care team and patient/family/caregiver  - Collaborate with rehabilitation services on mobility goals if consulted  - Perform Range of Motion 3 times a day  - Reposition patient every 2 hours    - Dangle patient 1 times a day  - Stand patient 1 times a day  - Ambulate patient 1 times a day  - Out of bed to chair 1 times a day   - Out of bed for meals 1 times a day  - Out of bed for toileting  - Record patient progress and toleration of activity level   Outcome: Progressing     Problem: PAIN - ADULT  Goal: Verbalizes/displays adequate comfort level or baseline comfort level  Description: Interventions:  - Encourage patient to monitor pain and request assistance  - Assess pain using appropriate pain scale  - Administer analgesics based on type and severity of pain and evaluate response  - Implement non-pharmacological measures as appropriate and evaluate response  - Consider cultural and social influences on pain and pain management  - Notify physician/advanced practitioner if interventions unsuccessful or patient reports new pain  Outcome: Progressing     Problem: INFECTION - ADULT  Goal: Absence or prevention of progression during hospitalization  Description: INTERVENTIONS:  - Assess and monitor for signs and symptoms of infection  - Monitor lab/diagnostic results  - Monitor all insertion sites, i e  indwelling lines, tubes, and drains  - Monitor endotracheal if appropriate and nasal secretions for changes in amount and color  - [place] appropriate cooling/warming therapies per order  - Administer medications as ordered  - Instruct and encourage patient and family to use good hand hygiene technique  - Identify and instruct in appropriate isolation precautions for identified infection/condition  Outcome: Progressing     Problem: SAFETY ADULT  Goal: Maintain or return to baseline ADL function  Description: INTERVENTIONS:  -  Assess patient's ability to carry out ADLs; assess patient's baseline for ADL function and identify physical deficits which impact ability to perform ADLs (bathing, care of mouth/teeth, toileting, grooming, dressing, etc )  - Assess/evaluate cause of self-care deficits   - Assess range of motion  - Assess patient's mobility; develop plan if impaired  - Assess patient's need for assistive devices and provide as appropriate  - Encourage maximum independence but intervene and supervise when necessary  - Involve family in performance of ADLs  - Assess for home care needs following discharge   - Consider OT consult to assist with ADL evaluation and planning for discharge  - Provide patient education as appropriate  Outcome: Progressing  Goal: Maintains/Returns to pre admission functional level  Description: INTERVENTIONS:  - Perform BMAT or MOVE assessment daily    - Set and communicate daily mobility goal to care team and patient/family/caregiver     - Collaborate with rehabilitation services on mobility goals if consulted  - Perform Range of Motion 3 times a day  - Reposition patient every 2 hours    - Dangle patient 1 times a day  - Stand patient 1 times a day  - Ambulate patient 1 times a day  - Out of bed to chair 1 times a day   - Out of bed for meals 1 times a day  - Out of bed for toileting  - Record patient progress and toleration of activity level   Outcome: Progressing  Goal: Patient will remain free of falls  Description: INTERVENTIONS:  - Educate patient/family on patient safety including physical limitations  - Instruct patient to call for assistance with activity   - Consult OT/PT to assist with strengthening/mobility   - Keep Call bell within reach  - Keep bed low and locked with side rails adjusted as appropriate  - Keep care items and personal belongings within reach  - Initiate and maintain comfort rounds  - Make Fall Risk Sign visible to staff  - Offer Toileting every 2 Hours, in advance of need  - Initiate/Maintain bed alarm  - Obtain necessary fall risk management equipment: nonskid footwear  - Apply yellow socks and bracelet for high fall risk patients  - Consider moving patient to room near nurses station  Outcome: Progressing     Problem: DISCHARGE PLANNING  Goal: Discharge to home or other facility with appropriate resources  Description: INTERVENTIONS:  - Identify barriers to discharge w/patient and caregiver  - Arrange for needed discharge resources and transportation as appropriate  - Identify discharge learning needs (meds, wound care, etc )  - Arrange for interpretive services to assist at discharge as needed  - Refer to Case Management Department for coordinating discharge planning if the patient needs post-hospital services based on physician/advanced practitioner order or complex needs related to functional status, cognitive ability, or social support system  Outcome: Progressing     Problem: Knowledge Deficit  Goal: Patient/family/caregiver demonstrates understanding of disease process, treatment plan, medications, and discharge instructions  Description: Complete learning assessment and assess knowledge base  Interventions:  - Provide teaching at level of understanding  - Provide teaching via preferred learning methods  Outcome: Progressing     Problem: Prexisting or High Potential for Compromised Skin Integrity  Goal: Skin integrity is maintained or improved  Description: INTERVENTIONS:  - Identify patients at risk for skin breakdown  - Assess and monitor skin integrity  - Assess and monitor nutrition and hydration status  - Monitor labs   - Assess for incontinence   - Turn and reposition patient  - Assist with mobility/ambulation  - Relieve pressure over bony prominences  - Avoid friction and shearing  - Provide appropriate hygiene as needed including keeping skin clean and dry  - Evaluate need for skin moisturizer/barrier cream  - Collaborate with interdisciplinary team   - Patient/family teaching  - Consider wound care consult   Outcome: Progressing     Problem: METABOLIC, FLUID AND ELECTROLYTES - ADULT  Goal: Electrolytes maintained within normal limits  Description: INTERVENTIONS:  - Monitor labs and assess patient for signs and symptoms of electrolyte imbalances  - Administer electrolyte replacement as ordered  - Monitor response to electrolyte replacements, including repeat lab results as appropriate  - Instruct patient on fluid and nutrition as appropriate  Outcome: Progressing  Goal: Fluid balance maintained  Description: INTERVENTIONS:  - Monitor labs   - Monitor I/O and WT  - Instruct patient on fluid and nutrition as appropriate  - Assess for signs & symptoms of volume excess or deficit  Outcome: Progressing     Problem: Nutrition/Hydration-ADULT  Goal: Nutrient/Hydration intake appropriate for improving, restoring or maintaining nutritional needs  Description: Monitor and assess patient's nutrition/hydration status for malnutrition   Collaborate with interdisciplinary team and initiate plan and interventions as ordered  Monitor patient's weight and dietary intake as ordered or per policy  Utilize nutrition screening tool and intervene as necessary  Determine patient's food preferences and provide high-protein, high-caloric foods as appropriate       INTERVENTIONS:  - Monitor oral intake, urinary output, labs, and treatment plans  - Assess nutrition and hydration status and recommend course of action  - Evaluate amount of meals eaten  - Assist patient with eating if necessary   - Allow adequate time for meals  - Recommend/ encourage appropriate diets, oral nutritional supplements, and vitamin/mineral supplements  - Order, calculate, and assess calorie counts as needed  - Recommend, monitor, and adjust tube feedings and TPN/PPN based on assessed needs  - Assess need for intravenous fluids  - Provide specific nutrition/hydration education as appropriate  - Include patient/family/caregiver in decisions related to nutrition  Outcome: Progressing Pt received in report alert and oriented and resting in bed with the c/o right knee pain. As per Md's orders meds given and tolerated well. Pt now awaiting PT eval. nursing care ongoing and safety maintained.

## 2023-03-06 LAB
ALBUMIN SERPL ELPH-MCNC: 4.3 G/DL
ALP BLD-CCNC: 98 U/L
ALT SERPL-CCNC: 13 U/L
ANION GAP SERPL CALC-SCNC: 15 MMOL/L
AST SERPL-CCNC: 20 U/L
BASOPHILS # BLD AUTO: 0.05 K/UL
BASOPHILS NFR BLD AUTO: 0.6 %
BILIRUB SERPL-MCNC: 0.8 MG/DL
BUN SERPL-MCNC: 36 MG/DL
CALCIUM SERPL-MCNC: 9.6 MG/DL
CHLORIDE SERPL-SCNC: 101 MMOL/L
CHOLEST SERPL-MCNC: 161 MG/DL
CO2 SERPL-SCNC: 26 MMOL/L
CREAT SERPL-MCNC: 1.54 MG/DL
EGFR: 31 ML/MIN/1.73M2
EOSINOPHIL # BLD AUTO: 0.42 K/UL
EOSINOPHIL NFR BLD AUTO: 5.2 %
ESTIMATED AVERAGE GLUCOSE: 140 MG/DL
GLUCOSE SERPL-MCNC: 133 MG/DL
HBA1C MFR BLD HPLC: 6.5 %
HCT VFR BLD CALC: 40.5 %
HDLC SERPL-MCNC: 80 MG/DL
HGB BLD-MCNC: 12.6 G/DL
IMM GRANULOCYTES NFR BLD AUTO: 0.2 %
LDLC SERPL CALC-MCNC: 69 MG/DL
LYMPHOCYTES # BLD AUTO: 1.15 K/UL
LYMPHOCYTES NFR BLD AUTO: 14.3 %
MAN DIFF?: NORMAL
MCHC RBC-ENTMCNC: 29.3 PG
MCHC RBC-ENTMCNC: 31.1 GM/DL
MCV RBC AUTO: 94.2 FL
MONOCYTES # BLD AUTO: 0.51 K/UL
MONOCYTES NFR BLD AUTO: 6.3 %
NEUTROPHILS # BLD AUTO: 5.9 K/UL
NEUTROPHILS NFR BLD AUTO: 73.4 %
NONHDLC SERPL-MCNC: 81 MG/DL
PLATELET # BLD AUTO: 252 K/UL
POTASSIUM SERPL-SCNC: 4.4 MMOL/L
PROT SERPL-MCNC: 6.8 G/DL
RBC # BLD: 4.3 M/UL
RBC # FLD: 15.5 %
SODIUM SERPL-SCNC: 142 MMOL/L
TRIGL SERPL-MCNC: 57 MG/DL
TSH SERPL-ACNC: 2.77 UIU/ML
WBC # FLD AUTO: 8.05 K/UL

## 2023-03-07 LAB — URATE SERPL-MCNC: 2.4 MG/DL

## 2023-03-11 ENCOUNTER — RX RENEWAL (OUTPATIENT)
Age: 88
End: 2023-03-11

## 2023-03-11 RX ORDER — PEN NEEDLE, DIABETIC 31 GX5/16"
31G X 8 MM NEEDLE, DISPOSABLE MISCELLANEOUS
Qty: 90 | Refills: 0 | Status: ACTIVE | COMMUNITY
Start: 2023-03-11 | End: 1900-01-01

## 2023-06-22 ENCOUNTER — APPOINTMENT (OUTPATIENT)
Dept: FAMILY MEDICINE | Facility: CLINIC | Age: 88
End: 2023-06-22
Payer: MEDICARE

## 2023-06-22 VITALS
HEIGHT: 63 IN | SYSTOLIC BLOOD PRESSURE: 138 MMHG | BODY MASS INDEX: 30.51 KG/M2 | OXYGEN SATURATION: 96 % | TEMPERATURE: 97.5 F | DIASTOLIC BLOOD PRESSURE: 76 MMHG | HEART RATE: 62 BPM | WEIGHT: 172.19 LBS

## 2023-06-22 DIAGNOSIS — N18.30 CHRONIC KIDNEY DISEASE, STAGE 3 UNSPECIFIED: ICD-10-CM

## 2023-06-22 PROCEDURE — 99214 OFFICE O/P EST MOD 30 MIN: CPT | Mod: 25

## 2023-06-22 PROCEDURE — 36415 COLL VENOUS BLD VENIPUNCTURE: CPT

## 2023-06-22 RX ORDER — AMITRIPTYLINE HYDROCHLORIDE 25 MG/1
25 TABLET, FILM COATED ORAL
Qty: 90 | Refills: 0 | Status: DISCONTINUED | COMMUNITY
Start: 2017-07-28 | End: 2023-06-22

## 2023-06-22 NOTE — PLAN
[FreeTextEntry1] : Will follow up labwork drawn in office today.\par \par DM-seeing Podiatry, has upcoming eye exam.\par \par Advised patient to start Debrox/Carbamide peroxide drops 3-4 days prior to returning to office for ear wax irrigation of impacted cerumen. \par  \par Will adjust meds based on labs. \par Patient expressed understanding of plan.\par

## 2023-06-22 NOTE — HISTORY OF PRESENT ILLNESS
[Family Member] : family member [FreeTextEntry1] : Here for follow-up; needs med refills.\par  [de-identified] : Here for follow-up; needs med refills.\par Here with daughter. \par Will be seeing Podiatry Saturday\par Has Eye doctor appointment Upcoming\par \par Has been taking 14 units of Lantus\par Sugar 99-mid 100s.  \par No further episodes of lows. \par \par -Somedays holding the second dose labetalol\par Taking OTC Magnesium. \par Has been feeling well. \par \par Taking Ensure as supplements.

## 2023-06-22 NOTE — REVIEW OF SYSTEMS
[Fever] : no fever [Chills] : no chills [Nasal Discharge] : no nasal discharge [Sore Throat] : no sore throat [Chest Pain] : no chest pain [Palpitations] : no palpitations [Wheezing] : no wheezing [Cough] : no cough [Nausea] : no nausea [Diarrhea] : diarrhea [Vomiting] : no vomiting [Dysuria] : no dysuria [Headache] : no headache [Dizziness] : no dizziness [FreeTextEntry7] : stool softener as needed [de-identified] : 8 hours of sleep of

## 2023-06-22 NOTE — PHYSICAL EXAM
[No Acute Distress] : no acute distress [Well Developed] : well developed [No Edema] : there was no peripheral edema [No Extremity Clubbing/Cyanosis] : no extremity clubbing/cyanosis [Normal] : affect was normal and insight and judgment were intact [Normal Oropharynx] : the oropharynx was normal [No Lymphadenopathy] : no lymphadenopathy [de-identified] : impacted cerumen b/l

## 2023-06-26 LAB
ALBUMIN SERPL ELPH-MCNC: 4.4 G/DL
ALP BLD-CCNC: 83 U/L
ALT SERPL-CCNC: 18 U/L
ANION GAP SERPL CALC-SCNC: 14 MMOL/L
AST SERPL-CCNC: 22 U/L
BILIRUB SERPL-MCNC: 0.6 MG/DL
BUN SERPL-MCNC: 33 MG/DL
CALCIUM SERPL-MCNC: 9.3 MG/DL
CHLORIDE SERPL-SCNC: 103 MMOL/L
CHOLEST SERPL-MCNC: 155 MG/DL
CO2 SERPL-SCNC: 26 MMOL/L
CREAT SERPL-MCNC: 1.67 MG/DL
CREAT SPEC-SCNC: 103 MG/DL
EGFR: 28 ML/MIN/1.73M2
ESTIMATED AVERAGE GLUCOSE: 146 MG/DL
GLUCOSE SERPL-MCNC: 155 MG/DL
HBA1C MFR BLD HPLC: 6.7 %
HDLC SERPL-MCNC: 70 MG/DL
LDLC SERPL CALC-MCNC: 71 MG/DL
MICROALBUMIN 24H UR DL<=1MG/L-MCNC: 5.6 MG/DL
MICROALBUMIN/CREAT 24H UR-RTO: 55 MG/G
NONHDLC SERPL-MCNC: 84 MG/DL
POTASSIUM SERPL-SCNC: 4.8 MMOL/L
PROT SERPL-MCNC: 6.8 G/DL
SODIUM SERPL-SCNC: 143 MMOL/L
TRIGL SERPL-MCNC: 66 MG/DL
TSH SERPL-ACNC: 2.58 UIU/ML

## 2023-09-15 ENCOUNTER — APPOINTMENT (OUTPATIENT)
Dept: FAMILY MEDICINE | Facility: CLINIC | Age: 88
End: 2023-09-15

## 2023-10-11 RX ORDER — PEN NEEDLE, DIABETIC 29 G X1/2"
32G X 4 MM NEEDLE, DISPOSABLE MISCELLANEOUS
Qty: 1 | Refills: 0 | Status: ACTIVE | COMMUNITY
Start: 1900-01-01 | End: 1900-01-01

## 2023-10-16 ENCOUNTER — APPOINTMENT (OUTPATIENT)
Dept: FAMILY MEDICINE | Facility: CLINIC | Age: 88
End: 2023-10-16
Payer: MEDICARE

## 2023-10-16 VITALS
HEIGHT: 63 IN | BODY MASS INDEX: 29.59 KG/M2 | SYSTOLIC BLOOD PRESSURE: 106 MMHG | DIASTOLIC BLOOD PRESSURE: 56 MMHG | WEIGHT: 167 LBS | HEART RATE: 58 BPM | TEMPERATURE: 97.8 F | OXYGEN SATURATION: 95 % | RESPIRATION RATE: 15 BRPM

## 2023-10-16 DIAGNOSIS — I10 ESSENTIAL (PRIMARY) HYPERTENSION: ICD-10-CM

## 2023-10-16 DIAGNOSIS — I11.9 HYPERTENSIVE HEART DISEASE W/OUT HEART FAILURE: ICD-10-CM

## 2023-10-16 PROCEDURE — 36415 COLL VENOUS BLD VENIPUNCTURE: CPT

## 2023-10-16 PROCEDURE — 99214 OFFICE O/P EST MOD 30 MIN: CPT | Mod: 25

## 2023-10-18 LAB
ALBUMIN SERPL ELPH-MCNC: 4.1 G/DL
ALP BLD-CCNC: 84 U/L
ALT SERPL-CCNC: 13 U/L
ANION GAP SERPL CALC-SCNC: 12 MMOL/L
AST SERPL-CCNC: 17 U/L
BASOPHILS # BLD AUTO: 0.06 K/UL
BASOPHILS NFR BLD AUTO: 0.8 %
BILIRUB SERPL-MCNC: 0.7 MG/DL
BUN SERPL-MCNC: 32 MG/DL
CALCIUM SERPL-MCNC: 9.6 MG/DL
CHLORIDE SERPL-SCNC: 103 MMOL/L
CHOLEST SERPL-MCNC: 146 MG/DL
CO2 SERPL-SCNC: 28 MMOL/L
CREAT SERPL-MCNC: 1.72 MG/DL
EGFR: 27 ML/MIN/1.73M2
EOSINOPHIL # BLD AUTO: 0.57 K/UL
EOSINOPHIL NFR BLD AUTO: 7.4 %
ESTIMATED AVERAGE GLUCOSE: 146 MG/DL
GLUCOSE SERPL-MCNC: 81 MG/DL
HBA1C MFR BLD HPLC: 6.7 %
HCT VFR BLD CALC: 39.2 %
HDLC SERPL-MCNC: 71 MG/DL
HGB BLD-MCNC: 12.7 G/DL
IMM GRANULOCYTES NFR BLD AUTO: 0.3 %
LDLC SERPL CALC-MCNC: 63 MG/DL
LYMPHOCYTES # BLD AUTO: 0.83 K/UL
LYMPHOCYTES NFR BLD AUTO: 10.8 %
MAN DIFF?: NORMAL
MCHC RBC-ENTMCNC: 29.9 PG
MCHC RBC-ENTMCNC: 32.4 GM/DL
MCV RBC AUTO: 92.2 FL
MONOCYTES # BLD AUTO: 0.61 K/UL
MONOCYTES NFR BLD AUTO: 7.9 %
NEUTROPHILS # BLD AUTO: 5.62 K/UL
NEUTROPHILS NFR BLD AUTO: 72.8 %
NONHDLC SERPL-MCNC: 75 MG/DL
PLATELET # BLD AUTO: 252 K/UL
POTASSIUM SERPL-SCNC: 4.9 MMOL/L
PROT SERPL-MCNC: 6.8 G/DL
RBC # BLD: 4.25 M/UL
RBC # FLD: 15.1 %
SODIUM SERPL-SCNC: 143 MMOL/L
TRIGL SERPL-MCNC: 61 MG/DL
TSH SERPL-ACNC: 4 UIU/ML
URATE SERPL-MCNC: 2.6 MG/DL
WBC # FLD AUTO: 7.71 K/UL

## 2023-11-06 LAB
CREAT SPEC-SCNC: 155 MG/DL
MICROALBUMIN 24H UR DL<=1MG/L-MCNC: 2.8 MG/DL
MICROALBUMIN/CREAT 24H UR-RTO: 18 MG/G

## 2023-11-22 NOTE — ED ADULT NURSE NOTE - DISCHARGE DATE/TIME
15-Oct-2018 05:35 Protopic Pregnancy And Lactation Text: This medication is Pregnancy Category C. It is unknown if this medication is excreted in breast milk when applied topically.

## 2023-12-15 ENCOUNTER — RX RENEWAL (OUTPATIENT)
Age: 88
End: 2023-12-15

## 2023-12-28 ENCOUNTER — RX RENEWAL (OUTPATIENT)
Age: 88
End: 2023-12-28

## 2024-03-04 ENCOUNTER — RX RENEWAL (OUTPATIENT)
Age: 89
End: 2024-03-04

## 2024-03-14 ENCOUNTER — RX RENEWAL (OUTPATIENT)
Age: 89
End: 2024-03-14

## 2024-03-15 ENCOUNTER — APPOINTMENT (OUTPATIENT)
Dept: FAMILY MEDICINE | Facility: CLINIC | Age: 89
End: 2024-03-15
Payer: MEDICARE

## 2024-03-15 VITALS
RESPIRATION RATE: 16 BRPM | HEART RATE: 62 BPM | OXYGEN SATURATION: 96 % | WEIGHT: 161 LBS | HEIGHT: 63 IN | TEMPERATURE: 97.9 F | DIASTOLIC BLOOD PRESSURE: 67 MMHG | BODY MASS INDEX: 28.53 KG/M2 | SYSTOLIC BLOOD PRESSURE: 154 MMHG

## 2024-03-15 VITALS — SYSTOLIC BLOOD PRESSURE: 96 MMHG | DIASTOLIC BLOOD PRESSURE: 64 MMHG

## 2024-03-15 DIAGNOSIS — E78.00 PURE HYPERCHOLESTEROLEMIA, UNSPECIFIED: ICD-10-CM

## 2024-03-15 DIAGNOSIS — E55.9 VITAMIN D DEFICIENCY, UNSPECIFIED: ICD-10-CM

## 2024-03-15 DIAGNOSIS — E11.9 TYPE 2 DIABETES MELLITUS W/OUT COMPLICATIONS: ICD-10-CM

## 2024-03-15 DIAGNOSIS — M10.9 GOUT, UNSPECIFIED: ICD-10-CM

## 2024-03-15 DIAGNOSIS — E03.9 HYPOTHYROIDISM, UNSPECIFIED: ICD-10-CM

## 2024-03-15 PROCEDURE — 99214 OFFICE O/P EST MOD 30 MIN: CPT

## 2024-03-15 PROCEDURE — G2211 COMPLEX E/M VISIT ADD ON: CPT

## 2024-03-15 PROCEDURE — 36415 COLL VENOUS BLD VENIPUNCTURE: CPT

## 2024-03-15 NOTE — REVIEW OF SYSTEMS
[Negative] : ENT [Fever] : no fever [Chills] : no chills [Chest Pain] : no chest pain [Palpitations] : no palpitations [Shortness Of Breath] : no shortness of breath [Wheezing] : no wheezing [Diarrhea] : diarrhea [Dysuria] : no dysuria [Headache] : no headache [Dizziness] : no dizziness [FreeTextEntry7] : constipation-colace  [FreeTextEntry9] : leg discomfort [de-identified] : waking up to void 8 hours of sleep per night

## 2024-03-15 NOTE — PHYSICAL EXAM
[No Acute Distress] : no acute distress [Well-Appearing] : well-appearing [Well Developed] : well developed [Normal Oropharynx] : the oropharynx was normal [No Lymphadenopathy] : no lymphadenopathy [No Edema] : there was no peripheral edema [No Extremity Clubbing/Cyanosis] : no extremity clubbing/cyanosis [Normal] : affect was normal and insight and judgment were intact

## 2024-03-15 NOTE — HISTORY OF PRESENT ILLNESS
[Family Member] : family member [FreeTextEntry1] : Here for follow-up; needs med refills. [de-identified] : Here for follow-up; needs med refills. Here with her daughters.  Some decreased appetite.  Not eating much.  Ensure 2 a day at lunch.  Yogurt.   -Saw Eye doctor 1 month ago, every 6 months.    Podiatry every 2 months.    Sometimes skipping one dose of labetalol. Taking 10-14 units of insulin. Has been checking sugars.

## 2024-03-15 NOTE — PLAN
[FreeTextEntry1] : Discussed PT eval.  Patient declines at this time.  DM-A1c in control at 6.7 last visit-will recheck. Following with Podiatry and ophthalmology. Continue to monitor BP.  Will adjust meds based on labs.  Patient expressed understanding of plan.

## 2024-03-18 LAB
25(OH)D3 SERPL-MCNC: 54.2 NG/ML
ALBUMIN SERPL ELPH-MCNC: 4.5 G/DL
ALP BLD-CCNC: 77 U/L
ALT SERPL-CCNC: 16 U/L
ANION GAP SERPL CALC-SCNC: 14 MMOL/L
AST SERPL-CCNC: 19 U/L
BASOPHILS # BLD AUTO: 0.05 K/UL
BASOPHILS NFR BLD AUTO: 0.7 %
BILIRUB SERPL-MCNC: 0.7 MG/DL
BUN SERPL-MCNC: 32 MG/DL
CALCIUM SERPL-MCNC: 9.9 MG/DL
CHLORIDE SERPL-SCNC: 105 MMOL/L
CHOLEST SERPL-MCNC: 169 MG/DL
CO2 SERPL-SCNC: 26 MMOL/L
CREAT SERPL-MCNC: 1.65 MG/DL
EGFR: 28 ML/MIN/1.73M2
EOSINOPHIL # BLD AUTO: 0.41 K/UL
EOSINOPHIL NFR BLD AUTO: 5.8 %
ESTIMATED AVERAGE GLUCOSE: 128 MG/DL
GLUCOSE SERPL-MCNC: 105 MG/DL
HBA1C MFR BLD HPLC: 6.1 %
HCT VFR BLD CALC: 41.9 %
HDLC SERPL-MCNC: 77 MG/DL
HGB BLD-MCNC: 13.4 G/DL
IMM GRANULOCYTES NFR BLD AUTO: 0.3 %
LDLC SERPL CALC-MCNC: 79 MG/DL
LYMPHOCYTES # BLD AUTO: 0.86 K/UL
LYMPHOCYTES NFR BLD AUTO: 12.1 %
MAN DIFF?: NORMAL
MCHC RBC-ENTMCNC: 29.7 PG
MCHC RBC-ENTMCNC: 32 GM/DL
MCV RBC AUTO: 92.9 FL
MONOCYTES # BLD AUTO: 0.57 K/UL
MONOCYTES NFR BLD AUTO: 8 %
NEUTROPHILS # BLD AUTO: 5.22 K/UL
NEUTROPHILS NFR BLD AUTO: 73.1 %
NONHDLC SERPL-MCNC: 93 MG/DL
PLATELET # BLD AUTO: 225 K/UL
POTASSIUM SERPL-SCNC: 5.3 MMOL/L
PROT SERPL-MCNC: 6.9 G/DL
RBC # BLD: 4.51 M/UL
RBC # FLD: 15.9 %
SODIUM SERPL-SCNC: 144 MMOL/L
TRIGL SERPL-MCNC: 73 MG/DL
TSH SERPL-ACNC: 3.29 UIU/ML
URATE SERPL-MCNC: 2.4 MG/DL
WBC # FLD AUTO: 7.13 K/UL

## 2024-03-25 ENCOUNTER — RX RENEWAL (OUTPATIENT)
Age: 89
End: 2024-03-25

## 2024-04-04 ENCOUNTER — RX RENEWAL (OUTPATIENT)
Age: 89
End: 2024-04-04

## 2024-04-04 RX ORDER — PEN NEEDLE, DIABETIC 29 G X1/2"
31G X 8 MM NEEDLE, DISPOSABLE MISCELLANEOUS
Qty: 1 | Refills: 1 | Status: ACTIVE | COMMUNITY
Start: 1900-01-01 | End: 1900-01-01

## 2024-04-09 ENCOUNTER — RX RENEWAL (OUTPATIENT)
Age: 89
End: 2024-04-09

## 2024-04-09 RX ORDER — CLOPIDOGREL BISULFATE 75 MG/1
75 TABLET, FILM COATED ORAL DAILY
Qty: 90 | Refills: 1 | Status: ACTIVE | COMMUNITY
Start: 1900-01-01 | End: 1900-01-01

## 2024-04-12 ENCOUNTER — RX RENEWAL (OUTPATIENT)
Age: 89
End: 2024-04-12

## 2024-04-12 RX ORDER — LABETALOL HYDROCHLORIDE 200 MG/1
200 TABLET, FILM COATED ORAL
Qty: 180 | Refills: 0 | Status: ACTIVE | COMMUNITY
Start: 1900-01-01 | End: 1900-01-01

## 2024-04-17 NOTE — DISCHARGE NOTE PROVIDER - NSDCH[facility]_MEDRECSTATUS_GEN_ALL_CORE
Show Applicator Variable?: Yes Detail Level: Detailed Render Post-Care Instructions In Note?: no Post-Care Instructions: I reviewed with the patient in detail post-care instructions. Patient is to wear sunprotection, and avoid picking at any of the treated lesions. Pt may apply Vaseline to crusted or scabbing areas. Consent: The patient's consent was obtained including but not limited to risks of crusting, scabbing, blistering, scarring, darker or lighter pigmentary change, recurrence, incomplete removal and infection. Medical Necessity Clause: This procedure was medically necessary because the lesions that were treated were: Medical Necessity Information: It is in your best interest to select a reason for this procedure from the list below. All of these items fulfill various CMS LCD requirements except the new and changing color options. Spray Paint Text: The liquid nitrogen was applied to the skin utilizing a spray paint frosting technique. Admission Reconciliation is Completed  Discharge Reconciliation is Completed

## 2024-05-15 NOTE — PHYSICAL THERAPY INITIAL EVALUATION ADULT - ASR EQUIP NEEDS DISCH PT EVAL
[] [facility]  Outpatient Rehabilitation &  Therapy  [address]  [phone]  [phone] [] [facility]  Outpatient Rehabilitation &  Therapy  [address]  P: [phone]  F: [phone] [] [facility]  Outpatient Rehabilitation &  Therapy  [address]  P: [phone]  F: [phone] [] [facility]  Outpatient Rehabilitation &  Therapy  [address]  [phone]  [phone] [] [facility]  Outpatient Rehabilitation &  Therapy  [address]   P: [phone]  F: [phone]  [x] [facility]  Outpatient Rehabilitation &  Therapy  [address]  P: [phone]  F: [phone] [] [facility]  Outpatient Rehabilitation &  Therapy  [address]  P: [phone]  F: [phone] [] [facility]  Outpatient Rehabilitation &  Therapy  [address]  P: [phone]  F: [phone] [] [facility]  Outpatient Rehabilitation &  Therapy  [address]  P: [phone]  F: [phone]  [] [facility] Outpatient Rehabilitation &  Therapy  [address]  P: [phone]  F: [phone]     Physical Therapy Daily Treatment Note    Date:  5/15/2024  Patient Name:  Oumar Claire    :  2006  MRN: [id number]  Physician:    Aruna Harris MD                                         Insurance: [employer] (No auth, No co pay; 30 PT visits)  Medical Diagnosis: R27.0                  Rehab Codes: M62.81, Z91.81, R26.89, R26.81  Onset Date: 23                                  Next 's appt: 4/10/24  Visit# / total visits:     Cancels/No Shows: 1/0    Subjective:  Patient and his mother note today that patient has lost an additional 3# weight this past week. In addition, seems to be fatiguing more easily. By end 
TBD

## 2024-05-21 ENCOUNTER — RX RENEWAL (OUTPATIENT)
Age: 89
End: 2024-05-21

## 2024-05-21 RX ORDER — ATORVASTATIN CALCIUM 80 MG/1
80 TABLET, FILM COATED ORAL
Qty: 90 | Refills: 0 | Status: ACTIVE | COMMUNITY
Start: 2024-05-21 | End: 1900-01-01

## 2024-06-03 ENCOUNTER — RX RENEWAL (OUTPATIENT)
Age: 89
End: 2024-06-03

## 2024-06-03 RX ORDER — FUROSEMIDE 40 MG/1
40 TABLET ORAL
Qty: 90 | Refills: 0 | Status: ACTIVE | COMMUNITY
Start: 2022-12-15 | End: 1900-01-01

## 2024-06-04 RX ORDER — BLOOD SUGAR DIAGNOSTIC
STRIP MISCELLANEOUS
Qty: 300 | Refills: 0 | Status: ACTIVE | COMMUNITY
Start: 2024-06-04 | End: 1900-01-01

## 2024-06-04 RX ORDER — INSULIN GLARGINE 100 [IU]/ML
100 INJECTION, SOLUTION SUBCUTANEOUS
Qty: 1 | Refills: 2 | Status: ACTIVE | COMMUNITY
Start: 2019-01-13 | End: 1900-01-01

## 2024-06-10 ENCOUNTER — RX RENEWAL (OUTPATIENT)
Age: 89
End: 2024-06-10

## 2024-06-10 RX ORDER — ALLOPURINOL 300 MG/1
300 TABLET ORAL
Qty: 90 | Refills: 0 | Status: ACTIVE | COMMUNITY
Start: 2023-03-11 | End: 1900-01-01

## 2024-06-20 ENCOUNTER — RX RENEWAL (OUTPATIENT)
Age: 89
End: 2024-06-20

## 2024-06-20 RX ORDER — LEVOTHYROXINE SODIUM 0.07 MG/1
75 TABLET ORAL
Qty: 90 | Refills: 0 | Status: ACTIVE | COMMUNITY
Start: 1900-01-01 | End: 1900-01-01

## 2024-06-20 RX ORDER — AMLODIPINE BESYLATE 10 MG/1
10 TABLET ORAL
Qty: 90 | Refills: 0 | Status: ACTIVE | COMMUNITY
Start: 1900-01-01 | End: 1900-01-01

## 2024-07-03 ENCOUNTER — APPOINTMENT (OUTPATIENT)
Dept: FAMILY MEDICINE | Facility: CLINIC | Age: 89
End: 2024-07-03
Payer: MEDICARE

## 2024-07-03 DIAGNOSIS — K04.7 PERIAPICAL ABSCESS W/OUT SINUS: ICD-10-CM

## 2024-07-03 PROCEDURE — 99213 OFFICE O/P EST LOW 20 MIN: CPT

## 2024-07-17 ENCOUNTER — RX RENEWAL (OUTPATIENT)
Age: 89
End: 2024-07-17

## 2024-08-30 ENCOUNTER — RX RENEWAL (OUTPATIENT)
Age: 89
End: 2024-08-30

## 2024-09-11 ENCOUNTER — NON-APPOINTMENT (OUTPATIENT)
Age: 89
End: 2024-09-11

## 2024-09-11 ENCOUNTER — APPOINTMENT (OUTPATIENT)
Dept: FAMILY MEDICINE | Facility: CLINIC | Age: 89
End: 2024-09-11
Payer: MEDICARE

## 2024-09-11 VITALS
BODY MASS INDEX: 27.64 KG/M2 | DIASTOLIC BLOOD PRESSURE: 62 MMHG | SYSTOLIC BLOOD PRESSURE: 123 MMHG | WEIGHT: 156 LBS | TEMPERATURE: 97.2 F | HEART RATE: 75 BPM | HEIGHT: 63 IN | RESPIRATION RATE: 16 BRPM | OXYGEN SATURATION: 95 %

## 2024-09-11 DIAGNOSIS — M10.9 GOUT, UNSPECIFIED: ICD-10-CM

## 2024-09-11 DIAGNOSIS — E55.9 VITAMIN D DEFICIENCY, UNSPECIFIED: ICD-10-CM

## 2024-09-11 DIAGNOSIS — Z00.00 ENCOUNTER FOR GENERAL ADULT MEDICAL EXAMINATION W/OUT ABNORMAL FINDINGS: ICD-10-CM

## 2024-09-11 PROCEDURE — G0008: CPT

## 2024-09-11 PROCEDURE — 36415 COLL VENOUS BLD VENIPUNCTURE: CPT

## 2024-09-11 PROCEDURE — G0439: CPT

## 2024-09-11 PROCEDURE — 93000 ELECTROCARDIOGRAM COMPLETE: CPT

## 2024-09-11 PROCEDURE — 90662 IIV NO PRSV INCREASED AG IM: CPT

## 2024-09-11 NOTE — PHYSICAL EXAM
[No Acute Distress] : no acute distress [Well-Appearing] : well-appearing [Normal Outer Ear/Nose] : the outer ears and nose were normal in appearance [No Lymphadenopathy] : no lymphadenopathy [No Edema] : there was no peripheral edema [No Extremity Clubbing/Cyanosis] : no extremity clubbing/cyanosis [Normal] : affect was normal and insight and judgment were intact [de-identified] : impacted cerumen b/l

## 2024-09-11 NOTE — HISTORY OF PRESENT ILLNESS
[Family Member] : family member [FreeTextEntry1] : Ms. WALTERS presents for annual physical. [de-identified] : Ms. WALTERS presents for annual physical. Here with daughter, Luz.  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   Lantus 12 units  Vitamin B12   Saw optho saw podiatry 2 months ago. No further tooth pain-did not end up having dental extraction  Wants flu shot today.

## 2024-09-11 NOTE — PHYSICAL EXAM
[No Acute Distress] : no acute distress [Well-Appearing] : well-appearing [Normal Outer Ear/Nose] : the outer ears and nose were normal in appearance [No Lymphadenopathy] : no lymphadenopathy [No Edema] : there was no peripheral edema [No Extremity Clubbing/Cyanosis] : no extremity clubbing/cyanosis [Normal] : affect was normal and insight and judgment were intact [de-identified] : impacted cerumen b/l

## 2024-09-11 NOTE — PLAN
[FreeTextEntry1] : Received flu shot. Advised Ms. MARCE WALTERS they may experience some soreness, tenderness at the injection site.  Advised to call office if  not improving.  Ms. WALTERS expressed understanding.  Advised patient to start Debrox/Carbamide peroxide drops 3-4 days prior to returning to office for ear wax irrigation of impacted cerumen. Patient wants to hold off-notes her hearing is doing okay.  EKG reviewed-similar to prior-asymptomatic; no acute changes.  Gout-Check Uric Acid. Discussed reducing dose of allopurinol.   DM-check urine, A1c, CMP.  Saw ophthalmology and Podiatry. Will adjust meds based on labs.  Patient expressed understanding of plan.

## 2024-09-11 NOTE — REVIEW OF SYSTEMS
[Fever] : no fever [Chills] : no chills [Chest Pain] : no chest pain [Palpitations] : no palpitations [Dysuria] : no dysuria [Headache] : no headache [Dizziness] : no dizziness [Negative] : ENT [FreeTextEntry4] : no tooth pain [de-identified] : sleeping okay-wakes up to void

## 2024-09-11 NOTE — REVIEW OF SYSTEMS
[Fever] : no fever [Chills] : no chills [Chest Pain] : no chest pain [Palpitations] : no palpitations [Dysuria] : no dysuria [Headache] : no headache [Dizziness] : no dizziness [Negative] : ENT [FreeTextEntry4] : no tooth pain [de-identified] : sleeping okay-wakes up to void

## 2024-09-11 NOTE — HEALTH RISK ASSESSMENT
[Patient declined mammogram] : Patient declined mammogram [Patient declined PAP Smear] : Patient declined PAP Smear [Patient declined bone density test] : Patient declined bone density test [Patient declined colonoscopy] : Patient declined colonoscopy

## 2024-09-11 NOTE — HISTORY OF PRESENT ILLNESS
[Family Member] : family member [FreeTextEntry1] : Ms. WALTERS presents for annual physical. [de-identified] : Ms. WALTERS presents for annual physical. Here with daughter, Luz.  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   Lantus 12 units  Vitamin B12   Saw optho saw podiatry 2 months ago. No further tooth pain-did not end up having dental extraction  Wants flu shot today.

## 2024-09-13 DIAGNOSIS — E11.9 TYPE 2 DIABETES MELLITUS W/OUT COMPLICATIONS: ICD-10-CM

## 2024-09-13 LAB
25(OH)D3 SERPL-MCNC: 53 NG/ML
ALBUMIN SERPL ELPH-MCNC: 4.3 G/DL
ALP BLD-CCNC: 89 U/L
ALT SERPL-CCNC: 14 U/L
ANION GAP SERPL CALC-SCNC: 17 MMOL/L
AST SERPL-CCNC: 22 U/L
BASOPHILS # BLD AUTO: 0.05 K/UL
BASOPHILS NFR BLD AUTO: 0.7 %
BILIRUB SERPL-MCNC: 0.7 MG/DL
BUN SERPL-MCNC: 36 MG/DL
CALCIUM SERPL-MCNC: 9.6 MG/DL
CHLORIDE SERPL-SCNC: 100 MMOL/L
CHOLEST SERPL-MCNC: 149 MG/DL
CO2 SERPL-SCNC: 24 MMOL/L
CREAT SERPL-MCNC: 1.6 MG/DL
CREAT SPEC-SCNC: 159 MG/DL
EGFR: 29 ML/MIN/1.73M2
EOSINOPHIL # BLD AUTO: 0.33 K/UL
EOSINOPHIL NFR BLD AUTO: 4.8 %
ESTIMATED AVERAGE GLUCOSE: 128 MG/DL
GLUCOSE SERPL-MCNC: 118 MG/DL
HBA1C MFR BLD HPLC: 6.1 %
HCT VFR BLD CALC: 38.6 %
HDLC SERPL-MCNC: 80 MG/DL
HGB BLD-MCNC: 12.9 G/DL
IMM GRANULOCYTES NFR BLD AUTO: 0.1 %
LDLC SERPL CALC-MCNC: 60 MG/DL
LYMPHOCYTES # BLD AUTO: 0.98 K/UL
LYMPHOCYTES NFR BLD AUTO: 14.2 %
MAN DIFF?: NORMAL
MCHC RBC-ENTMCNC: 30.8 PG
MCHC RBC-ENTMCNC: 33.4 GM/DL
MCV RBC AUTO: 92.1 FL
MICROALBUMIN 24H UR DL<=1MG/L-MCNC: 13.7 MG/DL
MICROALBUMIN/CREAT 24H UR-RTO: 86 MG/G
MONOCYTES # BLD AUTO: 0.63 K/UL
MONOCYTES NFR BLD AUTO: 9.1 %
NEUTROPHILS # BLD AUTO: 4.9 K/UL
NEUTROPHILS NFR BLD AUTO: 71.1 %
NONHDLC SERPL-MCNC: 69 MG/DL
PLATELET # BLD AUTO: 247 K/UL
POTASSIUM SERPL-SCNC: 4.9 MMOL/L
PROT SERPL-MCNC: 6.6 G/DL
RBC # BLD: 4.19 M/UL
RBC # FLD: 15 %
SODIUM SERPL-SCNC: 141 MMOL/L
TRIGL SERPL-MCNC: 42 MG/DL
TSH SERPL-ACNC: 3.71 UIU/ML
URATE SERPL-MCNC: 2.7 MG/DL
WBC # FLD AUTO: 6.9 K/UL

## 2024-09-17 ENCOUNTER — RX RENEWAL (OUTPATIENT)
Age: 89
End: 2024-09-17

## 2024-09-30 ENCOUNTER — RX RENEWAL (OUTPATIENT)
Age: 89
End: 2024-09-30

## 2024-11-29 ENCOUNTER — EMERGENCY (EMERGENCY)
Facility: HOSPITAL | Age: 89
LOS: 0 days | Discharge: ANOTHER HOSPITAL | End: 2024-11-30
Attending: STUDENT IN AN ORGANIZED HEALTH CARE EDUCATION/TRAINING PROGRAM
Payer: MEDICARE

## 2024-11-29 VITALS
WEIGHT: 160.06 LBS | SYSTOLIC BLOOD PRESSURE: 103 MMHG | HEART RATE: 87 BPM | DIASTOLIC BLOOD PRESSURE: 46 MMHG | RESPIRATION RATE: 16 BRPM | HEIGHT: 63 IN | TEMPERATURE: 100 F | OXYGEN SATURATION: 96 %

## 2024-11-29 DIAGNOSIS — M16.11 UNILATERAL PRIMARY OSTEOARTHRITIS, RIGHT HIP: Chronic | ICD-10-CM

## 2024-11-29 DIAGNOSIS — I10 ESSENTIAL (PRIMARY) HYPERTENSION: ICD-10-CM

## 2024-11-29 DIAGNOSIS — K92.2 GASTROINTESTINAL HEMORRHAGE, UNSPECIFIED: ICD-10-CM

## 2024-11-29 DIAGNOSIS — K42.9 UMBILICAL HERNIA WITHOUT OBSTRUCTION OR GANGRENE: Chronic | ICD-10-CM

## 2024-11-29 DIAGNOSIS — Z86.73 PERSONAL HISTORY OF TRANSIENT ISCHEMIC ATTACK (TIA), AND CEREBRAL INFARCTION WITHOUT RESIDUAL DEFICITS: ICD-10-CM

## 2024-11-29 DIAGNOSIS — K62.5 HEMORRHAGE OF ANUS AND RECTUM: ICD-10-CM

## 2024-11-29 DIAGNOSIS — Z79.02 LONG TERM (CURRENT) USE OF ANTITHROMBOTICS/ANTIPLATELETS: ICD-10-CM

## 2024-11-29 DIAGNOSIS — E11.9 TYPE 2 DIABETES MELLITUS WITHOUT COMPLICATIONS: ICD-10-CM

## 2024-11-29 DIAGNOSIS — R60.0 LOCALIZED EDEMA: ICD-10-CM

## 2024-11-29 LAB
BASOPHILS # BLD AUTO: 0.05 K/UL — SIGNIFICANT CHANGE UP (ref 0–0.2)
BASOPHILS NFR BLD AUTO: 0.6 % — SIGNIFICANT CHANGE UP (ref 0–2)
EOSINOPHIL # BLD AUTO: 0.39 K/UL — SIGNIFICANT CHANGE UP (ref 0–0.5)
EOSINOPHIL NFR BLD AUTO: 5.1 % — SIGNIFICANT CHANGE UP (ref 0–6)
HCT VFR BLD CALC: 32.2 % — LOW (ref 34.5–45)
HGB BLD-MCNC: 10.8 G/DL — LOW (ref 11.5–15.5)
IMM GRANULOCYTES NFR BLD AUTO: 0.4 % — SIGNIFICANT CHANGE UP (ref 0–0.9)
LYMPHOCYTES # BLD AUTO: 1.01 K/UL — SIGNIFICANT CHANGE UP (ref 1–3.3)
LYMPHOCYTES # BLD AUTO: 13.1 % — SIGNIFICANT CHANGE UP (ref 13–44)
MCHC RBC-ENTMCNC: 30 PG — SIGNIFICANT CHANGE UP (ref 27–34)
MCHC RBC-ENTMCNC: 33.5 G/DL — SIGNIFICANT CHANGE UP (ref 32–36)
MCV RBC AUTO: 89.4 FL — SIGNIFICANT CHANGE UP (ref 80–100)
MONOCYTES # BLD AUTO: 0.64 K/UL — SIGNIFICANT CHANGE UP (ref 0–0.9)
MONOCYTES NFR BLD AUTO: 8.3 % — SIGNIFICANT CHANGE UP (ref 2–14)
NEUTROPHILS # BLD AUTO: 5.58 K/UL — SIGNIFICANT CHANGE UP (ref 1.8–7.4)
NEUTROPHILS NFR BLD AUTO: 72.5 % — SIGNIFICANT CHANGE UP (ref 43–77)
NRBC # BLD: 0 /100 WBCS — SIGNIFICANT CHANGE UP (ref 0–0)
PLATELET # BLD AUTO: 202 K/UL — SIGNIFICANT CHANGE UP (ref 150–400)
RBC # BLD: 3.6 M/UL — LOW (ref 3.8–5.2)
RBC # FLD: 14.9 % — HIGH (ref 10.3–14.5)
WBC # BLD: 7.7 K/UL — SIGNIFICANT CHANGE UP (ref 3.8–10.5)
WBC # FLD AUTO: 7.7 K/UL — SIGNIFICANT CHANGE UP (ref 3.8–10.5)

## 2024-11-29 PROCEDURE — 99291 CRITICAL CARE FIRST HOUR: CPT

## 2024-11-29 RX ORDER — SODIUM CHLORIDE 9 MG/ML
1000 INJECTION, SOLUTION INTRAMUSCULAR; INTRAVENOUS; SUBCUTANEOUS ONCE
Refills: 0 | Status: COMPLETED | OUTPATIENT
Start: 2024-11-29 | End: 2024-11-29

## 2024-11-29 RX ADMIN — SODIUM CHLORIDE 1000 MILLILITER(S): 9 INJECTION, SOLUTION INTRAMUSCULAR; INTRAVENOUS; SUBCUTANEOUS at 23:50

## 2024-11-29 NOTE — ED ADULT NURSE NOTE - CAS TRG GEN SKIN CONDITION
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   I had a discussion with the patient regarding today's visit, the prognosis of this diagnosis, and treatment recommendations and options. At this time, he opted to proceed with aspiration of his right middle finger MCP joint ganglion cyst and cortisone injection at his right middle finger trigger finger.  He does understand the high likelihood of recurrence of the right middle finger MCP joint ganglion cyst.  He will follow up according to symptoms.  The patient has agreed to the above plan of management and has expressed full understanding.  All questions were fully answered to the patient's satisfaction.   My cumulative time spent on this visit included: Preparation for the visit, review of the medical records, review of pertinent diagnostic studies, examination and counseling of the patient on the above diagnosis, treatment plan and prognosis, orders of diagnostic tests, medication and/or appropriate procedures and documentation in the medical records of today's visit.
Warm

## 2024-11-29 NOTE — ED PROVIDER NOTE - CRITICAL CARE ATTENDING CONTRIBUTION TO CARE
Upon my evaluation, this patient had a high probability of imminent or life-threatening deterioration due to active GI bleed, which required my direct attention, intervention, and personal management.  The patient has a  medical condition that impairs one or more vital organ systems.  Frequent personal assessment and adjustment of medical interventions was performed.      I have personally provided 60 minutes of critical care time exclusive of time spent on separately billable procedures. Time includes review of laboratory data, radiology results, discussion with consultants, patient and family; monitoring for potential decompensation, as well as time spent retrieving data and reviewing the chart and documenting the visit. Interventions were performed as documented above.

## 2024-11-29 NOTE — ED ADULT NURSE NOTE - IN THE PAST 12 MONTHS HAVE YOU USED DRUGS OTHER THAN THOSE REQUIRED FOR MEDICAL REASON?
[facility] - pt states that she has been having difficulty breathing that started a few days ago that pt states was her bronchitis that she was dx with years ago - pt moaning with loud inhalation/exhalation upon arrival - pt talking with full sentences upon arrival  Pt c/o tightness to chest.    
No

## 2024-11-29 NOTE — ED ADULT TRIAGE NOTE - CHIEF COMPLAINT QUOTE
Pt states blood is coming out of her rectum. States it's like water x 3 episode first starting at 9AM. Pt denies dizziness, weakness or lightheadedness. Pt takes Plavix daily for TIA x 6 years ago.

## 2024-11-29 NOTE — H&P ADULT. - VENOUS THROMBOEMBOLISM
----- Message from Kt Lux MD sent at 11/29/2024  1:04 PM CST -----  Pls tell Potassium is nml. Will continue at 20 meq twice a day. Script sent to [facility]. will recheck potassium level in 1 week. Please help pt schedule.    no

## 2024-11-29 NOTE — ED PROVIDER NOTE - PHYSICAL EXAMINATION
General: Well appearing female in no acute distress  HEENT: Normocephalic, atraumatic. Moist mucous membranes. Oropharynx clear. No lymphadenopathy.  Eyes: No scleral icterus. EOMI. BACILIO.  Neck:. Soft and supple. Full ROM without pain. No midline tenderness  Cardiac: Regular rate and regular rhythm. No murmurs, rubs, gallops. Peripheral pulses 2+ and symmetric. No LE edema.  Resp: Lungs CTAB. Speaking in full sentences. No wheezes, rales or rhonchi.  Abd: Soft, non-tender, non-distended. No guarding or rebound. No scars, masses, or lesions.  Back: Spine midline and non-tender. No CVA tenderness.    Skin: No rashes, abrasions, or lacerations.  Neuro: AO x 3. Moves all extremities symmetrically. Motor strength and sensation grossly intact.  MADAY chaperoned by SHERI Titus nnamdi - red color blood on stool, no active bleeding. no external hemorrhoids/ no fissure.

## 2024-11-29 NOTE — ED ADULT TRIAGE NOTE - NSWEIGHTCALCTOOLDRUG_GEN_A_CORE
Warfarin/Coumadin increases your risk for bleeding. Notify your doctor if you see any bleeding or any of the side effects listed in the Warfarin/Coumadin Booklet. Diet and medications can affect the PT/INR blood level. When Warfarin/Coumadin is taken with other medicines it can change the way other medicines work. Other medicines can also change the way Warfarin/Coumadin works. It is very important to tell your health care provider about all other medicines, including over-the-counter medications, herbs, diet supplements, or products containing vitamin K. Call your doctor before starting, stopping, or changing the dose of any prescription or over-the-counter medications. Any product containing aspirin lessens the blood's ability to form clots and adds to the effect of Warfarin/Coumadin. Never take aspirin without speaking with your health care provider.
 used

## 2024-11-29 NOTE — ED PROVIDER NOTE - CLINICAL SUMMARY MEDICAL DECISION MAKING FREE TEXT BOX
89 y/o F hx of TIA on plavix, HTN, DM, LE edema on lasix presents for 1 day of rectal bleeding. states she had 3 watery episodes - red in color. unknown last colonoscopy but state sit was many years. denies rectal pain or abdominal pain. denies fever/chills. denies chest pain/sob.   MADAY chaperoned by SHERI nnamdi - red color blood on glove, no active bleeding.  CTA   check labs  well appearing  benign abdomen. 89 y/o F hx of TIA on plavix, HTN, DM, LE edema on lasix presents for 1 day of rectal bleeding. states she had 3 watery episodes - red in color. unknown last colonoscopy but state sit was many years. denies rectal pain or abdominal pain. denies fever/chills. denies chest pain/sob.   MADAY chaperoned by RN nnamdi - red color blood on glove, no active bleeding.  CTA   check labs  well appearing  benign abdomen.  PROGRESS NOTE MD Gary**: 2 additional bloody bowel movements in the ER , hemodynamically stable, denies any pain . unit prbc ordered. patient and daughter updated, spoke to transfer center dr. bowen who accepted patient for IR evaluation for possible intervention if needed upon evaluation - there is NO IR at this facility until 12/2/24. hemoglobin 10.8 , hemoglobin baseline mid 12-13 per HIE review so hemoglobin is lower than baseline. active GI bleed. last took plavix earlier today.

## 2024-11-29 NOTE — ED ADULT NURSE NOTE - OBJECTIVE STATEMENT
Patient states to have sleep apnea and use cpap machine at home. Educated patient about respiratory therapy and the option to use home cpap or hospital cpap. Patient denied both options and requested to use nasal cannula when sleeping. Patient and family at bedside verbalized understanding of education.    Pt is a 89y F AOx4 with a pmh of HTN, DM and TIA on plavix. Pt reports 3 episodes of bloody stool starting at 9 am. Pt states it just comes out of her rectum on its own. Pt denies n/v/d, weakness, lightheadedness, dizziness or vision changes.

## 2024-11-29 NOTE — ED ADULT NURSE NOTE - NSFALLRISKINTERV_ED_ALL_ED

## 2024-11-29 NOTE — ED PROVIDER NOTE - OBJECTIVE STATEMENT
89 y/o F hx of TIA on plavix, HTN, DM, LE edema on lasix presents for 1 day of rectal bleeding. states she had 3 watery episodes - red in color. unknown last colonoscopy but state sit was many years. denies rectal pain or abdominal pain. denies fever/chills. denies chest pain/sob.

## 2024-11-30 ENCOUNTER — INPATIENT (INPATIENT)
Facility: HOSPITAL | Age: 88
LOS: 3 days | Discharge: HOME | DRG: 379 | End: 2024-12-04
Attending: HOSPITALIST | Admitting: STUDENT IN AN ORGANIZED HEALTH CARE EDUCATION/TRAINING PROGRAM
Payer: MEDICARE

## 2024-11-30 VITALS
SYSTOLIC BLOOD PRESSURE: 126 MMHG | RESPIRATION RATE: 20 BRPM | DIASTOLIC BLOOD PRESSURE: 59 MMHG | HEART RATE: 88 BPM | HEIGHT: 63 IN | OXYGEN SATURATION: 96 % | TEMPERATURE: 98 F

## 2024-11-30 VITALS
HEART RATE: 90 BPM | SYSTOLIC BLOOD PRESSURE: 154 MMHG | OXYGEN SATURATION: 97 % | RESPIRATION RATE: 17 BRPM | DIASTOLIC BLOOD PRESSURE: 61 MMHG | TEMPERATURE: 99 F

## 2024-11-30 DIAGNOSIS — K92.2 GASTROINTESTINAL HEMORRHAGE, UNSPECIFIED: ICD-10-CM

## 2024-11-30 DIAGNOSIS — M10.9 GOUT, UNSPECIFIED: ICD-10-CM

## 2024-11-30 DIAGNOSIS — D62 ACUTE POSTHEMORRHAGIC ANEMIA: ICD-10-CM

## 2024-11-30 DIAGNOSIS — N18.30 CHRONIC KIDNEY DISEASE, STAGE 3 UNSPECIFIED: ICD-10-CM

## 2024-11-30 DIAGNOSIS — E03.9 HYPOTHYROIDISM, UNSPECIFIED: ICD-10-CM

## 2024-11-30 DIAGNOSIS — I10 ESSENTIAL (PRIMARY) HYPERTENSION: ICD-10-CM

## 2024-11-30 DIAGNOSIS — K42.9 UMBILICAL HERNIA WITHOUT OBSTRUCTION OR GANGRENE: Chronic | ICD-10-CM

## 2024-11-30 DIAGNOSIS — E78.5 HYPERLIPIDEMIA, UNSPECIFIED: ICD-10-CM

## 2024-11-30 DIAGNOSIS — I65.29 OCCLUSION AND STENOSIS OF UNSPECIFIED CAROTID ARTERY: ICD-10-CM

## 2024-11-30 DIAGNOSIS — Z86.73 PERSONAL HISTORY OF TRANSIENT ISCHEMIC ATTACK (TIA), AND CEREBRAL INFARCTION WITHOUT RESIDUAL DEFICITS: ICD-10-CM

## 2024-11-30 DIAGNOSIS — E11.9 TYPE 2 DIABETES MELLITUS WITHOUT COMPLICATIONS: ICD-10-CM

## 2024-11-30 DIAGNOSIS — M16.11 UNILATERAL PRIMARY OSTEOARTHRITIS, RIGHT HIP: Chronic | ICD-10-CM

## 2024-11-30 DIAGNOSIS — Z29.9 ENCOUNTER FOR PROPHYLACTIC MEASURES, UNSPECIFIED: ICD-10-CM

## 2024-11-30 LAB
A1C WITH ESTIMATED AVERAGE GLUCOSE RESULT: 5.8 % — HIGH (ref 4–5.6)
ALBUMIN SERPL ELPH-MCNC: 3.1 G/DL — LOW (ref 3.3–5)
ALBUMIN SERPL ELPH-MCNC: 3.2 G/DL — LOW (ref 3.3–5)
ALP SERPL-CCNC: 63 U/L — SIGNIFICANT CHANGE UP (ref 40–120)
ALP SERPL-CCNC: 66 U/L — SIGNIFICANT CHANGE UP (ref 40–120)
ALT FLD-CCNC: 17 U/L — SIGNIFICANT CHANGE UP (ref 10–45)
ALT FLD-CCNC: 26 U/L — SIGNIFICANT CHANGE UP (ref 12–78)
ANION GAP SERPL CALC-SCNC: 12 MMOL/L — SIGNIFICANT CHANGE UP (ref 5–17)
ANION GAP SERPL CALC-SCNC: 7 MMOL/L — SIGNIFICANT CHANGE UP (ref 5–17)
APTT BLD: 29.2 SEC — SIGNIFICANT CHANGE UP (ref 24.5–35.6)
APTT BLD: 29.4 SEC — SIGNIFICANT CHANGE UP (ref 24.5–35.6)
AST SERPL-CCNC: 18 U/L — SIGNIFICANT CHANGE UP (ref 10–40)
AST SERPL-CCNC: 22 U/L — SIGNIFICANT CHANGE UP (ref 15–37)
BASOPHILS # BLD AUTO: 0.03 K/UL — SIGNIFICANT CHANGE UP (ref 0–0.2)
BASOPHILS NFR BLD AUTO: 0.3 % — SIGNIFICANT CHANGE UP (ref 0–2)
BILIRUB SERPL-MCNC: 0.9 MG/DL — SIGNIFICANT CHANGE UP (ref 0.2–1.2)
BILIRUB SERPL-MCNC: 0.9 MG/DL — SIGNIFICANT CHANGE UP (ref 0.2–1.2)
BLD GP AB SCN SERPL QL: NEGATIVE — SIGNIFICANT CHANGE UP
BUN SERPL-MCNC: 30 MG/DL — HIGH (ref 7–23)
BUN SERPL-MCNC: 34 MG/DL — HIGH (ref 7–23)
CALCIUM SERPL-MCNC: 8.2 MG/DL — LOW (ref 8.4–10.5)
CALCIUM SERPL-MCNC: 8.5 MG/DL — SIGNIFICANT CHANGE UP (ref 8.5–10.1)
CHLORIDE SERPL-SCNC: 107 MMOL/L — SIGNIFICANT CHANGE UP (ref 96–108)
CHLORIDE SERPL-SCNC: 109 MMOL/L — HIGH (ref 96–108)
CO2 SERPL-SCNC: 21 MMOL/L — LOW (ref 22–31)
CO2 SERPL-SCNC: 25 MMOL/L — SIGNIFICANT CHANGE UP (ref 22–31)
CREAT SERPL-MCNC: 1.5 MG/DL — HIGH (ref 0.5–1.3)
CREAT SERPL-MCNC: 1.58 MG/DL — HIGH (ref 0.5–1.3)
EGFR: 29 ML/MIN/1.73M2 — LOW
EGFR: 31 ML/MIN/1.73M2 — LOW
EOSINOPHIL # BLD AUTO: 0.2 K/UL — SIGNIFICANT CHANGE UP (ref 0–0.5)
EOSINOPHIL NFR BLD AUTO: 2.2 % — SIGNIFICANT CHANGE UP (ref 0–6)
ESTIMATED AVERAGE GLUCOSE: 120 MG/DL — HIGH (ref 68–114)
GAS PNL BLDV: SIGNIFICANT CHANGE UP
GLUCOSE BLDC GLUCOMTR-MCNC: 108 MG/DL — HIGH (ref 70–99)
GLUCOSE SERPL-MCNC: 152 MG/DL — HIGH (ref 70–99)
GLUCOSE SERPL-MCNC: 166 MG/DL — HIGH (ref 70–99)
HCT VFR BLD CALC: 24.3 % — LOW (ref 34.5–45)
HCT VFR BLD CALC: 26.9 % — LOW (ref 34.5–45)
HCT VFR BLD CALC: 31.7 % — LOW (ref 34.5–45)
HGB BLD-MCNC: 10.4 G/DL — LOW (ref 11.5–15.5)
HGB BLD-MCNC: 8 G/DL — LOW (ref 11.5–15.5)
HGB BLD-MCNC: 9.2 G/DL — LOW (ref 11.5–15.5)
IMM GRANULOCYTES NFR BLD AUTO: 0.4 % — SIGNIFICANT CHANGE UP (ref 0–0.9)
INR BLD: 0.92 RATIO — SIGNIFICANT CHANGE UP (ref 0.85–1.16)
INR BLD: 0.96 RATIO — SIGNIFICANT CHANGE UP (ref 0.85–1.16)
LIDOCAIN IGE QN: 22 U/L — SIGNIFICANT CHANGE UP (ref 13–75)
LYMPHOCYTES # BLD AUTO: 0.9 K/UL — LOW (ref 1–3.3)
LYMPHOCYTES # BLD AUTO: 10.1 % — LOW (ref 13–44)
MCHC RBC-ENTMCNC: 29.5 PG — SIGNIFICANT CHANGE UP (ref 27–34)
MCHC RBC-ENTMCNC: 30 PG — SIGNIFICANT CHANGE UP (ref 27–34)
MCHC RBC-ENTMCNC: 31 PG — SIGNIFICANT CHANGE UP (ref 27–34)
MCHC RBC-ENTMCNC: 32.8 G/DL — SIGNIFICANT CHANGE UP (ref 32–36)
MCHC RBC-ENTMCNC: 32.9 G/DL — SIGNIFICANT CHANGE UP (ref 32–36)
MCHC RBC-ENTMCNC: 34.2 G/DL — SIGNIFICANT CHANGE UP (ref 32–36)
MCV RBC AUTO: 89.7 FL — SIGNIFICANT CHANGE UP (ref 80–100)
MCV RBC AUTO: 90.6 FL — SIGNIFICANT CHANGE UP (ref 80–100)
MCV RBC AUTO: 91.4 FL — SIGNIFICANT CHANGE UP (ref 80–100)
MONOCYTES # BLD AUTO: 0.72 K/UL — SIGNIFICANT CHANGE UP (ref 0–0.9)
MONOCYTES NFR BLD AUTO: 8.1 % — SIGNIFICANT CHANGE UP (ref 2–14)
NEUTROPHILS # BLD AUTO: 7.05 K/UL — SIGNIFICANT CHANGE UP (ref 1.8–7.4)
NEUTROPHILS NFR BLD AUTO: 78.9 % — HIGH (ref 43–77)
NRBC # BLD: 0 /100 WBCS — SIGNIFICANT CHANGE UP (ref 0–0)
PLATELET # BLD AUTO: 147 K/UL — LOW (ref 150–400)
PLATELET # BLD AUTO: 149 K/UL — LOW (ref 150–400)
PLATELET # BLD AUTO: 179 K/UL — SIGNIFICANT CHANGE UP (ref 150–400)
POTASSIUM SERPL-MCNC: 4.1 MMOL/L — SIGNIFICANT CHANGE UP (ref 3.5–5.3)
POTASSIUM SERPL-MCNC: 4.1 MMOL/L — SIGNIFICANT CHANGE UP (ref 3.5–5.3)
POTASSIUM SERPL-SCNC: 4.1 MMOL/L — SIGNIFICANT CHANGE UP (ref 3.5–5.3)
POTASSIUM SERPL-SCNC: 4.1 MMOL/L — SIGNIFICANT CHANGE UP (ref 3.5–5.3)
PROT SERPL-MCNC: 5.6 G/DL — LOW (ref 6–8.3)
PROT SERPL-MCNC: 5.9 GM/DL — LOW (ref 6–8.3)
PROTHROM AB SERPL-ACNC: 10.7 SEC — SIGNIFICANT CHANGE UP (ref 9.9–13.4)
PROTHROM AB SERPL-ACNC: 11 SEC — SIGNIFICANT CHANGE UP (ref 9.9–13.4)
RBC # BLD: 2.71 M/UL — LOW (ref 3.8–5.2)
RBC # BLD: 2.97 M/UL — LOW (ref 3.8–5.2)
RBC # BLD: 3.47 M/UL — LOW (ref 3.8–5.2)
RBC # FLD: 14.8 % — HIGH (ref 10.3–14.5)
RBC # FLD: 15.4 % — HIGH (ref 10.3–14.5)
RBC # FLD: 15.8 % — HIGH (ref 10.3–14.5)
RH IG SCN BLD-IMP: POSITIVE — SIGNIFICANT CHANGE UP
SODIUM SERPL-SCNC: 140 MMOL/L — SIGNIFICANT CHANGE UP (ref 135–145)
SODIUM SERPL-SCNC: 141 MMOL/L — SIGNIFICANT CHANGE UP (ref 135–145)
WBC # BLD: 7.31 K/UL — SIGNIFICANT CHANGE UP (ref 3.8–10.5)
WBC # BLD: 7.76 K/UL — SIGNIFICANT CHANGE UP (ref 3.8–10.5)
WBC # BLD: 8.94 K/UL — SIGNIFICANT CHANGE UP (ref 3.8–10.5)
WBC # FLD AUTO: 7.31 K/UL — SIGNIFICANT CHANGE UP (ref 3.8–10.5)
WBC # FLD AUTO: 7.76 K/UL — SIGNIFICANT CHANGE UP (ref 3.8–10.5)
WBC # FLD AUTO: 8.94 K/UL — SIGNIFICANT CHANGE UP (ref 3.8–10.5)

## 2024-11-30 PROCEDURE — 99223 1ST HOSP IP/OBS HIGH 75: CPT | Mod: GC

## 2024-11-30 PROCEDURE — 99222 1ST HOSP IP/OBS MODERATE 55: CPT | Mod: GC

## 2024-11-30 PROCEDURE — 74174 CTA ABD&PLVS W/CONTRAST: CPT | Mod: 26,MC

## 2024-11-30 PROCEDURE — 99285 EMERGENCY DEPT VISIT HI MDM: CPT

## 2024-11-30 RX ORDER — LEVOTHYROXINE SODIUM 150 MCG
75 TABLET ORAL DAILY
Refills: 0 | Status: DISCONTINUED | OUTPATIENT
Start: 2024-11-30 | End: 2024-12-04

## 2024-11-30 RX ORDER — INSULIN REG, HUM S-S BUFF 100/ML
VIAL (ML) INJECTION EVERY 6 HOURS
Refills: 0 | Status: DISCONTINUED | OUTPATIENT
Start: 2024-11-30 | End: 2024-12-01

## 2024-11-30 RX ORDER — ACETAMINOPHEN 500MG 500 MG/1
650 TABLET, COATED ORAL EVERY 6 HOURS
Refills: 0 | Status: DISCONTINUED | OUTPATIENT
Start: 2024-11-30 | End: 2024-12-04

## 2024-11-30 RX ORDER — 0.9 % SODIUM CHLORIDE 0.9 %
1000 INTRAVENOUS SOLUTION INTRAVENOUS
Refills: 0 | Status: DISCONTINUED | OUTPATIENT
Start: 2024-11-30 | End: 2024-12-04

## 2024-11-30 RX ORDER — ALLOPURINOL 300 MG/1
300 TABLET ORAL
Refills: 0 | Status: DISCONTINUED | OUTPATIENT
Start: 2024-11-30 | End: 2024-12-04

## 2024-11-30 RX ORDER — GLUCAGON INJECTION, SOLUTION 0.5 MG/.1ML
1 INJECTION, SOLUTION SUBCUTANEOUS ONCE
Refills: 0 | Status: DISCONTINUED | OUTPATIENT
Start: 2024-11-30 | End: 2024-12-04

## 2024-11-30 RX ORDER — 0.9 % SODIUM CHLORIDE 0.9 %
1000 INTRAVENOUS SOLUTION INTRAVENOUS
Refills: 0 | Status: DISCONTINUED | OUTPATIENT
Start: 2024-11-30 | End: 2024-12-01

## 2024-11-30 RX ADMIN — ALLOPURINOL 300 MILLIGRAM(S): 300 TABLET ORAL at 22:01

## 2024-11-30 RX ADMIN — Medication 80 MILLIGRAM(S): at 22:01

## 2024-11-30 RX ADMIN — Medication 50 MILLILITER(S): at 12:54

## 2024-11-30 NOTE — H&P ADULT - PROBLEM SELECTOR PLAN 2
Iso of LGIB, Hb on arrival to [facility] 10.4   Continues to have active hematochezia   s/p 1u pRBC at OSH     - Trend and monitor CBC q8h   - f/u repeat CBC   - maintain active type & screen  -Transfuse for Hb <7 or if patient is actively bleeding and symptomatic  - Plan for flex sig w/ GI

## 2024-11-30 NOTE — ED PROVIDER NOTE - NSICDXPASTSURGICALHX_GEN_ALL_CORE_FT
PAST SURGICAL HISTORY:  Osteoarthritis of right hip, unspecified osteoarthritis type partial Right Hip Replacement 2017    Paraumbilical hernia     S/P Tonsillectomy and Adenoidectomy     S/P Tonsillectomy and Adenoidectomy

## 2024-11-30 NOTE — H&P ADULT - NSHPPHYSICALEXAM_GEN_ALL_CORE
VITALS:   T(C): 36.7 (11-30-24 @ 08:55), Max: 37.6 (11-29-24 @ 22:27)  HR: 94 (11-30-24 @ 08:55) (83 - 94)  BP: 148/76 (11-30-24 @ 08:55) (103/46 - 157/65)  RR: 18 (11-30-24 @ 08:55) (15 - 20)  SpO2: 94% (11-30-24 @ 08:55) (94% - 98%)    GENERAL: NAD, lying in bed comfortably  HEAD:  Atraumatic, normocephalic  EYES: EOMI, PERRLA, conjunctiva and sclera clear  ENT: Moist mucous membranes  NECK: Supple, no JVD  HEART: Regular rate and rhythm, no murmurs, rubs, or gallops  LUNGS: Unlabored respirations.  Clear to auscultation bilaterally, no crackles, wheezing, or rhonchi  ABDOMEN: Soft, nontender, nondistended, +BS  EXTREMITIES: 2+ peripheral pulses bilaterally. No clubbing, cyanosis, LLE 1+ edema   NERVOUS SYSTEM:  A&Ox3, no focal deficits   SKIN: No rashes or lesions, some bruising noted on left arm

## 2024-11-30 NOTE — H&P ADULT - PROBLEM SELECTOR PLAN 3
Hx of b/l carotid artery stenosis   Shared decision making previously to medically manage w/ Plavix instead of surgical interventions due to her age     - Hold Plavix iso LGIB   - c/w atorvastatin 80mg qd

## 2024-11-30 NOTE — H&P ADULT - NSHPREVIEWOFSYSTEMS_GEN_ALL_CORE
REVIEW OF SYSTEMS:    CONSTITUTIONAL:  No weakness, fevers, or chills  EYES/ENT:  No visual changes, vertigo, or throat pain   NECK:  No pain or stiffness  RESPIRATORY:  No SOB, cough, wheezing, or hemoptysis  CARDIOVASCULAR:  No chest pain or palpitations  GASTROINTESTINAL:  No abdominal pain, nausea, vomiting, or hematemesis; No diarrhea or constipation; No melena , [+] BRBPR   GENITOURINARY:  No dysuria, change in frequency, or hematuria  NEUROLOGICAL:  No numbness or weakness  SKIN:  No itching or rashes REVIEW OF SYSTEMS:    CONSTITUTIONAL:  No weakness, fevers, or chills  EYES/ENT:  No visual changes, vertigo, or throat pain   NECK:  No pain or stiffness  RESPIRATORY:  No SOB, cough, wheezing, or hemoptysis  CARDIOVASCULAR:  No chest pain or palpitations  GASTROINTESTINAL:  No abdominal pain, nausea, vomiting, or hematemesis; No diarrhea or constipation; No melena , [+] hematochezia  GENITOURINARY:  No dysuria, change in frequency, or hematuria  NEUROLOGICAL:  No numbness or weakness  SKIN:  No itching or rashes

## 2024-11-30 NOTE — H&P ADULT - PROBLEM/PLAN-5
[FreeTextEntry1] : Catheter was removed without incident . Balloon deflated and intact on removal . Patient left the office and will to traveling to [place] . DISPLAY PLAN FREE TEXT

## 2024-11-30 NOTE — H&P ADULT - NSICDXPASTMEDICALHX_GEN_ALL_CORE_FT
PAST MEDICAL HISTORY:  Carotid artery stenosis     Chronic kidney disease, unspecified CKD stage     Diabetes     Gastroesophageal reflux disease, esophagitis presence not specified     Gout     Hyperlipidemia, unspecified hyperlipidemia type     Hypertension     Hypothyroidism, unspecified type     Transient ischemic attack

## 2024-11-30 NOTE — H&P ADULT - PROBLEM SELECTOR PLAN 5
On home Lantus Solostar 10u qd     - Low dose ISS no chest pain/no palpitations/no dyspnea on exertion/no orthopnea/no paroxysmal nocturnal dyspnea

## 2024-11-30 NOTE — ED ADULT NURSE NOTE - CADM POA PRESS ULCER
Epidural Block  Performed by: STEVEN JOHNSON  Authorized by: STEVEN JOHNSON     Patient Location:  OB  Start Time:  6/24/2019 4:05 PM  End Time:  6/24/2019 4:08 PM  Reason for Block: labor analgesia    patient identified, IV checked, site marked, risks and benefits discussed, surgical consent, monitors and equipment checked, pre-op evaluation and timeout performed    Patient Position:  Sitting  Prep: ChloraPrep, patient draped and sterile technique    Monitoring:  Blood pressure, continuous pulse oximetry and heart rate  Approach:  Midline  Location:  L3-L4  Injection Technique:  NESHA saline  Skin infiltration:  Lidocaine  Strength:  1%  Dose:  3ml  Needle Type:  Tuohy  Needle Gauge:  17 G  Needle Length:  3.5 in  Loss of resistance::  3  Catheter Size:  19 G  Catheter at Skin Depth:  3  Test Dose:  Lidocaine 1.5% with epinephrine 1-to-200,000  Test Dose Result:  Negative         No

## 2024-11-30 NOTE — ED PROVIDER NOTE - PROGRESS NOTE DETAILS
Discussed with IR overnight resident, recommending continued observation/reassessment after blood, follow up of labs, correction of coagulopathy if present, re-page if becomes hemodynamically unstable, GI consult. -Iwona Bennett MD (Attending) MD Margaret (PGY-3) patient's labs reviewed.  Patient H&H grossly stable compared to prior.  Patient to be admitted for further workup of her GI bleed.  GI emailed.

## 2024-11-30 NOTE — H&P ADULT - ASSESSMENT
89F w/ PMH of TIAs iso carotid artery stenosis on Plavix, T2DM, GERD, hypothyroidism and HTN presented w/ 2 days of BRBPR.

## 2024-11-30 NOTE — H&P ADULT - PROBLEM SELECTOR PLAN 4
TIA likely iso carotid artery stenosis 6 years ago   Being medically managed w/ Plavix (currently held) at home

## 2024-11-30 NOTE — H&P ADULT - NSICDXFAMILYHX_GEN_ALL_CORE_FT
FAMILY HISTORY:  Father  Still living? No  Family history of gastric cancer, Age at diagnosis: Age Unknown    Sibling  Still living? No  Family history of pancreatic cancer, Age at diagnosis: Age Unknown  Family hx of colorectal cancer, Age at diagnosis: Age Unknown

## 2024-11-30 NOTE — H&P ADULT - PROBLEM SELECTOR PLAN 1
CTA Abdomen/Pelvis w/ IV contrast showing area of active extravasation within the proximal sigmoid colon.  Hb on arrival to [facility] 10.4   Last colon >10 years ago    - GI consulted- planning for flex sig  - IR consulted- medical management for now   - Keep NPO for procedure   - IVF at 50cc/hr while NPO  - Transfuse for Hb <7 or if patient is actively bleeding and symptomatic

## 2024-11-30 NOTE — ED ADULT NURSE NOTE - OBJECTIVE STATEMENT
89 y.o F [facility] as transfer from [facility] p/w c/o rectal bleed. A+OX4. Pt states yesterday morning woke up to use bathroom, and noted watery bowel movement. Reports checked toilet and noticed laura red blood, states had x2-3 episodes of bloody diarrhea since initial yesterday morning, on Plavix for hx of TIA. CT scan done at [facility] showing active extravasation of sigmoid colon. Transferred to [facility] fro IR consult. 20g L forearm placed at [facility] 500 cc NS, and 1U PRBC given. Last dose of Plavix taken was this morning. Has had colonoscopy in past, pt states "years ago" unsure exact time. Denies any abd pain, CP, SOB, lightheadedness/ dizziness, headache, weakness/ fatigue, urinary sx. NIH 0. PMH DM, HLD, HTN, gerd, gout. NKA. No other complaints at this time, comfort and safety maintained.

## 2024-11-30 NOTE — H&P ADULT - ATTENDING COMMENTS
Morena Zuniga is a 89 y/o F with PMH significant for TIA suspected 2/2 SHAREE on medical management with plavix, IDDM2, HTN, HLD, CKD 3, hypothyroidism, GERD, gout, and LE edema presenting with one day of PRBPR. Initially presented to [facility] where CTA abdomen was positive for proximal sigmoid bleed. She was transferred to [facility] for IR evaluation.    #LGIB  #Acute blood loss anemia  - Hgb baseline 12 down to 10.8 at OSH. S/p 1u PRBC with repeat here 10.4  - Repeat CBC pending. Continues to have active hematochezia  - Seen by IR: no IR intervention at this time  - Seen by GI: Plan for Flex sig, tentatively today   - Continue NPO, start gentle IVF  - Hold Plavix and DVT PPX  #SHAREE  #TIA  - History of TIA suspected etiology SHAREE. At that time deemed too high surgical risk and decision was made to treat SHAREE medically with plavix and statin.  - Holding plavix for GIB  - Continue high-intensity statin  #HTN  - Holding in the setting of normotension/active bleed.   #IDDM2  - SSI q6h while NPO    - Reviewing, and interpreting labs and testing.  - Independently obtaining a review of systems and performing a physical exam  - Reviewing consultant documentation/recommendations in addition to discussing plan of care with consultants.  - Counselling and educating patient and family regarding interpretation of aforementioned items and plan of care.  - This excludes time spent teaching residents/medical students    Time Spent: 88 minutes

## 2024-11-30 NOTE — H&P ADULT - HISTORY OF PRESENT ILLNESS
89F w/ PMH of x TIAs on Plavix, T2DM, GERD, hypothyroidism and HTN initially presented w/ 3 days of painless BRBPR. 89F w/ PMH of x TIAs on Plavix, T2DM, GERD, hypothyroidism and HTN presented w/ 2 days of BRBPR. She said that she woke up the day after thanksgiving and used the restroom to urinate and saw a lot of blood in the toilet bowl. She has had more than 4 episodes since including one as the patient was being interviewed. She did not endorse any pain with the bowel movement or bleeding. Denies any current chest pain, SOB, headaches, dizziness, palpitations, lightheadedness, visual disturbances, n/v, diarrhea, constipation, abdominal pain, melena, dysuria. She initially presented w/ these symptoms to [facility] where she was found to have active extravasation at the proximal sigmoid colon on CTA abdomen/pelvis. She was given 1u pRBC and transferred to [facility] for IR/GI evaluation. She stated that her last colonoscopy was more than 10 years ago- shared decision making with her doctors to not continue with them going forward due to her age. She endorses that her brother passed away from colorectal cancer, father passed away from stomach cancer and sister passed away from pancreatic cancer. She was placed on Plavix after having a TIA 6 years ago. She also mentioned that she was previously hospitalized a long time ago w/ kidney failure however she does not remember what caused the failure.     Labs in the [facility] ED remarkable for Hb of 10.4, coags wnl, Cr of 1.5 and albumin of 3.2. IR was consulted for possible embolization/angio- recommended medical management unless endoscopic tx is unsuccessful or contraindicated. GI consult was also placed- planning for flexible sigmoidoscopy. 89F w/ PMH of x TIAs on Plavix, T2DM, GERD, hypothyroidism and HTN presented w/ 2 days of BRBPR. She said that she woke up the day after thanksgiving and used the restroom to urinate and saw a lot of blood in the toilet bowl. She has had more than 4 episodes since including one as the patient was being interviewed. She did not endorse any pain with the bowel movement or bleeding. Denies any current chest pain, SOB, headaches, dizziness, palpitations, lightheadedness, visual disturbances, n/v, diarrhea, constipation, abdominal pain, melena, dysuria. She initially presented w/ these symptoms to [facility] where she was found to have active extravasation at the proximal sigmoid colon on CTA abdomen/pelvis. She was given 1u pRBC and transferred to [facility] for IR/GI evaluation. She stated that her last colonoscopy was more than 10 years ago- shared decision making with her doctors to not continue with them going forward due to her age. She endorses that her brother passed away from colorectal cancer, father passed away from stomach cancer and sister passed away from pancreatic cancer. She was placed on Plavix after having a TIA 6 years ago iso b/l carotid artery stenosis. She also mentioned that she was previously hospitalized a long time ago w/ kidney failure however she does not remember what caused the failure.     Labs in the [facility] ED remarkable for Hb of 10.4, coags wnl, Cr of 1.5 and albumin of 3.2. IR was consulted for possible embolization/angio- recommended medical management unless endoscopic tx is unsuccessful or contraindicated. GI consult was also placed- planning for flexible sigmoidoscopy.

## 2024-11-30 NOTE — ED PROVIDER NOTE - PHYSICAL EXAMINATION
GEN: awake and alert, well-appearing, no acute distress  CV: (+) RRR, (-) murmurs/rubs/gallops  RESP: (+) CTABL, (-) increased WOB, (-) rales, (-) rhonchi, (-) wheezing  ABD: (+) soft, (-) tenderness, (-) guarding  EXT: (-) joint deformities, (-) edema, (-) tenderness, (+) grossly intact ROM, (+) equal pulses in upper & lower extremities  NEURO: mental status as above, (-) gross strength/sensation deficits   RECTAL: performed by Dr. Robles, chaperoned by Dr. Bennett; (+) dark red blood on MADAY

## 2024-11-30 NOTE — ED ADULT NURSE NOTE - NSFALLCONCLUSION_ED_ALL_ED
You can access the FollowMyHealth Patient Portal offered by [facility] by registering at the following website: [email or web address]. By joining to be’s FollowMyHealth portal, you will also be able to view your health information using other applications (apps) compatible with our system. Fall with Harm Risk

## 2024-11-30 NOTE — ED PROVIDER NOTE - CLINICAL SUMMARY MEDICAL DECISION MAKING FREE TEXT BOX
I was the supervising attending. I have independently seen face-to-face and examined the patient with the resident. I have reviewed the history and physical and discussed the MDM with the resident. I agree with the assessment and plan as presented unless otherwise documented as follows:    89F hx prior TIA on Plavix, DM, HTN, presenting w/ rectal bleeding. Initially presented to [facility] after 3 episodes throughout the day of painless BRBPR. Has never had similar symptoms in the past. Last colonoscopy many years ago but reportedly WNL. Last took Plavix this AM. Otherwise denies abdominal pain, nausea/vomiting, lightheadedness, syncope, CP/SOB, palpitations. Found to have active extravasation at the proximal sigmoid colon on CTA abdomen/pelvis. Given 1 pRBC, actively being transfused on arrival to [facility]. Transferred to [facility] for IR/GI evaluation, accepted under IR attending Dr. Angeles. Appears well, AOx3, not in acute distress. Vitals reassuring, no tachycardia/hypotension. Exam as above, rectal with small amount of dark red blood but no active large volume bleeding. Will repeat labs, complete transfusion in process. IR tania. -Iwona Bennett MD (Attending)

## 2024-11-30 NOTE — ED ADULT NURSE REASSESSMENT NOTE - NS ED NURSE REASSESS COMMENT FT1
Patient resting comfortably, repositioned in bed,  breathing unlabored, VSS, no signs of acute distress, side rails raised, call bell within reach, comfort and safety maintained. To be moved to [facility] at this time, updated on POC. Safe transport in place.

## 2024-11-30 NOTE — CONSULT NOTE ADULT - ASSESSMENT
Ms Berg is a 89yr old F with pmh significant for TIA on plavix, HTN, DM, CKD (baseline 1.5-1.6). She presented with cc of Brbpr x 1 day. patient reports that on friday, she had watery bm that was mostly blood and bright red, filled the toilet bowl. x 3 episodes.    Assessment    BRBPR    Patient with active brbpr, ct abd with active etravasation in sigmoid. Differentials - Diverticular bleed r/o ulcer, angiectasia, malignancy. Unlikely rapid upper GI given hemodynamic stability, stable Bun/cr ratio in addition to Ct findings.       Recommendation.      -Hold Plavix  -Monitor hb and transfuse with goal hb of >7  -Monitor Vitals  -Trend bm  -Will plan for Flex sig.   -Please keep NPO  -Thank you for the consult.    The above is not finalized until attending' s attestation.      Cathi Hartmann, PGY4  Gastroenterology and Hepatology  Available on TEAMS    For non urgent consult, please email [email or web address] (For [facility]) or [email or web address] (For [facility])  Long range page number [phone]. Short range [phone]   Ms Berg is a 89yr old F with pmh significant for TIA on plavix, HTN, DM, CKD (baseline 1.5-1.6). She presented with cc of Brbpr x 1 day. patient reports that on friday, she had watery bm that was mostly blood and bright red, filled the toilet bowl. x 3 episodes.    Assessment    BRBPR    Patient with active brbpr, ct abd with active etravasation in sigmoid. Differentials - Diverticular bleed r/o ulcer, angiectasia, malignancy. Unlikely rapid upper GI given hemodynamic stability, stable Bun/cr ratio in addition to Ct findings.       Recommendation.      -Hold Plavix  -Monitor hb and transfuse with goal hb of >7  -Monitor Vitals  -Trend bm  -If hemodynamically unstable will consider flex sig tomorrow, otherwise colonoscopy Monday  -CLD today, NPO at [facility] in case need of procedure  -Thank you for the consult.    The above is not finalized until attending' s attestation.      Cathi Hartmann, PGY4  Gastroenterology and Hepatology  Available on TEAMS    For non urgent consult, please email [email or web address] (For [facility]) or [email or web address] (For [facility])  Long range page number [phone]. Short range [phone]

## 2024-11-30 NOTE — ED ADULT NURSE NOTE - NSFALLHARMRISKINTERV_ED_ALL_ED

## 2024-11-30 NOTE — H&P ADULT - NSHPLABSRESULTS_GEN_ALL_CORE
9.2    7.76  )-----------( 149      ( 30 Nov 2024 12:49 )                26.9     11-30    140  |  107  |  30[H]  ----------------------------<  166[H]  4.1   |  21[L]  |  1.50[H]    Ca    8.2[L]      30 Nov 2024 04:01    TPro  5.6[L]  /  Alb  3.2[L]  /  TBili  0.9  /  DBili  x   /  AST  18  /  ALT  17  /  AlkPhos  63  11-30      LIVER FUNCTIONS - ( 30 Nov 2024 04:01 )  Alb: 3.2 g/dL / Pro: 5.6 g/dL / ALK PHOS: 63 U/L / ALT: 17 U/L / AST: 18 U/L / GGT: x           Urinalysis Basic - ( 30 Nov 2024 04:01 )    Color: x / Appearance: x / SG: x / pH: x  Gluc: 166 mg/dL / Ketone: x  / Bili: x / Urobili: x   Blood: x / Protein: x / Nitrite: x   Leuk Esterase: x / RBC: x / WBC x   Sq Epi: x / Non Sq Epi: x / Bacteria: x      PT/INR - ( 30 Nov 2024 04:01 )   PT: 11.0 sec;   INR: 0.96 ratio         PTT - ( 30 Nov 2024 04:01 )  PTT:29.2 sec    RADIOLOGY:    CTA Abdomen Pelvis w/ IV Contrast 11/30/24    IMPRESSION:  Area of active extravasation within the proximal sigmoid colon.    Atrophic pancreas with interval development of multiple cystic lesions   throughout the anchors measuring up to 1.9 cm. Consider one-year   follow-up with contrast-enhanced MRI or pancreatic protocol CT as   clinically indicated.

## 2024-11-30 NOTE — CONSULT NOTE ADULT - ATTENDING COMMENTS
Seen and examined. In brief, 89F DM2, TIA, HLD p/w painless BRBPR c/f diverticular bleed (though ddx includes AVM, Dieulafoy, less likely mass), CTA with diverticulosis and active extrav in proximal sigmoid. Afeb, HDS, abd soft. Hgb 10.8 --> 10.4 --> 9.2 (of note dropping all cell lines and receiving IVF). Given advanced age, grossly stable hgb, and hemodynamic stability, as well as propensity of diverticular bleeding to self-resolve and low success rates with endoscopic intervention for diverticular bleeding, will hold off on procedure for now. CLD, make NPO at MN or if becoming HDUS, flex sig tomorrow if decompensating vs full colonoscopy on Monday.

## 2024-11-30 NOTE — ED PROVIDER NOTE - NSICDXPASTMEDICALHX_GEN_ALL_CORE_FT
PAST MEDICAL HISTORY:  Diabetes     Gastroesophageal reflux disease, esophagitis presence not specified     Gout     Hyperlipidemia, unspecified hyperlipidemia type     Hypertension     Hypothyroidism, unspecified type

## 2024-11-30 NOTE — CONSULT NOTE ADULT - ASSESSMENT
Interventional Radiology    Evaluate for Procedure: GI bleed    HPI: 89 y/o F hx of TIA on plavix, HTN, DM, LE edema on lasix presents for 1 day of rectal bleeding. She had 3 watery episodes - red in color. unknown last colonoscopy but states it was many years. Initially presented to [facility] where she had an additional two bloody bowel movements. Transferred to [facility] for further evaluation. At time of consult, patient remains hemodynamically stable and receiving one pRBC.     Allergies: No Known Allergies    Medications (Abx/Cardiac/Anticoagulation/Blood Products)      Data:  T(C): 36.7  HR: 88  BP: 126/59  RR: 20  SpO2: 96%    -WBC 7.70 / HgB 10.8 / Hct 32.2 / Plt 202  -Na 141 / Cl 109 / BUN 34 / Glucose 152  -K 4.1 / CO2 25 / Cr 1.58  -ALT 26 / Alk Phos 66 / T.Bili 0.9  -INR 0.92 / PTT 29.4      Radiology: Reviewed    Assessment/Plan:   89 y/o F hx of TIA on plavix, HTN, DM, LE edema on lasix presents for 1 day of rectal bleeding.    Patient remains hemodynamically stable i/s/o lower gi bleed.     -Monitor H/H. Transfuse as necessary.  -Recommend medical management including coagulopathy correction and anticoagulation reversal as needed.  -If patient has hemodynamically significant bleeding despite adequate medical management, and endoscopic treatment is unsuccessful or contraindicated, please contact IR to further discuss angio/embo. In this scenario, ICU consult is required.     --  Mauro Shah, PGY-5  Vascular and Interventional Radiology   Available on [employer] Teams    - Non-emergent consults: Place IR consult order in [place]  - Emergent issues (pager): [facility] [phone]; [employer] [phone]; [phone]  - Scheduling questions: [facility] [phone]; [employer] [phone]  - Clinic/outpatient booking: [facility] [phone]; [facility] [phone]

## 2024-11-30 NOTE — PATIENT PROFILE ADULT - FALL HARM RISK - HARM RISK INTERVENTIONS
Assistance with ambulation/Assistance OOB with selected safe patient handling equipment/Communicate Risk of Fall with Harm to all staff/Discuss with provider need for PT consult/Monitor gait and stability/Provide patient with walking aids - walker, cane, crutches/Reinforce activity limits and safety measures with patient and family/Tailored Fall Risk Interventions/Use of alarms - bed, chair and/or voice tab/Visual Cue: Yellow wristband and red socks/Bed in lowest position, wheels locked, appropriate side rails in place/Call bell, personal items and telephone in reach/Instruct patient to call for assistance before getting out of bed or chair/Non-slip footwear when patient is out of bed/[place] to call system/Physically safe environment - no spills, clutter or unnecessary equipment/Purposeful Proactive Rounding/Room/bathroom lighting operational, light cord in reach

## 2024-12-01 LAB
ALBUMIN SERPL ELPH-MCNC: 2.9 G/DL — LOW (ref 3.3–5)
ALP SERPL-CCNC: 54 U/L — SIGNIFICANT CHANGE UP (ref 40–120)
ALT FLD-CCNC: 14 U/L — SIGNIFICANT CHANGE UP (ref 10–45)
ANION GAP SERPL CALC-SCNC: 9 MMOL/L — SIGNIFICANT CHANGE UP (ref 5–17)
AST SERPL-CCNC: 17 U/L — SIGNIFICANT CHANGE UP (ref 10–40)
BASOPHILS # BLD AUTO: 0.03 K/UL — SIGNIFICANT CHANGE UP (ref 0–0.2)
BASOPHILS NFR BLD AUTO: 0.3 % — SIGNIFICANT CHANGE UP (ref 0–2)
BILIRUB SERPL-MCNC: 0.9 MG/DL — SIGNIFICANT CHANGE UP (ref 0.2–1.2)
BUN SERPL-MCNC: 25 MG/DL — HIGH (ref 7–23)
CALCIUM SERPL-MCNC: 7.9 MG/DL — LOW (ref 8.4–10.5)
CHLORIDE SERPL-SCNC: 110 MMOL/L — HIGH (ref 96–108)
CO2 SERPL-SCNC: 22 MMOL/L — SIGNIFICANT CHANGE UP (ref 22–31)
CREAT SERPL-MCNC: 1.35 MG/DL — HIGH (ref 0.5–1.3)
EGFR: 36 ML/MIN/1.73M2 — LOW
EOSINOPHIL # BLD AUTO: 0.29 K/UL — SIGNIFICANT CHANGE UP (ref 0–0.5)
EOSINOPHIL NFR BLD AUTO: 3.3 % — SIGNIFICANT CHANGE UP (ref 0–6)
GLUCOSE BLDC GLUCOMTR-MCNC: 138 MG/DL — HIGH (ref 70–99)
GLUCOSE BLDC GLUCOMTR-MCNC: 144 MG/DL — HIGH (ref 70–99)
GLUCOSE BLDC GLUCOMTR-MCNC: 174 MG/DL — HIGH (ref 70–99)
GLUCOSE BLDC GLUCOMTR-MCNC: 190 MG/DL — HIGH (ref 70–99)
GLUCOSE SERPL-MCNC: 129 MG/DL — HIGH (ref 70–99)
HCT VFR BLD CALC: 26.4 % — LOW (ref 34.5–45)
HCT VFR BLD CALC: 27.9 % — LOW (ref 34.5–45)
HCT VFR BLD CALC: 28 % — LOW (ref 34.5–45)
HGB BLD-MCNC: 8.7 G/DL — LOW (ref 11.5–15.5)
HGB BLD-MCNC: 9.2 G/DL — LOW (ref 11.5–15.5)
HGB BLD-MCNC: 9.4 G/DL — LOW (ref 11.5–15.5)
IMM GRANULOCYTES NFR BLD AUTO: 0.6 % — SIGNIFICANT CHANGE UP (ref 0–0.9)
LYMPHOCYTES # BLD AUTO: 1.02 K/UL — SIGNIFICANT CHANGE UP (ref 1–3.3)
LYMPHOCYTES # BLD AUTO: 11.7 % — LOW (ref 13–44)
MAGNESIUM SERPL-MCNC: 1.9 MG/DL — SIGNIFICANT CHANGE UP (ref 1.6–2.6)
MCHC RBC-ENTMCNC: 29.3 PG — SIGNIFICANT CHANGE UP (ref 27–34)
MCHC RBC-ENTMCNC: 29.3 PG — SIGNIFICANT CHANGE UP (ref 27–34)
MCHC RBC-ENTMCNC: 30.2 PG — SIGNIFICANT CHANGE UP (ref 27–34)
MCHC RBC-ENTMCNC: 32.9 G/DL — SIGNIFICANT CHANGE UP (ref 32–36)
MCHC RBC-ENTMCNC: 33 G/DL — SIGNIFICANT CHANGE UP (ref 32–36)
MCHC RBC-ENTMCNC: 33.7 G/DL — SIGNIFICANT CHANGE UP (ref 32–36)
MCV RBC AUTO: 88.9 FL — SIGNIFICANT CHANGE UP (ref 80–100)
MCV RBC AUTO: 89.2 FL — SIGNIFICANT CHANGE UP (ref 80–100)
MCV RBC AUTO: 89.7 FL — SIGNIFICANT CHANGE UP (ref 80–100)
MONOCYTES # BLD AUTO: 0.83 K/UL — SIGNIFICANT CHANGE UP (ref 0–0.9)
MONOCYTES NFR BLD AUTO: 9.5 % — SIGNIFICANT CHANGE UP (ref 2–14)
NEUTROPHILS # BLD AUTO: 6.52 K/UL — SIGNIFICANT CHANGE UP (ref 1.8–7.4)
NEUTROPHILS NFR BLD AUTO: 74.6 % — SIGNIFICANT CHANGE UP (ref 43–77)
NRBC # BLD: 0 /100 WBCS — SIGNIFICANT CHANGE UP (ref 0–0)
PHOSPHATE SERPL-MCNC: 2.3 MG/DL — LOW (ref 2.5–4.5)
PLATELET # BLD AUTO: 136 K/UL — LOW (ref 150–400)
PLATELET # BLD AUTO: 137 K/UL — LOW (ref 150–400)
PLATELET # BLD AUTO: 138 K/UL — LOW (ref 150–400)
POTASSIUM SERPL-MCNC: 4.2 MMOL/L — SIGNIFICANT CHANGE UP (ref 3.5–5.3)
POTASSIUM SERPL-SCNC: 4.2 MMOL/L — SIGNIFICANT CHANGE UP (ref 3.5–5.3)
PROT SERPL-MCNC: 4.7 G/DL — LOW (ref 6–8.3)
RBC # BLD: 2.97 M/UL — LOW (ref 3.8–5.2)
RBC # BLD: 3.11 M/UL — LOW (ref 3.8–5.2)
RBC # BLD: 3.14 M/UL — LOW (ref 3.8–5.2)
RBC # FLD: 15.7 % — HIGH (ref 10.3–14.5)
RBC # FLD: 15.8 % — HIGH (ref 10.3–14.5)
RBC # FLD: 15.9 % — HIGH (ref 10.3–14.5)
SODIUM SERPL-SCNC: 141 MMOL/L — SIGNIFICANT CHANGE UP (ref 135–145)
WBC # BLD: 10.08 K/UL — SIGNIFICANT CHANGE UP (ref 3.8–10.5)
WBC # BLD: 10.49 K/UL — SIGNIFICANT CHANGE UP (ref 3.8–10.5)
WBC # BLD: 8.74 K/UL — SIGNIFICANT CHANGE UP (ref 3.8–10.5)
WBC # FLD AUTO: 10.08 K/UL — SIGNIFICANT CHANGE UP (ref 3.8–10.5)
WBC # FLD AUTO: 10.49 K/UL — SIGNIFICANT CHANGE UP (ref 3.8–10.5)
WBC # FLD AUTO: 8.74 K/UL — SIGNIFICANT CHANGE UP (ref 3.8–10.5)

## 2024-12-01 PROCEDURE — 99232 SBSQ HOSP IP/OBS MODERATE 35: CPT | Mod: GC

## 2024-12-01 PROCEDURE — 99233 SBSQ HOSP IP/OBS HIGH 50: CPT | Mod: GC

## 2024-12-01 RX ADMIN — Medication 50 MILLILITER(S): at 06:22

## 2024-12-01 RX ADMIN — Medication 75 MICROGRAM(S): at 06:22

## 2024-12-01 RX ADMIN — ALLOPURINOL 300 MILLIGRAM(S): 300 TABLET ORAL at 21:10

## 2024-12-01 RX ADMIN — Medication 80 MILLIGRAM(S): at 21:10

## 2024-12-01 RX ADMIN — Medication 1: at 17:16

## 2024-12-01 NOTE — PROGRESS NOTE ADULT - ATTENDING COMMENTS
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 Initially presented to [facility] where CTA abdomen was positive for proximal sigmoid bleed. She was transferred to [facility] for IR evaluation. patient has received PRBC transfusions.      1. LGIB, likely diverticular  2. TIA with carotis artery stenosis  3. HTN  4. IDDM2    - Has been having DE bleed on and off, BP is stable, no abdominal pain  - Hb 8.7 after 1 PRBC transfusion. Reviewed CTA abdomen done in [facility]- extravasation in proximal sigmoid   - Off Plavix. 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 Initially presented to [facility] where CTA abdomen was positive for proximal sigmoid bleed. She was transferred to [facility] for IR evaluation. patient has received PRBC transfusions.      1. LGIB, likely diverticular  2. TIA with carotis artery stenosis  3. HTN  4. IDDM2    - Has been having WA bleed on and off, BP is stable, no abdominal pain  - Hb 8.7 after 1 PRBC transfusion. Reviewed CTA abdomen done in [facility]- extravasation in proximal sigmoid   - Off Plavix. GI plans noted- unless unstable, would not do colonoscopy or Flex-Sig  - CBC q 8 hrs, PRBC transfusions if Hb <8.0  - BP has been sable off labetalol and Amlodipine   - PT eval  - SCD  - Time spent 50 min excluding house staff teaching

## 2024-12-01 NOTE — PROGRESS NOTE ADULT - ATTENDING COMMENTS
Seen and examined. In brief, 89F DM2, TIA, HLD p/w painless BRBPR c/f diverticular bleed (though ddx includes AVM, Dieulafoy, less likely mass), CTA with diverticulosis and active extrav in proximal sigmoid. Hgb 10.8 --> 10.4 --> 9.2.    Has not had a bloody bowel movement since yesterday. Received 1u pRBCs yesterday, 8 --> 8.7 --> 9.4. Suspect diverticular bleed that has self-resolved however she is concerned about re-bleed at home and would like endoscopic eval before discharge (she is 89 years old). Discussed that it is unlikely we will find a source of bleeding/ find the bleeding diverticulum. Regular diet today, CLD tomorrow for colonoscopy on Tuesday.

## 2024-12-01 NOTE — PROGRESS NOTE ADULT - PROBLEM SELECTOR PLAN 11
DVT: SCDs   Diet: NPO   Code status: Full DVT: SCDs   Diet: Full liquids, NPO after midnight   Code status: Full

## 2024-12-01 NOTE — PROGRESS NOTE ADULT - SUBJECTIVE AND OBJECTIVE BOX
Patient is a 89y old  Female who presents with a chief complaint of BRBPR (2024 09:08)      ======Overnight/Subjective======  Overnight- received 1u pRBC     Subjective    Brief daily plan    ======Medications======  MEDICATIONS  (STANDING):  allopurinol 300 milliGRAM(s) Oral <User Schedule>  atorvastatin 80 milliGRAM(s) Oral at bedtime  dextrose 5%. 1000 milliLiter(s) (100 mL/Hr) IV Continuous <Continuous>  dextrose 5%. 1000 milliLiter(s) (50 mL/Hr) IV Continuous <Continuous>  dextrose 50% Injectable 25 Gram(s) IV Push once  dextrose 50% Injectable 12.5 Gram(s) IV Push once  dextrose 50% Injectable 25 Gram(s) IV Push once  dextrose Oral Gel 15 Gram(s) Oral once  glucagon  Injectable 1 milliGRAM(s) IntraMuscular once  insulin regular  human corrective regimen sliding scale   SubCutaneous every 6 hours  lactated ringers. 1000 milliLiter(s) (50 mL/Hr) IV Continuous <Continuous>  levothyroxine 75 MICROGram(s) Oral daily    MEDICATIONS  (PRN):  acetaminophen     Tablet .. 650 milliGRAM(s) Oral every 6 hours PRN Temp greater or equal to 38C (100.4F), Mild Pain (1 - 3)      ======Vital Signs======  T(C): 36.7 (24 @ 05:00), Max: 37.7 (24 @ 20:00)  T(F): 98 (24 @ 05:00), Max: 99.9 (24 @ 20:00)  HR: 78 (24 @ 05:00) (78 - 94)  BP: 145/51 (24 @ 05:00) (127/51 - 148/76)  BP(mean): --  RR: 18 (24 @ 05:00) (18 - 20)  SpO2: 97% (24 @ 05:00) (94% - 100%)    ======Physical exams======    GENERAL: NAD, lying in bed comfortably  HEAD:  Atraumatic, normocephalic  EYES: EOMI, PERRLA, conjunctiva and sclera clear  ENT: Moist mucous membranes  NECK: Supple, no JVD  HEART: Regular rate and rhythm, no murmurs, rubs, or gallops  LUNGS: Unlabored respirations.  Clear to auscultation bilaterally, no crackles, wheezing, or rhonchi  ABDOMEN: Soft, nontender, nondistended, +BS  EXTREMITIES: 2+ peripheral pulses bilaterally. No clubbing, cyanosis, LLE 1+ edema   NERVOUS SYSTEM:  A&Ox3, no focal deficits   SKIN: No rashes or lesions, some bruising noted on left arm    ======Labs======                8.7[L]  8.74 >----------< 137[L]  (MCV: 88.9)                26.4[L]   141 | 110[H] | 25[H]  -----------------------< 129[H]  4.2 | 22 | 1.35[H]    TPro: 4.7[L] / Alb: 2.9[L] / TBili: 0.9 / DBili: -- / AlkPhos: 54 / ALT: 14 / AST: 17 (24 @ 05:01)  Ca: 7.9[L] / Phos: 2.3[L] / M.9 (24 @ 05:01)    Gas: 7.38 / 39 / 48[H] / 23 / 81.3% / -1.8 (24 @ 03:50)  PT/INR - ( 2024 04:01 )   PT: 11.0 sec;   INR: 0.96 ratio         PTT - ( 2024 04:01 )  PTT:29.2 sec    ======Microbiology======  Urinalysis Basic - ( 01 Dec 2024 05:01 )    Color: x / Appearance: x / SG: x / pH: x  Gluc: 129 mg/dL / Ketone: x  / Bili: x / Urobili: x   Blood: x / Protein: x / Nitrite: x   Leuk Esterase: x / RBC: x / WBC x   Sq Epi: x / Non Sq Epi: x / Bacteria: x      ======I&O's======  I&O's Summary    2024 07:01  -  01 Dec 2024 07:00  --------------------------------------------------------  IN: 1490 mL / OUT: 1200 mL / NET: 290 mL   Patient is a 89y old  Female who presents with a chief complaint of BRBPR (2024 09:08)      ======Overnight/Subjective======  Overnight- received 1u pRBC, trending Hb q8h     Subjective- pt seen and examined at bedside. reported that her last BM was last night and it had blood in it. however nothing since. Denies any chest pain, SOB, headaches, dizziness, palpitations, lightheadedness, visual disturbances, n/v, diarrhea, constipation, abdominal pain, melena, dysuria.    Brief daily plan- Colonoscopy possibly tomorrow     ======Medications======  MEDICATIONS  (STANDING):  allopurinol 300 milliGRAM(s) Oral <User Schedule>  atorvastatin 80 milliGRAM(s) Oral at bedtime  dextrose 5%. 1000 milliLiter(s) (100 mL/Hr) IV Continuous <Continuous>  dextrose 5%. 1000 milliLiter(s) (50 mL/Hr) IV Continuous <Continuous>  dextrose 50% Injectable 25 Gram(s) IV Push once  dextrose 50% Injectable 12.5 Gram(s) IV Push once  dextrose 50% Injectable 25 Gram(s) IV Push once  dextrose Oral Gel 15 Gram(s) Oral once  glucagon  Injectable 1 milliGRAM(s) IntraMuscular once  insulin regular  human corrective regimen sliding scale   SubCutaneous every 6 hours  lactated ringers. 1000 milliLiter(s) (50 mL/Hr) IV Continuous <Continuous>  levothyroxine 75 MICROGram(s) Oral daily    MEDICATIONS  (PRN):  acetaminophen     Tablet .. 650 milliGRAM(s) Oral every 6 hours PRN Temp greater or equal to 38C (100.4F), Mild Pain (1 - 3)      ======Vital Signs======  T(C): 36.7 (24 @ 05:00), Max: 37.7 (24 @ 20:00)  T(F): 98 (24 @ 05:00), Max: 99.9 (24 @ 20:00)  HR: 78 (24 @ 05:00) (78 - 94)  BP: 145/51 (24 @ 05:00) (127/51 - 148/76)  BP(mean): --  RR: 18 (24 @ 05:00) (18 - 20)  SpO2: 97% (24 @ 05:00) (94% - 100%)    ======Physical exams======    GENERAL: NAD, lying in bed comfortably  HEAD:  Atraumatic, normocephalic  EYES: EOMI, PERRLA, conjunctiva and sclera clear  ENT: Moist mucous membranes  NECK: Supple, no JVD  HEART: Regular rate and rhythm, no murmurs, rubs, or gallops  LUNGS: Unlabored respirations.  Clear to auscultation bilaterally, no crackles, wheezing, or rhonchi  ABDOMEN: Soft, nontender, nondistended, +BS  EXTREMITIES: 2+ peripheral pulses bilaterally. No clubbing, cyanosis, LLE 1+ edema   NERVOUS SYSTEM:  A&Ox3, no focal deficits   SKIN: No rashes or lesions, some bruising noted on left arm    ======Labs======                8.7[L]  8.74 >----------< 137[L]  (MCV: 88.9)                26.4[L]   141 | 110[H] | 25[H]  -----------------------< 129[H]  4.2 | 22 | 1.35[H]    TPro: 4.7[L] / Alb: 2.9[L] / TBili: 0.9 / DBili: -- / AlkPhos: 54 / ALT: 14 / AST: 17 (24 @ 05:01)  Ca: 7.9[L] / Phos: 2.3[L] / M.9 (24 @ 05:01)    Gas: 7.38 / 39 / 48[H] / 23 / 81.3% / -1.8 (24 @ 03:50)  PT/INR - ( 2024 04:01 )   PT: 11.0 sec;   INR: 0.96 ratio         PTT - ( 2024 04:01 )  PTT:29.2 sec    ======Microbiology======  Urinalysis Basic - ( 01 Dec 2024 05:01 )    Color: x / Appearance: x / SG: x / pH: x  Gluc: 129 mg/dL / Ketone: x  / Bili: x / Urobili: x   Blood: x / Protein: x / Nitrite: x   Leuk Esterase: x / RBC: x / WBC x   Sq Epi: x / Non Sq Epi: x / Bacteria: x      ======I&O's======  I&O's Summary    2024 07:01  -  01 Dec 2024 07:00  --------------------------------------------------------  IN: 1490 mL / OUT: 1200 mL / NET: 290 mL

## 2024-12-01 NOTE — PROGRESS NOTE ADULT - SUBJECTIVE AND OBJECTIVE BOX
seen and examined   some hematochezia last night but none this morning.  hb is stable and holding up. Not required any blood transfusion.    Progress Note     SUBJECTIVE: Patient seen and examined at bedside.     OBJECTIVE:    VITAL SIGNS:  ICU Vital Signs Last 24 Hrs  T(C): 36.8 (01 Dec 2024 08:01), Max: 37.7 (30 Nov 2024 20:00)  T(F): 98.3 (01 Dec 2024 08:01), Max: 99.9 (30 Nov 2024 20:00)  HR: 96 (01 Dec 2024 08:01) (78 - 96)  BP: 135/65 (01 Dec 2024 08:01) (127/51 - 146/72)  BP(mean): --  ABP: --  ABP(mean): --  RR: 18 (01 Dec 2024 08:01) (18 - 20)  SpO2: 99% (01 Dec 2024 08:01) (95% - 100%)    O2 Parameters below as of 01 Dec 2024 08:01  Patient On (Oxygen Delivery Method): room air              11-30 @ 07:01  -  12-01 @ 07:00  --------------------------------------------------------  IN: 1490 mL / OUT: 1200 mL / NET: 290 mL        PHYSICAL EXAM:    General: NAD  HEENT: NC/AT  Neck: supple  Respiratory: Regular RR, no increase in WOB  Cardiovascular: RRR  Abdomen: soft, NT/ND; +BS x4  Extremities: WWP, 2+ peripheral pulses b/l  Skin: normal color and turgor; no rash  Neurological: A&OX    MEDICATIONS:  MEDICATIONS  (STANDING):  allopurinol 300 milliGRAM(s) Oral <User Schedule>  atorvastatin 80 milliGRAM(s) Oral at bedtime  dextrose 5%. 1000 milliLiter(s) (100 mL/Hr) IV Continuous <Continuous>  dextrose 5%. 1000 milliLiter(s) (50 mL/Hr) IV Continuous <Continuous>  dextrose 50% Injectable 25 Gram(s) IV Push once  dextrose 50% Injectable 12.5 Gram(s) IV Push once  dextrose 50% Injectable 25 Gram(s) IV Push once  dextrose Oral Gel 15 Gram(s) Oral once  glucagon  Injectable 1 milliGRAM(s) IntraMuscular once  insulin regular  human corrective regimen sliding scale   SubCutaneous every 6 hours  lactated ringers. 1000 milliLiter(s) (50 mL/Hr) IV Continuous <Continuous>  levothyroxine 75 MICROGram(s) Oral daily    MEDICATIONS  (PRN):  acetaminophen     Tablet .. 650 milliGRAM(s) Oral every 6 hours PRN Temp greater or equal to 38C (100.4F), Mild Pain (1 - 3)      ALLERGIES:  Allergies    No Known Allergies    Intolerances        LABS:                        8.7    8.74  )-----------( 137      ( 01 Dec 2024 05:01 )             26.4     12-01    141  |  110[H]  |  25[H]  ----------------------------<  129[H]  4.2   |  22  |  1.35[H]    Ca    7.9[L]      01 Dec 2024 05:01  Phos  2.3     12-01  Mg     1.9     12-01    TPro  4.7[L]  /  Alb  2.9[L]  /  TBili  0.9  /  DBili  x   /  AST  17  /  ALT  14  /  AlkPhos  54  12-01    PT/INR - ( 30 Nov 2024 04:01 )   PT: 11.0 sec;   INR: 0.96 ratio         PTT - ( 30 Nov 2024 04:01 )  PTT:29.2 sec  Urinalysis Basic - ( 01 Dec 2024 05:01 )    Color: x / Appearance: x / SG: x / pH: x  Gluc: 129 mg/dL / Ketone: x  / Bili: x / Urobili: x   Blood: x / Protein: x / Nitrite: x   Leuk Esterase: x / RBC: x / WBC x   Sq Epi: x / Non Sq Epi: x / Bacteria: x         seen and examined   some hematochezia last night but none this morning.  hb is stable and holding up. S/p 1unit 11/30.        OBJECTIVE:    VITAL SIGNS:  ICU Vital Signs Last 24 Hrs  T(C): 36.8 (01 Dec 2024 08:01), Max: 37.7 (30 Nov 2024 20:00)  T(F): 98.3 (01 Dec 2024 08:01), Max: 99.9 (30 Nov 2024 20:00)  HR: 96 (01 Dec 2024 08:01) (78 - 96)  BP: 135/65 (01 Dec 2024 08:01) (127/51 - 146/72)  BP(mean): --  ABP: --  ABP(mean): --  RR: 18 (01 Dec 2024 08:01) (18 - 20)  SpO2: 99% (01 Dec 2024 08:01) (95% - 100%)    O2 Parameters below as of 01 Dec 2024 08:01  Patient On (Oxygen Delivery Method): room air              11-30 @ 07:01  -  12-01 @ 07:00  --------------------------------------------------------  IN: 1490 mL / OUT: 1200 mL / NET: 290 mL        PHYSICAL EXAM:    General: NAD  HEENT: NC/AT  Neck: supple  Respiratory: Regular RR, no increase in WOB  Cardiovascular: RRR  Abdomen: soft, NT/ND; +BS x4  Extremities: WWP, 2+ peripheral pulses b/l  Skin: normal color and turgor; no rash  Neurological: A&OX    MEDICATIONS:  MEDICATIONS  (STANDING):  allopurinol 300 milliGRAM(s) Oral <User Schedule>  atorvastatin 80 milliGRAM(s) Oral at bedtime  dextrose 5%. 1000 milliLiter(s) (100 mL/Hr) IV Continuous <Continuous>  dextrose 5%. 1000 milliLiter(s) (50 mL/Hr) IV Continuous <Continuous>  dextrose 50% Injectable 25 Gram(s) IV Push once  dextrose 50% Injectable 12.5 Gram(s) IV Push once  dextrose 50% Injectable 25 Gram(s) IV Push once  dextrose Oral Gel 15 Gram(s) Oral once  glucagon  Injectable 1 milliGRAM(s) IntraMuscular once  insulin regular  human corrective regimen sliding scale   SubCutaneous every 6 hours  lactated ringers. 1000 milliLiter(s) (50 mL/Hr) IV Continuous <Continuous>  levothyroxine 75 MICROGram(s) Oral daily    MEDICATIONS  (PRN):  acetaminophen     Tablet .. 650 milliGRAM(s) Oral every 6 hours PRN Temp greater or equal to 38C (100.4F), Mild Pain (1 - 3)      ALLERGIES:  Allergies    No Known Allergies    Intolerances        LABS:                        8.7    8.74  )-----------( 137      ( 01 Dec 2024 05:01 )             26.4     12-01    141  |  110[H]  |  25[H]  ----------------------------<  129[H]  4.2   |  22  |  1.35[H]    Ca    7.9[L]      01 Dec 2024 05:01  Phos  2.3     12-01  Mg     1.9     12-01    TPro  4.7[L]  /  Alb  2.9[L]  /  TBili  0.9  /  DBili  x   /  AST  17  /  ALT  14  /  AlkPhos  54  12-01    PT/INR - ( 30 Nov 2024 04:01 )   PT: 11.0 sec;   INR: 0.96 ratio         PTT - ( 30 Nov 2024 04:01 )  PTT:29.2 sec  Urinalysis Basic - ( 01 Dec 2024 05:01 )    Color: x / Appearance: x / SG: x / pH: x  Gluc: 129 mg/dL / Ketone: x  / Bili: x / Urobili: x   Blood: x / Protein: x / Nitrite: x   Leuk Esterase: x / RBC: x / WBC x   Sq Epi: x / Non Sq Epi: x / Bacteria: x

## 2024-12-01 NOTE — PROGRESS NOTE ADULT - PROBLEM SELECTOR PLAN 1
CTA Abdomen/Pelvis w/ IV contrast showing area of active extravasation within the proximal sigmoid colon.  Hb on arrival to [facility] 10.4   Last colon >10 years ago    - GI consulted- planning for flex sig or colonoscopy on Monday 12/2 if stable   - IR consulted- medical management for now   - Keep NPO for procedure   - IVF at 50cc/hr while NPO  - Transfuse for Hb <7 or if patient is actively bleeding and symptomatic CTA Abdomen/Pelvis w/ IV contrast showing area of active extravasation within the proximal sigmoid colon.  Hb on arrival to [facility] 10.4   Last colon >10 years ago    - GI consulted- planning colonoscopy on Monday 12/2   - IR consulted- medical management for now   - NPO after midnight   - Transfuse for Hb <7 or if patient is actively bleeding and symptomatic

## 2024-12-01 NOTE — PROGRESS NOTE ADULT - PROBLEM SELECTOR PLAN 2
Iso of LGIB, Hb on arrival to [facility] 10.4   Continues to have active hematochezia   s/p 1u pRBC at OSH   s/p 1u pRBC overnight 12/1     - Trend and monitor CBC q8h   - f/u repeat CBC- latest Hb 8.7   - maintain active type & screen  - Transfuse for Hb <7 or if patient is actively bleeding and symptomatic  - Plan for flex sig/colonoscopy w/ GI Iso of LGIB, Hb on arrival to [facility] 10.4   Continues to have active hematochezia   s/p 1u pRBC at OSH   s/p 1u pRBC overnight 12/1     - Trend and monitor CBC q8h   - f/u repeat CBC- latest Hb 8.7   - maintain active type & screen  - Transfuse for Hb <7 or if patient is actively bleeding and symptomatic  - Plan for colonoscopy w/ GI 12/2

## 2024-12-01 NOTE — PROGRESS NOTE ADULT - ASSESSMENT
Ms Berg is a 89yr old F with pmh significant for TIA on plavix, HTN, DM, CKD (baseline 1.5-1.6). She presented with cc of Brbpr x 1 day. patient reports that on friday, she had watery bm that was mostly blood and bright red, filled the toilet bowl. x 3 episodes.    Assessment    BRBPR    Patient with active brbpr, ct abd with active etravasation in sigmoid. Differentials - Diverticular bleed r/o ulcer, angiectasia, malignancy. Unlikely rapid upper GI given hemodynamic stability, stable Bun/cr ratio in addition to Ct findings.     hb today stable. bp wnl      Recommendation.        -Monitor hb and transfuse with goal hb of >7  -Monitor Vitals  -Trend bm  -If hemodynamically unstable will consider flex sig. Otherwise, if bleeding subsides, there may not be any need for endoscopic evaluation, considering age and also low yield for diagnosing active diverticular bleed  -Can advance diet today.   -Hold Plavix      The above is not finalized until attending' s attestation    Cathi Hartmann, PGY4  Gastroenterology and Hepatology  Available on TEAMS    For non urgent consult, please email [email or web address] (For [place]) or [email or web address] (For [place])  Long range page number [phone]. Short range [phone]       Ms Berg is a 89yr old F with pmh significant for TIA on plavix, HTN, DM, CKD (baseline 1.5-1.6). She presented with cc of Brbpr x 1 day. patient reports that on friday, she had watery bm that was mostly blood and bright red, filled the toilet bowl. x 3 episodes.    Assessment    BRBPR    Patient with active brbpr, ct abd with active etravasation in sigmoid. Differentials - Diverticular bleed r/o ulcer, angiectasia, malignancy. Unlikely rapid upper GI given hemodynamic stability, stable Bun/cr ratio in addition to Ct findings.     hb today stable. bp wnl      Recommendation.        -Monitor hb and transfuse with goal hb of >7  -Monitor Vitals  -Trend bm  -Colonoscopy is low yield for diagnosing an active diverticular bleed. Given age, would preferably monitor patient especially if bleeding subsides. However had a significant drop in hemoglobin and from our conversation she wants to pursue colonoscopy. Would plan for procedure next week.  -Can advance diet today. Then clear liquid on Monday.   -Hold Plavix      The above is not finalized until attending' s attestation    Cathi Hartmann, PGY4  Gastroenterology and Hepatology  Available on TEAMS    For non urgent consult, please email [email or web address] (For [place]) or [email or web address] (For [place])  Long range page number [phone]. Short range [phone]

## 2024-12-02 ENCOUNTER — RESULT REVIEW (OUTPATIENT)
Age: 88
End: 2024-12-02

## 2024-12-02 LAB
ANION GAP SERPL CALC-SCNC: 11 MMOL/L — SIGNIFICANT CHANGE UP (ref 5–17)
BUN SERPL-MCNC: 22 MG/DL — SIGNIFICANT CHANGE UP (ref 7–23)
CALCIUM SERPL-MCNC: 8.1 MG/DL — LOW (ref 8.4–10.5)
CHLORIDE SERPL-SCNC: 109 MMOL/L — HIGH (ref 96–108)
CO2 SERPL-SCNC: 22 MMOL/L — SIGNIFICANT CHANGE UP (ref 22–31)
CREAT SERPL-MCNC: 1.32 MG/DL — HIGH (ref 0.5–1.3)
EGFR: 36 ML/MIN/1.73M2 — LOW
GLUCOSE BLDC GLUCOMTR-MCNC: 104 MG/DL — HIGH (ref 70–99)
GLUCOSE BLDC GLUCOMTR-MCNC: 124 MG/DL — HIGH (ref 70–99)
GLUCOSE BLDC GLUCOMTR-MCNC: 130 MG/DL — HIGH (ref 70–99)
GLUCOSE BLDC GLUCOMTR-MCNC: 156 MG/DL — HIGH (ref 70–99)
GLUCOSE BLDC GLUCOMTR-MCNC: 268 MG/DL — HIGH (ref 70–99)
GLUCOSE SERPL-MCNC: 126 MG/DL — HIGH (ref 70–99)
HCT VFR BLD CALC: 27.3 % — LOW (ref 34.5–45)
HCT VFR BLD CALC: 32.1 % — LOW (ref 34.5–45)
HGB BLD-MCNC: 10.5 G/DL — LOW (ref 11.5–15.5)
HGB BLD-MCNC: 9 G/DL — LOW (ref 11.5–15.5)
MAGNESIUM SERPL-MCNC: 1.9 MG/DL — SIGNIFICANT CHANGE UP (ref 1.6–2.6)
MCHC RBC-ENTMCNC: 29.2 PG — SIGNIFICANT CHANGE UP (ref 27–34)
MCHC RBC-ENTMCNC: 30.2 PG — SIGNIFICANT CHANGE UP (ref 27–34)
MCHC RBC-ENTMCNC: 32.7 G/DL — SIGNIFICANT CHANGE UP (ref 32–36)
MCHC RBC-ENTMCNC: 33 G/DL — SIGNIFICANT CHANGE UP (ref 32–36)
MCV RBC AUTO: 89.4 FL — SIGNIFICANT CHANGE UP (ref 80–100)
MCV RBC AUTO: 91.6 FL — SIGNIFICANT CHANGE UP (ref 80–100)
NRBC # BLD: 0 /100 WBCS — SIGNIFICANT CHANGE UP (ref 0–0)
NRBC # BLD: 0 /100 WBCS — SIGNIFICANT CHANGE UP (ref 0–0)
PHOSPHATE SERPL-MCNC: 2.8 MG/DL — SIGNIFICANT CHANGE UP (ref 2.5–4.5)
PLATELET # BLD AUTO: 141 K/UL — LOW (ref 150–400)
PLATELET # BLD AUTO: 172 K/UL — SIGNIFICANT CHANGE UP (ref 150–400)
POTASSIUM SERPL-MCNC: 4.3 MMOL/L — SIGNIFICANT CHANGE UP (ref 3.5–5.3)
POTASSIUM SERPL-SCNC: 4.3 MMOL/L — SIGNIFICANT CHANGE UP (ref 3.5–5.3)
RBC # BLD: 2.98 M/UL — LOW (ref 3.8–5.2)
RBC # BLD: 3.59 M/UL — LOW (ref 3.8–5.2)
RBC # FLD: 15.5 % — HIGH (ref 10.3–14.5)
RBC # FLD: 15.7 % — HIGH (ref 10.3–14.5)
SODIUM SERPL-SCNC: 142 MMOL/L — SIGNIFICANT CHANGE UP (ref 135–145)
WBC # BLD: 10.3 K/UL — SIGNIFICANT CHANGE UP (ref 3.8–10.5)
WBC # BLD: 8.69 K/UL — SIGNIFICANT CHANGE UP (ref 3.8–10.5)
WBC # FLD AUTO: 10.3 K/UL — SIGNIFICANT CHANGE UP (ref 3.8–10.5)
WBC # FLD AUTO: 8.69 K/UL — SIGNIFICANT CHANGE UP (ref 3.8–10.5)

## 2024-12-02 PROCEDURE — 93306 TTE W/DOPPLER COMPLETE: CPT | Mod: 26

## 2024-12-02 PROCEDURE — 99232 SBSQ HOSP IP/OBS MODERATE 35: CPT | Mod: GC

## 2024-12-02 RX ORDER — SOD SULF/SODIUM/NAHCO3/KCL/PEG
4000 SOLUTION, RECONSTITUTED, ORAL ORAL ONCE
Refills: 0 | Status: COMPLETED | OUTPATIENT
Start: 2024-12-02 | End: 2024-12-02

## 2024-12-02 RX ADMIN — Medication 80 MILLIGRAM(S): at 21:00

## 2024-12-02 RX ADMIN — Medication 4000 MILLILITER(S): at 14:27

## 2024-12-02 RX ADMIN — Medication 1: at 09:04

## 2024-12-02 RX ADMIN — Medication 75 MICROGRAM(S): at 05:17

## 2024-12-02 RX ADMIN — Medication 3: at 12:48

## 2024-12-02 NOTE — PROGRESS NOTE ADULT - ATTENDING COMMENTS
GI following for hematochezia and acute post hemorrhagic anemia.   CT showing active extravasation in sigmoid colon and colonic diverticulosis.   Likely LGIB 2/2 diverticulosis.   Plan for colonoscopy tomorrow for intraluminal evaluation. R/B discussed in detail with patient.   Trend h/h, transfuse as needed   rest of care per primary team   GI to follow, please call w questions.

## 2024-12-02 NOTE — PHYSICAL THERAPY INITIAL EVALUATION ADULT - NS ASR RISK AREAS PT EVAL
1. Caller Name: mother                        Call Back Number: [phone] (home)         How would the patient prefer to be contacted with a response: Phone call do NOT leave a detailed message    Mother called asking If there was anyone else in town for Psychology  that takes her insurance. Called mother and notified her that there is no other location in [facility] taking medicaid. That she will need to be seen in [facility]. Mother understands  
fall

## 2024-12-02 NOTE — PROGRESS NOTE ADULT - PROBLEM SELECTOR PLAN 3
Hx of b/l carotid artery stenosis   Shared decision making previously to medically manage w/ Plavix instead of surgical interventions due to her age     - Hold Plavix iso LGIB   - c/w atorvastatin 80mg qd Hx of b/l carotid artery stenosis   Shared decision making previously to medically manage w/ Plavix instead of surgical interventions due to her age and having the TIA while on both ASA and statin      - Hold Plavix iso LGIB   - c/w atorvastatin 80mg qd

## 2024-12-02 NOTE — PROGRESS NOTE ADULT - SUBJECTIVE AND OBJECTIVE BOX
Doing ok hb stable    OBJECTIVE:    VITAL SIGNS:  ICU Vital Signs Last 24 Hrs  T(C): 37.1 (02 Dec 2024 03:52), Max: 37.1 (02 Dec 2024 00:03)  T(F): 98.8 (02 Dec 2024 03:52), Max: 98.8 (02 Dec 2024 03:52)  HR: 80 (02 Dec 2024 03:52) (78 - 96)  BP: 125/61 (02 Dec 2024 03:52) (125/61 - 166/68)  BP(mean): --  ABP: --  ABP(mean): --  RR: 18 (02 Dec 2024 03:52) (18 - 18)  SpO2: 97% (02 Dec 2024 03:52) (95% - 99%)    O2 Parameters below as of 02 Dec 2024 03:52  Patient On (Oxygen Delivery Method): room air              11-30 @ 07:01  -  12-01 @ 07:00  --------------------------------------------------------  IN: 1490 mL / OUT: 1200 mL / NET: 290 mL    12-01 @ 07:01  -  12-02 @ 05:22  --------------------------------------------------------  IN: 630 mL / OUT: 400 mL / NET: 230 mL        PHYSICAL EXAM:    General: NAD  HEENT: NC/AT  Neck: supple  Respiratory: Regular RR, no increase in WOB  Cardiovascular: RRR  Abdomen: soft, NT/ND; +BS x4  Extremities: WWP, 2+ peripheral pulses b/l  Skin: normal color and turgor; no rash  Neurological: A&OX    MEDICATIONS:  MEDICATIONS  (STANDING):  allopurinol 300 milliGRAM(s) Oral <User Schedule>  atorvastatin 80 milliGRAM(s) Oral at bedtime  dextrose 5%. 1000 milliLiter(s) (100 mL/Hr) IV Continuous <Continuous>  dextrose 5%. 1000 milliLiter(s) (50 mL/Hr) IV Continuous <Continuous>  dextrose 50% Injectable 25 Gram(s) IV Push once  dextrose 50% Injectable 12.5 Gram(s) IV Push once  dextrose 50% Injectable 25 Gram(s) IV Push once  dextrose Oral Gel 15 Gram(s) Oral once  glucagon  Injectable 1 milliGRAM(s) IntraMuscular once  insulin lispro (ADMELOG) corrective regimen sliding scale   SubCutaneous three times a day before meals  insulin lispro (ADMELOG) corrective regimen sliding scale   SubCutaneous at bedtime  levothyroxine 75 MICROGram(s) Oral daily    MEDICATIONS  (PRN):  acetaminophen     Tablet .. 650 milliGRAM(s) Oral every 6 hours PRN Temp greater or equal to 38C (100.4F), Mild Pain (1 - 3)      ALLERGIES:  Allergies    No Known Allergies    Intolerances        LABS:                        9.2    10.49 )-----------( 138      ( 01 Dec 2024 21:58 )             28.0     12-01    141  |  110[H]  |  25[H]  ----------------------------<  129[H]  4.2   |  22  |  1.35[H]    Ca    7.9[L]      01 Dec 2024 05:01  Phos  2.3     12-01  Mg     1.9     12-01    TPro  4.7[L]  /  Alb  2.9[L]  /  TBili  0.9  /  DBili  x   /  AST  17  /  ALT  14  /  AlkPhos  54  12-01      Urinalysis Basic - ( 01 Dec 2024 05:01 )    Color: x / Appearance: x / SG: x / pH: x  Gluc: 129 mg/dL / Ketone: x  / Bili: x / Urobili: x   Blood: x / Protein: x / Nitrite: x   Leuk Esterase: x / RBC: x / WBC x   Sq Epi: x / Non Sq Epi: x / Bacteria: x         Doing ok hb stable  x1 brbpr yesterday.    OBJECTIVE:    VITAL SIGNS:  ICU Vital Signs Last 24 Hrs  T(C): 37.1 (02 Dec 2024 03:52), Max: 37.1 (02 Dec 2024 00:03)  T(F): 98.8 (02 Dec 2024 03:52), Max: 98.8 (02 Dec 2024 03:52)  HR: 80 (02 Dec 2024 03:52) (78 - 96)  BP: 125/61 (02 Dec 2024 03:52) (125/61 - 166/68)  BP(mean): --  ABP: --  ABP(mean): --  RR: 18 (02 Dec 2024 03:52) (18 - 18)  SpO2: 97% (02 Dec 2024 03:52) (95% - 99%)    O2 Parameters below as of 02 Dec 2024 03:52  Patient On (Oxygen Delivery Method): room air              11-30 @ 07:01  -  12-01 @ 07:00  --------------------------------------------------------  IN: 1490 mL / OUT: 1200 mL / NET: 290 mL    12-01 @ 07:01  -  12-02 @ 05:22  --------------------------------------------------------  IN: 630 mL / OUT: 400 mL / NET: 230 mL        PHYSICAL EXAM:    General: NAD  HEENT: NC/AT  Neck: supple  Respiratory: Regular RR, no increase in WOB  Cardiovascular: RRR  Abdomen: soft, NT/ND; +BS x4  Extremities: WWP, 2+ peripheral pulses b/l  Skin: normal color and turgor; no rash  Neurological: A&OX    MEDICATIONS:  MEDICATIONS  (STANDING):  allopurinol 300 milliGRAM(s) Oral <User Schedule>  atorvastatin 80 milliGRAM(s) Oral at bedtime  dextrose 5%. 1000 milliLiter(s) (100 mL/Hr) IV Continuous <Continuous>  dextrose 5%. 1000 milliLiter(s) (50 mL/Hr) IV Continuous <Continuous>  dextrose 50% Injectable 25 Gram(s) IV Push once  dextrose 50% Injectable 12.5 Gram(s) IV Push once  dextrose 50% Injectable 25 Gram(s) IV Push once  dextrose Oral Gel 15 Gram(s) Oral once  glucagon  Injectable 1 milliGRAM(s) IntraMuscular once  insulin lispro (ADMELOG) corrective regimen sliding scale   SubCutaneous three times a day before meals  insulin lispro (ADMELOG) corrective regimen sliding scale   SubCutaneous at bedtime  levothyroxine 75 MICROGram(s) Oral daily    MEDICATIONS  (PRN):  acetaminophen     Tablet .. 650 milliGRAM(s) Oral every 6 hours PRN Temp greater or equal to 38C (100.4F), Mild Pain (1 - 3)      ALLERGIES:  Allergies    No Known Allergies    Intolerances        LABS:                        9.2    10.49 )-----------( 138      ( 01 Dec 2024 21:58 )             28.0     12-01    141  |  110[H]  |  25[H]  ----------------------------<  129[H]  4.2   |  22  |  1.35[H]    Ca    7.9[L]      01 Dec 2024 05:01  Phos  2.3     12-01  Mg     1.9     12-01    TPro  4.7[L]  /  Alb  2.9[L]  /  TBili  0.9  /  DBili  x   /  AST  17  /  ALT  14  /  AlkPhos  54  12-01      Urinalysis Basic - ( 01 Dec 2024 05:01 )    Color: x / Appearance: x / SG: x / pH: x  Gluc: 129 mg/dL / Ketone: x  / Bili: x / Urobili: x   Blood: x / Protein: x / Nitrite: x   Leuk Esterase: x / RBC: x / WBC x   Sq Epi: x / Non Sq Epi: x / Bacteria: x

## 2024-12-02 NOTE — PROGRESS NOTE ADULT - PROBLEM SELECTOR PLAN 1
CTA Abdomen/Pelvis w/ IV contrast showing area of active extravasation within the proximal sigmoid colon.  Hb on arrival to [facility] 10.4   Last colon >10 years ago  Now resolved, likely diverticular bleed     - GI consulted- planning colonoscopy on Tuesday 12/3 as per patient's request   - IR consulted- medical management for now   - NPO after midnight   - Transfuse for Hb <7 or if patient is actively bleeding and symptomatic CTA Abdomen/Pelvis w/ IV contrast showing area of active extravasation within the proximal sigmoid colon.  Hb on arrival to [facility] 10.4   Last colon >10 years ago  Now resolved, likely diverticular bleed     - GI consulted- planning colonoscopy on Tuesday 12/3 as per patient's request   - IR consulted- medical management for now   - NPO after midnight, CLD today  - CBC q12h, Transfuse for Hb <8 or if patient is actively bleeding and symptomatic

## 2024-12-02 NOTE — PROGRESS NOTE ADULT - ASSESSMENT
Ms Berg is a 89yr old F with pmh significant for TIA on plavix, HTN, DM, CKD (baseline 1.5-1.6). She presented with cc of Brbpr x 1 day. patient reports that on friday, she had watery bm that was mostly blood and bright red, filled the toilet bowl. x 3 episodes.    Assessment    BRBPR    Patient with active brbpr, ct abd with active etravasation in sigmoid. Differentials - Diverticular bleed r/o ulcer, angiectasia, malignancy. Unlikely rapid upper GI given hemodynamic stability, stable Bun/cr ratio in addition to Ct findings.     hb today stable. bp wnl      Recommendation.        -Monitor hb and transfuse with goal hb of >7  -Monitor Vitals  -Trend bm  -Colonoscopy is low yield for diagnosing an active diverticular bleed. Given age, would preferably monitor patient especially if bleeding subsides. However had a significant drop in hemoglobin and from our conversation she wants to pursue colonoscopy. Would plan for procedure next week.  -Clear liquid on Monday.   -Hold Plavix    Cathi Hartmann, PGY4  Gastroenterology and Hepatology  Available on TEAMS    For non urgent consult, please email [email or web address] (For [place]) or [email or web address] (For [place])  Long range page number [phone]. Short range [phone]     Ms Berg is a 89yr old F with pmh significant for TIA on plavix, HTN, DM, CKD (baseline 1.5-1.6). She presented with cc of Brbpr x 1 day. patient reports that on friday, she had watery bm that was mostly blood and bright red, filled the toilet bowl. x 3 episodes.    Assessment    BRBPR    Patient with active brbpr, ct abd with active etravasation in sigmoid. Differentials - Diverticular bleed r/o ulcer, angiectasia, malignancy. Unlikely rapid upper GI given hemodynamic stability, stable Bun/cr ratio in addition to Ct findings.     hb today stable. bp wnl      Recommendation.        -Monitor hb and transfuse with goal hb of >7  -Monitor Vitals  -Trend bm  -Colonoscopy is low yield for diagnosing an active diverticular bleed. Given age, would preferably monitor patient especially if bleeding subsides. However had a significant drop in hemoglobin and from our conversation she wants to pursue colonoscopy. Would plan for procedure tomorrow  -Clear liquid today. Npo after midnight  -Hold Plavix    Cathi Hartmann, PGY4  Gastroenterology and Hepatology  Available on TEAMS    For non urgent consult, please email [email or web address] (For [place]) or [email or web address] (For [place])  Long range page number [phone]. Short range [phone]     Ms Berg is a 89yr old F with pmh significant for TIA on plavix, HTN, DM, CKD (baseline 1.5-1.6). She presented with cc of Brbpr x 1 day. patient reports that on friday, she had watery bm that was mostly blood and bright red, filled the toilet bowl. x 3 episodes.    Assessment    BRBPR    Patient with active brbpr, ct abd with active extravasation in sigmoid. Differentials - Diverticular bleed r/o ulcer, angiectasia, malignancy. Unlikely rapid upper GI given hemodynamic stability, stable Bun/cr ratio in addition to Ct findings.     hb today stable. bp wnl      Recommendation.        -Monitor hb and transfuse with goal hb of >7  -Monitor Vitals  -Trend bm  -Colonoscopy is low yield for diagnosing an active diverticular bleed. Given age, would preferably monitor patient especially if bleeding subsides. However had a significant drop in hemoglobin and from our conversation she wants to pursue colonoscopy. Would plan for procedure tomorrow  -Clear liquid today. Npo after midnight  -please get an echo  -Hold Plavix    Cathi Hartmann, PGY4  Gastroenterology and Hepatology  Available on TEAMS    For non urgent consult, please email [email or web address] (For [place]) or [email or web address] (For [place])  Long range page number [phone]. Short range [phone]     Ms Berg is a 89yr old F with pmh significant for TIA on plavix, HTN, DM, CKD (baseline 1.5-1.6). She presented with cc of Brbpr x 1 day. patient reports that on friday, she had watery bm that was mostly blood and bright red, filled the toilet bowl. x 3 episodes.    Assessment    BRBPR    Patient with active brbpr, ct abd with active extravasation in sigmoid. Differentials - Diverticular bleed r/o ulcer, angiectasia, malignancy. Unlikely rapid upper GI given hemodynamic stability, stable Bun/cr ratio in addition to Ct findings.     hb today stable. bp wnl      Recommendation.        -Monitor hb and transfuse with goal hb of >7  -Monitor Vitals  -Trend bm  -Colonoscopy is low yield for diagnosing an active diverticular bleed. Given age, would preferably monitor patient especially if bleeding subsides. However had a significant drop in hemoglobin and from our conversation she wants to pursue colonoscopy. Would plan for procedure tomorrow  -Clear liquid today. Npo after midnight  -please get an echo      Onopal Harmtann, PGY4  Gastroenterology and Hepatology  Available on TEAMS    For non urgent consult, please email [email or web address] (For [place]) or [email or web address] (For [place])  Long range page number [phone]. Short range [phone]

## 2024-12-02 NOTE — PROGRESS NOTE ADULT - ASSESSMENT
89F w/ PMH of TIAs iso carotid artery stenosis on Plavix, T2DM, GERD, hypothyroidism and HTN presented w/ 2 days of BRBPR.  89F w/ PMH of TIAs iso carotid artery stenosis on Plavix, T2DM, GERD, hypothyroidism and HTN presented w/ 2 days of BRBPR. Planned for colonoscopy 12/3.

## 2024-12-02 NOTE — PHYSICAL THERAPY INITIAL EVALUATION ADULT - PERTINENT HX OF CURRENT PROBLEM, REHAB EVAL
89F w/ PMH of x TIAs on Plavix, T2DM, GERD, hypothyroidism and HTN presented w/ 2 days of BRBPR  CTA abdomen/pelvis: Area of active extravasation within the proximal sigmoid colon.  Atrophic pancreas with interval development of multiple cystic lesions   throughout the anchors measuring up to 1.9 cm. Consider one-year   follow-up with contrast-enhanced MRI or pancreatic protocol CT as  clinically indicated.

## 2024-12-02 NOTE — PROGRESS NOTE ADULT - ATTENDING COMMENTS
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 Initially presented to [facility] where CTA abdomen was positive for proximal sigmoid bleed. She was transferred to [facility] for IR evaluation. patient has received PRBC transfusions.      1. LGIB, likely diverticular  2. TIA with carotis artery stenosis  3. HTN  4. IDDM2    - No MT bleed today, afebrile, no abdominal pain, Hb 9.0 ( had total 2u PRBC transfusion since admission ), BP is stable  - Reviewed CTA abdomen done in [facility]- extravasation in proximal sigmoid   - Off Plavix. GI plans noted- colonoscopy tomorrow  - CBC q 12 hrs, PRBC transfusions if Hb <8.0  - BP has been stable off labetalol and Amlodipine   - PT eval  - SCD  - Time spent 45 min excluding house staff teaching.

## 2024-12-02 NOTE — PHYSICAL THERAPY INITIAL EVALUATION ADULT - GAIT TRAINING, PT EVAL
GOAL: Patient will ambulate 300 feet independently with RW in 2 weeks in order to return home safely.

## 2024-12-02 NOTE — PROGRESS NOTE ADULT - PROBLEM SELECTOR PLAN 2
Iso of LGIB, Hb on arrival to [facility] 10.4   Continues to have active hematochezia   s/p 1u pRBC at OSH; s/p 1u pRBC overnight 12/1   LGIB now resolved    - Trend and monitor CBC q8h   - f/u repeat CBC- latest Hb 8.7   - maintain active type & screen  - Transfuse for Hb <7 or if patient is actively bleeding and symptomatic  - Plan for colonoscopy w/ GI 12/3 Iso of LGIB, Hb on arrival to [facility] 10.4   Continues to have active hematochezia   s/p 1u pRBC at OSH; s/p 1u pRBC overnight 12/1   LGIB now resolved    - Trend and monitor CBC q12h   - maintain active type & screen  - Transfuse for Hb <8 or if patient is actively bleeding and symptomatic  - Plan for colonoscopy w/ GI 12/3

## 2024-12-02 NOTE — PHYSICAL THERAPY INITIAL EVALUATION ADULT - ADDITIONAL COMMENTS
Pt lives in a house with her daughter, 2 steps to enter and lives on first floor of the house. Pt uses RW for functional mobility prior to admission and is independent with ADLs, has help as needed from her daughter. Pt owns adjustable bed, RW, toilet seat riser and reports she has not showered in years but uses adult wipes daily for bathing.

## 2024-12-02 NOTE — PROGRESS NOTE ADULT - SUBJECTIVE AND OBJECTIVE BOX
Patient is a 89y old  Female who presents with a chief complaint of BRBPR (02 Dec 2024 05:21)      ======Overnight/Subjective======  Overnight- NAEON    Subjective    Brief daily plan- clear liquid diet today, NPO after midnight for colonoscopy tomorrow as per patient's request     ======Medications======  MEDICATIONS  (STANDING):  allopurinol 300 milliGRAM(s) Oral <User Schedule>  atorvastatin 80 milliGRAM(s) Oral at bedtime  dextrose 5%. 1000 milliLiter(s) (100 mL/Hr) IV Continuous <Continuous>  dextrose 5%. 1000 milliLiter(s) (50 mL/Hr) IV Continuous <Continuous>  dextrose 50% Injectable 25 Gram(s) IV Push once  dextrose 50% Injectable 12.5 Gram(s) IV Push once  dextrose 50% Injectable 25 Gram(s) IV Push once  dextrose Oral Gel 15 Gram(s) Oral once  glucagon  Injectable 1 milliGRAM(s) IntraMuscular once  insulin lispro (ADMELOG) corrective regimen sliding scale   SubCutaneous three times a day before meals  insulin lispro (ADMELOG) corrective regimen sliding scale   SubCutaneous at bedtime  levothyroxine 75 MICROGram(s) Oral daily    MEDICATIONS  (PRN):  acetaminophen     Tablet .. 650 milliGRAM(s) Oral every 6 hours PRN Temp greater or equal to 38C (100.4F), Mild Pain (1 - 3)      ======Vital Signs======  T(C): 37.1 (24 @ 03:52), Max: 37.1 (24 @ 00:03)  T(F): 98.8 (24 @ 03:52), Max: 98.8 (24 @ 03:52)  HR: 80 (24 @ 03:52) (78 - 96)  BP: 125/61 (24 @ 03:52) (125/61 - 166/68)  BP(mean): --  RR: 18 (24 @ 03:52) (18 - 18)  SpO2: 97% (24 @ 03:52) (95% - 99%)    ======Physical exams======  GENERAL: NAD  Cardiovascular: RRR, S1 S2, no m/r/g, no JVD  LUNGS: Unlabored respiration, CTABL  ABDOMEN: Soft, NTND  EXTREMITIES: Warm extremities, no edema, peripheral pulses 2+ bilaterally  NEURO: AAOx3, PERRLA    ======Labs======                9.0[L]  8.69 >----------< 141[L]  (MCV: 91.6)                27.3[L]   142 | 109[H] | 22  -----------------------< 126[H]  4.3 | 22 | 1.32[H]    TPro: 4.7[L] / Alb: 2.9[L] / TBili: 0.9 / DBili: -- / AlkPhos: 54 / ALT: 14 / AST: 17 (24 @ 05:01)  Ca: 8.1[L] / Phos: 2.8 / M.9 (24 @ 05:50)    Gas: 7.38 / 39 / 48[H] / 23 / 81.3% / -1.8 (24 @ 03:50)      ======Microbiology======  Urinalysis Basic - ( 02 Dec 2024 05:50 )    Color: x / Appearance: x / SG: x / pH: x  Gluc: 126 mg/dL / Ketone: x  / Bili: x / Urobili: x   Blood: x / Protein: x / Nitrite: x   Leuk Esterase: x / RBC: x / WBC x   Sq Epi: x / Non Sq Epi: x / Bacteria: x          ======I&O's======  I&O's Summary    01 Dec 2024 07:01  -  02 Dec 2024 07:00  --------------------------------------------------------  IN: 630 mL / OUT: 400 mL / NET: 230 mL         Patient is a 89y old  Female who presents with a chief complaint of BRBPR (02 Dec 2024 05:21)      ======Overnight/Subjective======  Overnight- NAEON    Subjective- pt seen and examined at bedside this AM. She reports feeling well, waiting for her procedure tomorrow. last bloody BM yesterday, nothing overnight. Denies any chest pain, SOB, headaches, dizziness, palpitations, lightheadedness, visual disturbances, n/v, diarrhea, constipation, abdominal pain, melena, dysuria.     Brief daily plan- clear liquid diet today, NPO after midnight for colonoscopy tomorrow as per patient's request     ======Medications======  MEDICATIONS  (STANDING):  allopurinol 300 milliGRAM(s) Oral <User Schedule>  atorvastatin 80 milliGRAM(s) Oral at bedtime  dextrose 5%. 1000 milliLiter(s) (100 mL/Hr) IV Continuous <Continuous>  dextrose 5%. 1000 milliLiter(s) (50 mL/Hr) IV Continuous <Continuous>  dextrose 50% Injectable 25 Gram(s) IV Push once  dextrose 50% Injectable 12.5 Gram(s) IV Push once  dextrose 50% Injectable 25 Gram(s) IV Push once  dextrose Oral Gel 15 Gram(s) Oral once  glucagon  Injectable 1 milliGRAM(s) IntraMuscular once  insulin lispro (ADMELOG) corrective regimen sliding scale   SubCutaneous three times a day before meals  insulin lispro (ADMELOG) corrective regimen sliding scale   SubCutaneous at bedtime  levothyroxine 75 MICROGram(s) Oral daily    MEDICATIONS  (PRN):  acetaminophen     Tablet .. 650 milliGRAM(s) Oral every 6 hours PRN Temp greater or equal to 38C (100.4F), Mild Pain (1 - 3)      ======Vital Signs======  T(C): 37.1 (24 @ 03:52), Max: 37.1 (24 @ 00:03)  T(F): 98.8 (24 @ 03:52), Max: 98.8 (24 @ 03:52)  HR: 80 (24 @ 03:52) (78 - 96)  BP: 125/61 (24 @ 03:52) (125/61 - 166/68)  BP(mean): --  RR: 18 (24 @ 03:52) (18 - 18)  SpO2: 97% (24 @ 03:52) (95% - 99%)    ======Physical exams======    GENERAL: NAD, lying in bed comfortably  HEAD:  Atraumatic, normocephalic  EYES: EOMI, PERRLA, conjunctiva and sclera clear  ENT: Moist mucous membranes  NECK: Supple, no JVD  HEART: Regular rate and rhythm, no murmurs, rubs, or gallops  LUNGS: Unlabored respirations.  Clear to auscultation bilaterally, no crackles, wheezing, or rhonchi  ABDOMEN: Soft, nontender, nondistended, +BS  EXTREMITIES: 2+ peripheral pulses bilaterally. No clubbing, cyanosis, LLE 1+ edema   NERVOUS SYSTEM:  A&Ox3, no focal deficits   SKIN: No rashes or lesions, some bruising noted on left arm    ======Labs======                9.0[L]  8.69 >----------< 141[L]  (MCV: 91.6)                27.3[L]   142 | 109[H] | 22  -----------------------< 126[H]  4.3 | 22 | 1.32[H]    TPro: 4.7[L] / Alb: 2.9[L] / TBili: 0.9 / DBili: -- / AlkPhos: 54 / ALT: 14 / AST: 17 (24 @ 05:01)  Ca: 8.1[L] / Phos: 2.8 / M.9 (24 @ 05:50)    Gas: 7.38 / 39 / 48[H] / 23 / 81.3% / -1.8 (24 @ 03:50)    ======Microbiology======  Urinalysis Basic - ( 02 Dec 2024 05:50 )    Color: x / Appearance: x / SG: x / pH: x  Gluc: 126 mg/dL / Ketone: x  / Bili: x / Urobili: x   Blood: x / Protein: x / Nitrite: x   Leuk Esterase: x / RBC: x / WBC x   Sq Epi: x / Non Sq Epi: x / Bacteria: x    ======I&O's======  I&O's Summary    01 Dec 2024 07:01  -  02 Dec 2024 07:00  --------------------------------------------------------  IN: 630 mL / OUT: 400 mL / NET: 230 mL

## 2024-12-03 LAB
ANION GAP SERPL CALC-SCNC: 12 MMOL/L — SIGNIFICANT CHANGE UP (ref 5–17)
BLD GP AB SCN SERPL QL: NEGATIVE — SIGNIFICANT CHANGE UP
BUN SERPL-MCNC: 19 MG/DL — SIGNIFICANT CHANGE UP (ref 7–23)
CALCIUM SERPL-MCNC: 9 MG/DL — SIGNIFICANT CHANGE UP (ref 8.4–10.5)
CHLORIDE SERPL-SCNC: 109 MMOL/L — HIGH (ref 96–108)
CO2 SERPL-SCNC: 23 MMOL/L — SIGNIFICANT CHANGE UP (ref 22–31)
CREAT SERPL-MCNC: 1.33 MG/DL — HIGH (ref 0.5–1.3)
EGFR: 36 ML/MIN/1.73M2 — LOW
GLUCOSE BLDC GLUCOMTR-MCNC: 121 MG/DL — HIGH (ref 70–99)
GLUCOSE BLDC GLUCOMTR-MCNC: 136 MG/DL — HIGH (ref 70–99)
GLUCOSE BLDC GLUCOMTR-MCNC: 137 MG/DL — HIGH (ref 70–99)
GLUCOSE BLDC GLUCOMTR-MCNC: 180 MG/DL — HIGH (ref 70–99)
GLUCOSE SERPL-MCNC: 121 MG/DL — HIGH (ref 70–99)
HCT VFR BLD CALC: 31.2 % — LOW (ref 34.5–45)
HGB BLD-MCNC: 10.1 G/DL — LOW (ref 11.5–15.5)
MAGNESIUM SERPL-MCNC: 2 MG/DL — SIGNIFICANT CHANGE UP (ref 1.6–2.6)
MCHC RBC-ENTMCNC: 29.9 PG — SIGNIFICANT CHANGE UP (ref 27–34)
MCHC RBC-ENTMCNC: 32.4 G/DL — SIGNIFICANT CHANGE UP (ref 32–36)
MCV RBC AUTO: 92.3 FL — SIGNIFICANT CHANGE UP (ref 80–100)
NRBC # BLD: 0 /100 WBCS — SIGNIFICANT CHANGE UP (ref 0–0)
PHOSPHATE SERPL-MCNC: 3.1 MG/DL — SIGNIFICANT CHANGE UP (ref 2.5–4.5)
PLATELET # BLD AUTO: 183 K/UL — SIGNIFICANT CHANGE UP (ref 150–400)
POTASSIUM SERPL-MCNC: 4.2 MMOL/L — SIGNIFICANT CHANGE UP (ref 3.5–5.3)
POTASSIUM SERPL-SCNC: 4.2 MMOL/L — SIGNIFICANT CHANGE UP (ref 3.5–5.3)
RBC # BLD: 3.38 M/UL — LOW (ref 3.8–5.2)
RBC # FLD: 15.4 % — HIGH (ref 10.3–14.5)
RH IG SCN BLD-IMP: POSITIVE — SIGNIFICANT CHANGE UP
SODIUM SERPL-SCNC: 144 MMOL/L — SIGNIFICANT CHANGE UP (ref 135–145)
WBC # BLD: 6.83 K/UL — SIGNIFICANT CHANGE UP (ref 3.8–10.5)
WBC # FLD AUTO: 6.83 K/UL — SIGNIFICANT CHANGE UP (ref 3.8–10.5)

## 2024-12-03 PROCEDURE — 99232 SBSQ HOSP IP/OBS MODERATE 35: CPT

## 2024-12-03 PROCEDURE — 45378 DIAGNOSTIC COLONOSCOPY: CPT | Mod: GC

## 2024-12-03 DEVICE — NET RETRV ROT ROTH 2.5MMX230CM: Type: IMPLANTABLE DEVICE | Status: FUNCTIONAL

## 2024-12-03 RX ADMIN — ALLOPURINOL 300 MILLIGRAM(S): 300 TABLET ORAL at 21:51

## 2024-12-03 RX ADMIN — Medication 75 MICROGRAM(S): at 04:59

## 2024-12-03 RX ADMIN — Medication 80 MILLIGRAM(S): at 21:51

## 2024-12-03 NOTE — PROGRESS NOTE ADULT - PROBLEM SELECTOR PLAN 3
Hx of b/l carotid artery stenosis   Shared decision making previously to medically manage w/ Plavix instead of surgical interventions due to her age and having the TIA while on both ASA and statin    TTE: 12/2: LVEF 74%, no other imaging abnormalities     - Hold Plavix iso LGIB   - c/w atorvastatin 80mg qd

## 2024-12-03 NOTE — PROGRESS NOTE ADULT - PROBLEM SELECTOR PLAN 2
Iso of LGIB, Hb on arrival to [facility] 10.4   Continues to have active hematochezia   s/p 1u pRBC at OSH; s/p 1u pRBC overnight 12/1   LGIB now resolved    - Trend and monitor CBC q12h   - maintain active type & screen  - Transfuse for Hb <8 or if patient is actively bleeding and symptomatic  - Colonoscopy w/ GI 12/3

## 2024-12-03 NOTE — PRE PROCEDURE NOTE - PRE PROCEDURE EVALUATION
Attending Physician:     Michael Dyer                       Procedure:  Colonoscopy +/- EGD    Indication for Procedure:  Brbpr  ________________________________________________________  PAST MEDICAL & SURGICAL HISTORY:  Hypertension      Diabetes      Hyperlipidemia, unspecified hyperlipidemia type      Gout      Hypothyroidism, unspecified type      Gastroesophageal reflux disease, esophagitis presence not specified      Carotid artery stenosis      Transient ischemic attack      Chronic kidney disease, unspecified CKD stage      S/P Tonsillectomy and Adenoidectomy      S/P Tonsillectomy and Adenoidectomy      Paraumbilical hernia      Osteoarthritis of right hip, unspecified osteoarthritis type  partial Right Hip Replacement 2017        ALLERGIES:  No Known Allergies    HOME MEDICATIONS:  allopurinol 300 mg oral tablet: 1 tab(s) orally every other day  amLODIPine 5 mg oral tablet: 2 tab(s) orally once a day  atorvastatin 80 mg oral tablet: 1 tab(s) orally once a day  furosemide 40 mg oral tablet: 1 tab(s) orally once a day  labetalol 200 mg oral tablet: 1 tab(s) orally once a day  Lantus Solostar Pen 100 units/mL subcutaneous solution: 10 unit(s) in am  levothyroxine 75 mcg (0.075 mg) oral tablet: 1 tab(s) orally once a day  Plavix 75 mg oral tablet: 1 tab(s) orally once a day    AICD/PPM: [ ] yes   [ ] no    PERTINENT LAB DATA:                        10.1   6.83  )-----------( 183      ( 03 Dec 2024 07:08 )             31.2     12-03    144  |  109[H]  |  19  ----------------------------<  121[H]  4.2   |  23  |  1.33[H]    Ca    9.0      03 Dec 2024 07:13  Phos  3.1     12-03  Mg     2.0     12-03                  PHYSICAL EXAMINATION:    Height (cm): 160T(C): 36.4  HR: 78  BP: 148/67  RR: 16  SpO2: 99%  See physical exam under H&P/Progress note      COMMENTS:  Discussed with patient/HCP risks of endoscopy/colonoscopy which include but are not limited to: risks of perforation, infection and bleeding. Risks also include risks of missed lesions. Patient/HCP would like to proceed at this time.     The patient is a suitable candidate for the planned procedure unless box checked [ ]  No, explain:       Attending Physician:     Michael Dyer                       Procedure:  Colonoscopy +/- EGD    Indication for Procedure:  Brbpr  ________________________________________________________  PAST MEDICAL & SURGICAL HISTORY:  Hypertension      Diabetes      Hyperlipidemia, unspecified hyperlipidemia type      Gout      Hypothyroidism, unspecified type      Gastroesophageal reflux disease, esophagitis presence not specified      Carotid artery stenosis      Transient ischemic attack      Chronic kidney disease, unspecified CKD stage      S/P Tonsillectomy and Adenoidectomy      S/P Tonsillectomy and Adenoidectomy      Paraumbilical hernia      Osteoarthritis of right hip, unspecified osteoarthritis type  partial Right Hip Replacement 2017        ALLERGIES:  No Known Allergies    HOME MEDICATIONS:  allopurinol 300 mg oral tablet: 1 tab(s) orally every other day  amLODIPine 5 mg oral tablet: 2 tab(s) orally once a day  atorvastatin 80 mg oral tablet: 1 tab(s) orally once a day  furosemide 40 mg oral tablet: 1 tab(s) orally once a day  labetalol 200 mg oral tablet: 1 tab(s) orally once a day  Lantus Solostar Pen 100 units/mL subcutaneous solution: 10 unit(s) in am  levothyroxine 75 mcg (0.075 mg) oral tablet: 1 tab(s) orally once a day  Plavix 75 mg oral tablet: 1 tab(s) orally once a day    AICD/PPM: [ ] yes   [x ] no    PERTINENT LAB DATA:                        10.1   6.83  )-----------( 183      ( 03 Dec 2024 07:08 )             31.2     12-03    144  |  109[H]  |  19  ----------------------------<  121[H]  4.2   |  23  |  1.33[H]    Ca    9.0      03 Dec 2024 07:13  Phos  3.1     12-03  Mg     2.0     12-03                  PHYSICAL EXAMINATION:    Height (cm): 160T(C): 36.4  HR: 78  BP: 148/67  RR: 16  SpO2: 99%  See physical exam under H&P/Progress note      COMMENTS:  Discussed with patient/HCP risks of endoscopy/colonoscopy which include but are not limited to: risks of perforation, infection and bleeding. Risks also include risks of missed lesions. Patient/HCP would like to proceed at this time.     The patient is a suitable candidate for the planned procedure unless box checked [ ]  No, explain:

## 2024-12-03 NOTE — PROGRESS NOTE ADULT - SUBJECTIVE AND OBJECTIVE BOX
Patient is a 89y old  Female who presents with a chief complaint of BRBPR (02 Dec 2024 07:40)      ======Overnight/Subjective======  Overnight    Subjective    Brief daily plan    ======Medications======  MEDICATIONS  (STANDING):  allopurinol 300 milliGRAM(s) Oral <User Schedule>  atorvastatin 80 milliGRAM(s) Oral at bedtime  dextrose 5%. 1000 milliLiter(s) (100 mL/Hr) IV Continuous <Continuous>  dextrose 5%. 1000 milliLiter(s) (50 mL/Hr) IV Continuous <Continuous>  dextrose 50% Injectable 25 Gram(s) IV Push once  dextrose 50% Injectable 12.5 Gram(s) IV Push once  dextrose 50% Injectable 25 Gram(s) IV Push once  dextrose Oral Gel 15 Gram(s) Oral once  glucagon  Injectable 1 milliGRAM(s) IntraMuscular once  insulin lispro (ADMELOG) corrective regimen sliding scale   SubCutaneous every 6 hours  levothyroxine 75 MICROGram(s) Oral daily    MEDICATIONS  (PRN):  acetaminophen     Tablet .. 650 milliGRAM(s) Oral every 6 hours PRN Temp greater or equal to 38C (100.4F), Mild Pain (1 - 3)      ======Vital Signs======  T(C): 36.4 (24 @ 05:01), Max: 37.3 (24 @ 20:15)  T(F): 97.6 (24 @ 05:01), Max: 99.2 (24 @ 20:15)  HR: 77 (24 @ 05:01) (74 - 89)  BP: 114/66 (24 @ 05:01) (114/66 - 164/68)  BP(mean): --  RR: 16 (24 @ 05:01) (16 - 18)  SpO2: 97% (24 @ 05:01) (96% - 98%)    ======Physical exams======  GENERAL: NAD  Cardiovascular: RRR, S1 S2, no m/r/g, no JVD  LUNGS: Unlabored respiration, CTABL  ABDOMEN: Soft, NTND  EXTREMITIES: Warm extremities, no edema, peripheral pulses 2+ bilaterally  NEURO: AAOx3, PERRLA    ======Labs======                10.1[L]  6.83 >----------< 183  (MCV: 92.3)                31.2[L]   142 | 109[H] | 22  -----------------------< 126[H]  4.3 | 22 | 1.32[H]    TPro: 4.7[L] / Alb: 2.9[L] / TBili: 0.9 / DBili: -- / AlkPhos: 54 / ALT: 14 / AST: 17 (24 @ 05:01)  Ca: 8.1[L] / Phos: 2.8 / M.9 (24 @ 05:50)    Gas: 7.38 / 39 / 48[H] / 23 / 81.3% / -1.8 (24 @ 03:50)      ======Microbiology======  Urinalysis Basic - ( 02 Dec 2024 05:50 )    Color: x / Appearance: x / SG: x / pH: x  Gluc: 126 mg/dL / Ketone: x  / Bili: x / Urobili: x   Blood: x / Protein: x / Nitrite: x   Leuk Esterase: x / RBC: x / WBC x   Sq Epi: x / Non Sq Epi: x / Bacteria: x          ======I&O's======  I&O's Summary    02 Dec 2024 07:01  -  03 Dec 2024 07:00  --------------------------------------------------------  IN: 1020 mL / OUT: 1400 mL / NET: -380 mL         Patient is a 89y old  Female who presents with a chief complaint of BRBPR (02 Dec 2024 07:40)      ======Overnight/Subjective======  Overnight- NAEON    Subjective- pt seen and examined at bedside this AM. reports feeling well, ready for her colonoscopy. bloody BMs after bowel prep yesterday. Denies any chest pain, SOB, headaches, dizziness, palpitations, lightheadedness, visual disturbances, n/v, diarrhea, constipation, abdominal pain, melena, dysuria.     Brief daily plan- colonoscopy today     ======Medications======  MEDICATIONS  (STANDING):  allopurinol 300 milliGRAM(s) Oral <User Schedule>  atorvastatin 80 milliGRAM(s) Oral at bedtime  dextrose 5%. 1000 milliLiter(s) (100 mL/Hr) IV Continuous <Continuous>  dextrose 5%. 1000 milliLiter(s) (50 mL/Hr) IV Continuous <Continuous>  dextrose 50% Injectable 25 Gram(s) IV Push once  dextrose 50% Injectable 12.5 Gram(s) IV Push once  dextrose 50% Injectable 25 Gram(s) IV Push once  dextrose Oral Gel 15 Gram(s) Oral once  glucagon  Injectable 1 milliGRAM(s) IntraMuscular once  insulin lispro (ADMELOG) corrective regimen sliding scale   SubCutaneous every 6 hours  levothyroxine 75 MICROGram(s) Oral daily    MEDICATIONS  (PRN):  acetaminophen     Tablet .. 650 milliGRAM(s) Oral every 6 hours PRN Temp greater or equal to 38C (100.4F), Mild Pain (1 - 3)      ======Vital Signs======  T(C): 36.4 (24 @ 05:01), Max: 37.3 (24 @ 20:15)  T(F): 97.6 (24 @ 05:01), Max: 99.2 (24 @ 20:15)  HR: 77 (24 @ 05:01) (74 - 89)  BP: 114/66 (24 @ 05:01) (114/66 - 164/68)  BP(mean): --  RR: 16 (24 @ 05:01) (16 - 18)  SpO2: 97% (24 @ 05:01) (96% - 98%)    ======Physical exams======    GENERAL: NAD, lying in bed comfortably  HEAD:  Atraumatic, normocephalic  EYES: EOMI, PERRLA, conjunctiva and sclera clear  ENT: Moist mucous membranes  NECK: Supple, no JVD  HEART: Regular rate and rhythm, no murmurs, rubs, or gallops  LUNGS: Unlabored respirations.  Clear to auscultation bilaterally, no crackles, wheezing, or rhonchi  ABDOMEN: Soft, nontender, nondistended, +BS  EXTREMITIES: 2+ peripheral pulses bilaterally. No clubbing, cyanosis, LLE 1+ edema   NERVOUS SYSTEM:  A&Ox3, no focal deficits   SKIN: No rashes or lesions, some bruising noted on left arm    ======Labs======                10.1[L]  6.83 >----------< 183  (MCV: 92.3)                31.2[L]   142 | 109[H] | 22  -----------------------< 126[H]  4.3 | 22 | 1.32[H]    TPro: 4.7[L] / Alb: 2.9[L] / TBili: 0.9 / DBili: -- / AlkPhos: 54 / ALT: 14 / AST: 17 (24 @ 05:01)  Ca: 8.1[L] / Phos: 2.8 / M.9 (24 @ 05:50)    Gas: 7.38 / 39 / 48[H] / 23 / 81.3% / -1.8 (24 @ 03:50)      ======Microbiology======  Urinalysis Basic - ( 02 Dec 2024 05:50 )    Color: x / Appearance: x / SG: x / pH: x  Gluc: 126 mg/dL / Ketone: x  / Bili: x / Urobili: x   Blood: x / Protein: x / Nitrite: x   Leuk Esterase: x / RBC: x / WBC x   Sq Epi: x / Non Sq Epi: x / Bacteria: x          ======I&O's======  I&O's Summary    02 Dec 2024 07:01  -  03 Dec 2024 07:00  --------------------------------------------------------  IN: 1020 mL / OUT: 1400 mL / NET: -380 mL

## 2024-12-03 NOTE — PROGRESS NOTE ADULT - ATTENDING COMMENTS
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 Initially presented to [facility] where CTA abdomen was positive for proximal sigmoid bleed. She was transferred to [facility] for IR evaluation. patient has received PRBC transfusions.      1. LGIB, likely diverticular  2. TIA with carotis artery stenosis  3. HTN  4. IDDM2    - Stable, vitals and Hb >10 today, No more AK bleed, afebrile, no abdominal pain,   - s/p total 2u PRBC transfusion since admission ), Off Plavix  - Reviewed CTA abdomen done in [facility]- extravasation in proximal sigmoid   - GI plans for colonoscopy today, will ask GI when to resume Plavix safely  - would do CBC q 24hrs at this point, PRBC transfusions if Hb <8.0  - BP fluctuates, adding Amlodipine 10mg/d and held labetalol   - PT eval  - SCD  - Time spent 44 min excluding house staff teaching.

## 2024-12-03 NOTE — PROGRESS NOTE ADULT - PROBLEM SELECTOR PLAN 1
CTA Abdomen/Pelvis w/ IV contrast showing area of active extravasation within the proximal sigmoid colon.  Hb on arrival to [facility] 10.4   Last colon >10 years ago  Now resolved, likely diverticular bleed     - GI consulted- colonoscopy on Tuesday 12/3 as per patient's request   - IR consulted- medical management for now   - NPO  - CBC q12h, Transfuse for Hb <8 or if patient is actively bleeding and symptomatic CTA Abdomen/Pelvis w/ IV contrast showing area of active extravasation within the proximal sigmoid colon.  Hb on arrival to [facility] 10.4   Last colon >10 years ago  Now resolved, likely diverticular bleed     - GI consulted- colonoscopy on Tuesday 12/3 as per patient's request   - IR consulted- medical management for now   - NPO, resume diet after   - CBC q12h, Transfuse for Hb <8 or if patient is actively bleeding and symptomatic

## 2024-12-03 NOTE — PROGRESS NOTE ADULT - ASSESSMENT
89F w/ PMH of TIAs iso carotid artery stenosis on Plavix, T2DM, GERD, hypothyroidism and HTN presented w/ 2 days of BRBPR. Planned for colonoscopy 12/3.

## 2024-12-04 ENCOUNTER — TRANSCRIPTION ENCOUNTER (OUTPATIENT)
Age: 88
End: 2024-12-04

## 2024-12-04 VITALS
DIASTOLIC BLOOD PRESSURE: 68 MMHG | OXYGEN SATURATION: 97 % | TEMPERATURE: 99 F | SYSTOLIC BLOOD PRESSURE: 150 MMHG | HEART RATE: 78 BPM | RESPIRATION RATE: 18 BRPM

## 2024-12-04 LAB
ANION GAP SERPL CALC-SCNC: 14 MMOL/L — SIGNIFICANT CHANGE UP (ref 5–17)
BASOPHILS # BLD AUTO: 0.04 K/UL — SIGNIFICANT CHANGE UP (ref 0–0.2)
BASOPHILS NFR BLD AUTO: 0.6 % — SIGNIFICANT CHANGE UP (ref 0–2)
BUN SERPL-MCNC: 16 MG/DL — SIGNIFICANT CHANGE UP (ref 7–23)
CALCIUM SERPL-MCNC: 8.6 MG/DL — SIGNIFICANT CHANGE UP (ref 8.4–10.5)
CHLORIDE SERPL-SCNC: 108 MMOL/L — SIGNIFICANT CHANGE UP (ref 96–108)
CO2 SERPL-SCNC: 19 MMOL/L — LOW (ref 22–31)
CREAT SERPL-MCNC: 1.29 MG/DL — SIGNIFICANT CHANGE UP (ref 0.5–1.3)
EGFR: 38 ML/MIN/1.73M2 — LOW
EOSINOPHIL # BLD AUTO: 0.6 K/UL — HIGH (ref 0–0.5)
EOSINOPHIL NFR BLD AUTO: 8.5 % — HIGH (ref 0–6)
GLUCOSE BLDC GLUCOMTR-MCNC: 127 MG/DL — HIGH (ref 70–99)
GLUCOSE BLDC GLUCOMTR-MCNC: 136 MG/DL — HIGH (ref 70–99)
GLUCOSE SERPL-MCNC: 113 MG/DL — HIGH (ref 70–99)
HCT VFR BLD CALC: 30.7 % — LOW (ref 34.5–45)
HGB BLD-MCNC: 9.9 G/DL — LOW (ref 11.5–15.5)
IMM GRANULOCYTES NFR BLD AUTO: 0.3 % — SIGNIFICANT CHANGE UP (ref 0–0.9)
LYMPHOCYTES # BLD AUTO: 0.7 K/UL — LOW (ref 1–3.3)
LYMPHOCYTES # BLD AUTO: 10 % — LOW (ref 13–44)
MAGNESIUM SERPL-MCNC: 1.9 MG/DL — SIGNIFICANT CHANGE UP (ref 1.6–2.6)
MCHC RBC-ENTMCNC: 29.8 PG — SIGNIFICANT CHANGE UP (ref 27–34)
MCHC RBC-ENTMCNC: 32.2 G/DL — SIGNIFICANT CHANGE UP (ref 32–36)
MCV RBC AUTO: 92.5 FL — SIGNIFICANT CHANGE UP (ref 80–100)
MONOCYTES # BLD AUTO: 0.68 K/UL — SIGNIFICANT CHANGE UP (ref 0–0.9)
MONOCYTES NFR BLD AUTO: 9.7 % — SIGNIFICANT CHANGE UP (ref 2–14)
NEUTROPHILS # BLD AUTO: 4.99 K/UL — SIGNIFICANT CHANGE UP (ref 1.8–7.4)
NEUTROPHILS NFR BLD AUTO: 70.9 % — SIGNIFICANT CHANGE UP (ref 43–77)
NRBC # BLD: 0 /100 WBCS — SIGNIFICANT CHANGE UP (ref 0–0)
PHOSPHATE SERPL-MCNC: 3.1 MG/DL — SIGNIFICANT CHANGE UP (ref 2.5–4.5)
PLATELET # BLD AUTO: 209 K/UL — SIGNIFICANT CHANGE UP (ref 150–400)
POTASSIUM SERPL-MCNC: 3.6 MMOL/L — SIGNIFICANT CHANGE UP (ref 3.5–5.3)
POTASSIUM SERPL-SCNC: 3.6 MMOL/L — SIGNIFICANT CHANGE UP (ref 3.5–5.3)
RBC # BLD: 3.32 M/UL — LOW (ref 3.8–5.2)
RBC # FLD: 15.1 % — HIGH (ref 10.3–14.5)
SODIUM SERPL-SCNC: 141 MMOL/L — SIGNIFICANT CHANGE UP (ref 135–145)
WBC # BLD: 7.03 K/UL — SIGNIFICANT CHANGE UP (ref 3.8–10.5)
WBC # FLD AUTO: 7.03 K/UL — SIGNIFICANT CHANGE UP (ref 3.8–10.5)

## 2024-12-04 PROCEDURE — 36415 COLL VENOUS BLD VENIPUNCTURE: CPT

## 2024-12-04 PROCEDURE — 82330 ASSAY OF CALCIUM: CPT

## 2024-12-04 PROCEDURE — P9016: CPT

## 2024-12-04 PROCEDURE — 84100 ASSAY OF PHOSPHORUS: CPT

## 2024-12-04 PROCEDURE — 86923 COMPATIBILITY TEST ELECTRIC: CPT

## 2024-12-04 PROCEDURE — 97161 PT EVAL LOW COMPLEX 20 MIN: CPT

## 2024-12-04 PROCEDURE — 85027 COMPLETE CBC AUTOMATED: CPT

## 2024-12-04 PROCEDURE — 99285 EMERGENCY DEPT VISIT HI MDM: CPT

## 2024-12-04 PROCEDURE — 82947 ASSAY GLUCOSE BLOOD QUANT: CPT

## 2024-12-04 PROCEDURE — 93306 TTE W/DOPPLER COMPLETE: CPT

## 2024-12-04 PROCEDURE — 82435 ASSAY OF BLOOD CHLORIDE: CPT

## 2024-12-04 PROCEDURE — 80048 BASIC METABOLIC PNL TOTAL CA: CPT

## 2024-12-04 PROCEDURE — 85610 PROTHROMBIN TIME: CPT

## 2024-12-04 PROCEDURE — 86901 BLOOD TYPING SEROLOGIC RH(D): CPT

## 2024-12-04 PROCEDURE — 84295 ASSAY OF SERUM SODIUM: CPT

## 2024-12-04 PROCEDURE — 99239 HOSP IP/OBS DSCHRG MGMT >30: CPT

## 2024-12-04 PROCEDURE — 85018 HEMOGLOBIN: CPT

## 2024-12-04 PROCEDURE — 80053 COMPREHEN METABOLIC PANEL: CPT

## 2024-12-04 PROCEDURE — 83735 ASSAY OF MAGNESIUM: CPT

## 2024-12-04 PROCEDURE — 85730 THROMBOPLASTIN TIME PARTIAL: CPT

## 2024-12-04 PROCEDURE — 84132 ASSAY OF SERUM POTASSIUM: CPT

## 2024-12-04 PROCEDURE — 82803 BLOOD GASES ANY COMBINATION: CPT

## 2024-12-04 PROCEDURE — 82962 GLUCOSE BLOOD TEST: CPT

## 2024-12-04 PROCEDURE — 83605 ASSAY OF LACTIC ACID: CPT

## 2024-12-04 PROCEDURE — 86850 RBC ANTIBODY SCREEN: CPT

## 2024-12-04 PROCEDURE — 83036 HEMOGLOBIN GLYCOSYLATED A1C: CPT

## 2024-12-04 PROCEDURE — 85014 HEMATOCRIT: CPT

## 2024-12-04 PROCEDURE — 86900 BLOOD TYPING SEROLOGIC ABO: CPT

## 2024-12-04 PROCEDURE — 36430 TRANSFUSION BLD/BLD COMPNT: CPT

## 2024-12-04 PROCEDURE — 85025 COMPLETE CBC W/AUTO DIFF WBC: CPT

## 2024-12-04 RX ORDER — FAMOTIDINE 20 MG/1
20 TABLET, FILM COATED ORAL
Refills: 0 | Status: DISCONTINUED | OUTPATIENT
Start: 2024-12-04 | End: 2024-12-04

## 2024-12-04 RX ADMIN — Medication 75 MICROGRAM(S): at 04:46

## 2024-12-04 NOTE — PROGRESS NOTE ADULT - PROBLEM SELECTOR PLAN 5
On home Lantus Solostar 10u qd     - Low dose ISS

## 2024-12-04 NOTE — PROGRESS NOTE ADULT - ATTENDING COMMENTS
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 Initially presented to [facility] where CTA abdomen was positive for proximal sigmoid bleed. She was transferred to [facility] for IR evaluation. patient has received PRBC transfusions.      1. LGIB, likely diverticular  2. TIA with carotis artery stenosis  3. HTN  4. IDDM2    - Eating lunch, no more GI bleed, vitals and Hb are stable, afebrile, no abdominal pain, daughter at bedside  - s/p colonoscopy yesterday- no active bleed during procedure, advanced diet  - s/p total 2u PRBC transfusion since admission ), will resume Plavix in 7 days from the last episode of LGIB, on 10th Dec  - Reviewed CTA abdomen done in [facility]- extravasation in proximal sigmoid   - [facility] Labetal[facility]    - PT rec outpatient PT  - d/c home today, outpatient f/u with GI and Neurology for further care in a week-10 days  - Advised to come back to ED if any further bleeding  - spoke to daughter at bedside  - Time spent 40 min excluding house staff teaching.

## 2024-12-04 NOTE — PROGRESS NOTE ADULT - PROBLEM SELECTOR PLAN 4
TIA likely iso carotid artery stenosis while being on ASA and statin 6 years ago   Being medically managed w/ Plavix (currently held) at home
TIA likely iso carotid artery stenosis 6 years ago   Being medically managed w/ Plavix (currently held) at home

## 2024-12-04 NOTE — PROGRESS NOTE ADULT - PROBLEM SELECTOR PLAN 9
-C/w Atorvastatin 80mg qd

## 2024-12-04 NOTE — DISCHARGE NOTE PROVIDER - HOSPITAL COURSE
HPI:  89F w/ PMH of x TIAs on Plavix, T2DM, GERD, hypothyroidism and HTN presented w/ 2 days of BRBPR. She said that she woke up the day after thanksgiving and used the restroom to urinate and saw a lot of blood in the toilet bowl. She has had more than 4 episodes since including one as the patient was being interviewed. She did not endorse any pain with the bowel movement or bleeding. Denies any current chest pain, SOB, headaches, dizziness, palpitations, lightheadedness, visual disturbances, n/v, diarrhea, constipation, abdominal pain, melena, dysuria. She initially presented w/ these symptoms to [facility] VS where she was found to have active extravasation at the proximal sigmoid colon on CTA abdomen/pelvis. She was given 1u pRBC and transferred to [facility] for IR/GI evaluation. She stated that her last colonoscopy was more than 10 years ago- shared decision making with her doctors to not continue with them going forward due to her age. She endorses that her brother passed away from colorectal cancer, father passed away from stomach cancer and sister passed away from pancreatic cancer. She was placed on Plavix after having a TIA 6 years ago iso b/l carotid artery stenosis. She also mentioned that she was previously hospitalized a long time ago w/ kidney failure however she does not remember what caused the failure. Labs in the [facility] ED remarkable for Hb of 10.4, coags wnl, Cr of 1.5 and albumin of 3.2. IR was consulted for possible embolization/angio- recommended medical management unless endoscopic tx is unsuccessful or contraindicated.    Hospital Course:  During Ms. Zuniga's hospitalization, she received 2 total units of pRBC for active LGIB bleeding. Plavix was held in setting of the bleed. She was taken to the endoscopy suite for a colonoscopy on 12/3/24. Colonoscopy did not identify any active bleeds however diverticulosis was seen in the entire examined colon. This is likely the source of bleeding. Some non-bleeding internal hemorrhoids were also seen. No endoscopic intervention taken. High fiber diet recommended. Plavix will be resumed at the time of discharge. Instructed patient to discuss risks and benefits regarding continuing Plavix with her PCP and neurologist.       Important Medication Changes and Reason:    Active or Pending Issues Requiring Follow-up:  1. Follow-up with PCP within 1-2 weeks of discharge to discuss risks vs. benefits of Plavix     Advanced Directives:   [x] Full code  [ ] DNR  [ ] Hospice    Discharge Diagnoses: diverticular bleed          HPI:  89F w/ PMH of x TIAs on Plavix, T2DM, GERD, hypothyroidism and HTN presented w/ 2 days of BRBPR. She said that she woke up the day after thanksgiving and used the restroom to urinate and saw a lot of blood in the toilet bowl. She has had more than 4 episodes since including one as the patient was being interviewed. She did not endorse any pain with the bowel movement or bleeding. Denies any current chest pain, SOB, headaches, dizziness, palpitations, lightheadedness, visual disturbances, n/v, diarrhea, constipation, abdominal pain, melena, dysuria. She initially presented w/ these symptoms to [facility] where she was found to have active extravasation at the proximal sigmoid colon on CTA abdomen/pelvis. She was given 1u pRBC and transferred to [facility] for IR/GI evaluation. She stated that her last colonoscopy was more than 10 years ago- shared decision making with her doctors to not continue with them going forward due to her age. She endorses that her brother passed away from colorectal cancer, father passed away from stomach cancer and sister passed away from pancreatic cancer. She was placed on Plavix after having a TIA 6 years ago iso b/l carotid artery stenosis. She also mentioned that she was previously hospitalized a long time ago w/ kidney failure however she does not remember what caused the failure. Labs in the [facility] ED remarkable for Hb of 10.4, coags wnl, Cr of 1.5 and albumin of 3.2. IR was consulted for possible embolization/angio- recommended medical management unless endoscopic tx is unsuccessful or contraindicated.    Hospital Course:  During Ms. Zuniga's hospitalization, she received 2 total units of pRBC for active LGIB bleeding. Plavix was held in setting of the bleed. She was taken to the endoscopy suite for a colonoscopy on 12/3/24. Colonoscopy did not identify any active bleeds however diverticulosis was seen in the entire examined colon. This is likely the source of bleeding. Some non-bleeding internal hemorrhoids were also seen. No endoscopic intervention taken. High fiber diet recommended. Plavix will be resumed at the time of discharge. Instructed patient to discuss risks and benefits regarding continuing Plavix with her PCP and neurologist.       Important Medication Changes and Reason: Plavis was held during this hospitalization, restart on 12/10/24    Active or Pending Issues Requiring Follow-up:  1. Follow-up with PCP within 1-2 weeks of discharge to discuss risks vs. benefits of Plavix     Advanced Directives:   [x] Full code  [ ] DNR  [ ] Hospice    Discharge Diagnoses: diverticular bleed          HPI:  89F w/ PMH of x TIAs on Plavix, T2DM, GERD, hypothyroidism and HTN presented w/ 2 days of BRBPR. She said that she woke up the day after thanksgiving and used the restroom to urinate and saw a lot of blood in the toilet bowl. She has had more than 4 episodes since including one as the patient was being interviewed. She did not endorse any pain with the bowel movement or bleeding. Denies any current chest pain, SOB, headaches, dizziness, palpitations, lightheadedness, visual disturbances, n/v, diarrhea, constipation, abdominal pain, melena, dysuria. She initially presented w/ these symptoms to [facility] where she was found to have active extravasation at the proximal sigmoid colon on CTA abdomen/pelvis. She was given 1u pRBC and transferred to [facility] for IR/GI evaluation. She stated that her last colonoscopy was more than 10 years ago- shared decision making with her doctors to not continue with them going forward due to her age. She endorses that her brother passed away from colorectal cancer, father passed away from stomach cancer and sister passed away from pancreatic cancer. She was placed on Plavix after having a TIA 6 years ago iso b/l carotid artery stenosis. She also mentioned that she was previously hospitalized a long time ago w/ kidney failure however she does not remember what caused the failure. Labs in the [facility] ED remarkable for Hb of 10.4, coags wnl, Cr of 1.5 and albumin of 3.2. IR was consulted for possible embolization/angio- recommended medical management unless endoscopic tx is unsuccessful or contraindicated.    Hospital Course:  During Ms. Zuniga's hospitalization, she received 2 total units of pRBC for active LGIB bleeding. Plavix was held in setting of the bleed. She was taken to the endoscopy suite for a colonoscopy on 12/3/24. Colonoscopy did not identify any active bleeds however diverticulosis was seen in the entire examined colon. This is likely the source of bleeding. Some non-bleeding internal hemorrhoids were also seen. No endoscopic intervention taken. High fiber diet recommended. Plavix to be resumed on 12/10/24. Instructed patient to discuss risks and benefits regarding continuing Plavix with her PCP and neurologist.       Important Medication Changes and Reason: Plavis was held during this hospitalization, restart on 12/10/24    Active or Pending Issues Requiring Follow-up:  1. Follow-up with PCP within 1-2 weeks of discharge to discuss risks vs. benefits of Plavix     Advanced Directives:   [x] Full code  [ ] DNR  [ ] Hospice    Discharge Diagnoses: diverticular bleed          HPI:  The patient is a 89F w/ PMH of x TIAs on Plavix, T2DM, GERD, hypothyroidism and HTN presented w/ 2 days of BRBPR. She said that she woke up the day after thanksgiving and used the restroom to urinate and saw a lot of blood in the toilet bowl. She has had more than 4 episodes since including one as the patient was being interviewed. She did not endorse any pain with the bowel movement or bleeding. Denies any current chest pain, SOB, headaches, dizziness, palpitations, lightheadedness, visual disturbances, n/v, diarrhea, constipation, abdominal pain, melena, dysuria. She initially presented w/ these symptoms to [facility] where she was found to have active extravasation at the proximal sigmoid colon on CTA abdomen/pelvis. She was given 1u pRBC and transferred to [facility] for IR/GI evaluation. She stated that her last colonoscopy was more than 10 years ago- shared decision making with her doctors to not continue with them going forward due to her age. She endorses that her brother passed away from colorectal cancer, father passed away from stomach cancer and sister passed away from pancreatic cancer. She was placed on Plavix after having a TIA 6 years ago iso b/l carotid artery stenosis. She also mentioned that she was previously hospitalized a long time ago w/ kidney failure however she does not remember what caused the failure. Labs in the [facility] ED remarkable for Hb of 10.4, coags wnl, Cr of 1.5 and albumin of 3.2. IR was consulted for possible embolization/angio- recommended medical management unless endoscopic tx is unsuccessful or contraindicated.    Hospital Course:  During Ms. Zuniga's hospitalization, she received 2 total units of pRBC for active LGIB bleeding. Plavix was held in setting of the bleed. She was taken to the endoscopy suite for a colonoscopy on 12/3/24. Colonoscopy did not identify any active bleeds however diverticulosis was seen in the entire examined colon. This is likely the source of bleeding. Some non-bleeding internal hemorrhoids were also seen. No endoscopic intervention taken. High fiber diet recommended. Plavix to be resumed on 12/10/24. Instructed patient to discuss risks and benefits regarding continuing Plavix with her PCP and neurologist.       Important Medication Changes and Reason: Plavis was held during this hospitalization, restart on 12/10/24    Active or Pending Issues Requiring Follow-up:  1. Follow-up with PCP within 1-2 weeks of discharge to discuss risks vs. benefits of Plavix     Advanced Directives:   [x] Full code  [ ] DNR  [ ] Hospice    Discharge Diagnoses: diverticular bleed

## 2024-12-04 NOTE — DISCHARGE NOTE NURSING/CASE MANAGEMENT/SOCIAL WORK - PATIENT PORTAL LINK FT
You can access the FollowMyHealth Patient Portal offered by [facility] by registering at the following website: [email or web address]. By joining [employer]’s FollowMyHealth portal, you will also be able to view your health information using other applications (apps) compatible with our system.

## 2024-12-04 NOTE — DISCHARGE NOTE PROVIDER - NSDCCPCAREPLAN_GEN_ALL_CORE_FT
PRINCIPAL DISCHARGE DIAGNOSIS  Diagnosis: Lower GI bleed  Assessment and Plan of Treatment: You were seen in the hospital for a lower GI bleed. Specifically, a diverticular bleed. Your hemoglobin levels were low due to the active bleeding so we gave you two units of blood. You went for a colonoscopy on 12/3 which showed numerous diverticula throughout the entire colon. This is the likely source of your bleed. Because the GI doctors did not see any active bleeding during the colonoscopy they did not have to do any interventions. During the time of your discharge, your bleeding has resolved and hemoglobin levels are stable. If you experience unstoppable bleeding again or experience shortness of breath, chest pain, nausea, vomiting, dizziness, or any other concerning symptoms, please call your doctor or go to the nearest emergency room.     PRINCIPAL DISCHARGE DIAGNOSIS  Diagnosis: Lower GI bleed  Assessment and Plan of Treatment: You were seen in the hospital for a lower GI bleed. Specifically, a diverticular bleed. Your hemoglobin levels were low due to the active bleeding so we gave you two units of blood. You went for a colonoscopy on 12/3 which showed numerous diverticula throughout the entire colon. This is the likely source of your bleed. Because the GI doctors did not see any active bleeding during the colonoscopy they did not have to do any interventions. During the time of your discharge, your bleeding has resolved and hemoglobin levels are stable. If you experience unstoppable bleeding again or experience shortness of breath, chest pain, nausea, vomiting, dizziness, or any other concerning symptoms, please call your doctor or go to the nearest emergency room.  Of note: we held your home Plavix due to the bleeding. Please restart it on 12/10/24 and talk to your PCP or neurologists regarding long-term use going forward.

## 2024-12-04 NOTE — PROGRESS NOTE ADULT - PROBLEM/PLAN-10
DISPLAY PLAN FREE TEXT
Record(s) were uploaded into Patient's Chart   
DISPLAY PLAN FREE TEXT

## 2024-12-04 NOTE — PROGRESS NOTE ADULT - PROBLEM SELECTOR PLAN 10
- C/w home allopurinol 300mg every other day

## 2024-12-04 NOTE — PROGRESS NOTE ADULT - PROBLEM SELECTOR PLAN 8
On home labetalol 200mg qd and amlodipine 10mg qd- both held iso active bleeding

## 2024-12-04 NOTE — DISCHARGE NOTE PROVIDER - NSDCCPTREATMENT_GEN_ALL_CORE_FT
PRINCIPAL PROCEDURE  Procedure: Colonoscopy  Findings and Treatment: Impression:            Preparation of the colon was poor with stool throughout the colon.   - Diverticulosis in the entire examined colon. This is likely the source of bleeding.  - Non-bleeding internal hemorrhoids.  - No blood or bleeding seen.   - No specimens collected.  Recommendation:      - Return patient to hospital [facility] for ongoing care.                       - High fibre diet                       - Miralax prn for constipation

## 2024-12-04 NOTE — DISCHARGE NOTE PROVIDER - NSDCMRMEDTOKEN_GEN_ALL_CORE_FT
allopurinol 300 mg oral tablet: 1 tab(s) orally every other day  amLODIPine 5 mg oral tablet: 2 tab(s) orally once a day  atorvastatin 80 mg oral tablet: 1 tab(s) orally once a day  furosemide 40 mg oral tablet: 1 tab(s) orally once a day  labetalol 200 mg oral tablet: 1 tab(s) orally once a day  Lanlizette Sollaurelar Pen 100 units/mL subcutaneous solution: 10 unit(s) in am  levothyroxine 75 mcg (0.075 mg) oral tablet: 1 tab(s) orally once a day  Plavix 75 mg oral tablet: 1 tab(s) orally once a day

## 2024-12-04 NOTE — PROGRESS NOTE ADULT - PROBLEM SELECTOR PLAN 1
CTA Abdomen/Pelvis w/ IV contrast showing area of active extravasation within the proximal sigmoid colon.  Hb on arrival to [facility] 10.4   Last colon >10 years ago  Now resolved, likely diverticular bleed     - GI consulted- colonoscopy on Tuesday 12/3 as per patient's request   - IR consulted- medical management for now   - NPO, resume diet after   - CBC q12h, Transfuse for Hb <8 or if patient is actively bleeding and symptomatic

## 2024-12-04 NOTE — PROGRESS NOTE ADULT - PROBLEM SELECTOR PLAN 6
Baseline Cr 1.5-1.6   Hold home Lasix 40mg qd  Cr back to baseline

## 2024-12-04 NOTE — PROGRESS NOTE ADULT - SUBJECTIVE AND OBJECTIVE BOX
Patient is a 89y old  Female who presents with a chief complaint of BRBPR (03 Dec 2024 07:44)      ======Overnight/Subjective======  Overnight    Subjective    Brief daily plan    ======Medications======  MEDICATIONS  (STANDING):  allopurinol 300 milliGRAM(s) Oral <User Schedule>  atorvastatin 80 milliGRAM(s) Oral at bedtime  dextrose 5%. 1000 milliLiter(s) (100 mL/Hr) IV Continuous <Continuous>  dextrose 5%. 1000 milliLiter(s) (50 mL/Hr) IV Continuous <Continuous>  dextrose 50% Injectable 25 Gram(s) IV Push once  dextrose 50% Injectable 12.5 Gram(s) IV Push once  dextrose 50% Injectable 25 Gram(s) IV Push once  dextrose Oral Gel 15 Gram(s) Oral once  glucagon  Injectable 1 milliGRAM(s) IntraMuscular once  insulin lispro (ADMELOG) corrective regimen sliding scale   SubCutaneous three times a day before meals  insulin lispro (ADMELOG) corrective regimen sliding scale   SubCutaneous at bedtime  levothyroxine 75 MICROGram(s) Oral daily    MEDICATIONS  (PRN):  acetaminophen     Tablet .. 650 milliGRAM(s) Oral every 6 hours PRN Temp greater or equal to 38C (100.4F), Mild Pain (1 - 3)      ======Vital Signs======  T(C): 36.5 (24 @ 04:20), Max: 36.8 (24 @ 15:54)  T(F): 97.7 (24 @ 04:20), Max: 98.3 (24 @ 15:54)  HR: 65 (24 @ 04:20) (65 - 91)  BP: 146/66 (24 @ 04:20) (132/60 - 185/71)  BP(mean): 87 (24 @ 09:15) (87 - 87)  RR: 19 (24 @ 04:20) (16 - 24)  SpO2: 94% (24 @ 04:20) (94% - 99%)    ======Physical exams======  GENERAL: NAD  Cardiovascular: RRR, S1 S2, no m/r/g, no JVD  LUNGS: Unlabored respiration, CTABL  ABDOMEN: Soft, NTND  EXTREMITIES: Warm extremities, no edema, peripheral pulses 2+ bilaterally  NEURO: AAOx3, PERRLA    ======Labs======                9.9[L]  7.03 >----------< 209  (MCV: 92.5)                30.7[L]   144 | 109[H] | 19  -----------------------< 121[H]  4.2 | 23 | 1.33[H]    TPro: 4.7[L] / Alb: 2.9[L] / TBili: 0.9 / DBili: -- / AlkPhos: 54 / ALT: 14 / AST: 17 (24 @ 05:01)  Ca: 9.0 / Phos: 3.1 / M.0 (24 @ 07:13)    Gas: 7.38 / 39 / 48[H] / 23 / 81.3% / -1.8 (24 @ 03:50)      ======Microbiology======  Urinalysis Basic - ( 03 Dec 2024 07:13 )    Color: x / Appearance: x / SG: x / pH: x  Gluc: 121 mg/dL / Ketone: x  / Bili: x / Urobili: x   Blood: x / Protein: x / Nitrite: x   Leuk Esterase: x / RBC: x / WBC x   Sq Epi: x / Non Sq Epi: x / Bacteria: x          ======I&O's======  I&O's Summary    03 Dec 2024 07:01  -  04 Dec 2024 07:00  --------------------------------------------------------  IN: 740 mL / OUT: 2150 mL / NET: -1410 mL

## 2024-12-04 NOTE — DISCHARGE NOTE NURSING/CASE MANAGEMENT/SOCIAL WORK - FINANCIAL ASSISTANCE
[facility] provides services at a reduced cost to those who are determined to be eligible through [facility]’s financial assistance program. Information regarding [facility]’s financial assistance program can be found by going to [email or web address] or by calling [phone].

## 2024-12-04 NOTE — PROGRESS NOTE ADULT - PROBLEM SELECTOR PLAN 2
Iso of LGIB, Hb on arrival to [facility] 10.4   Continues to have active hematochezia   s/p 1u pRBC at OSH; s/p 1u pRBC overnight 12/1   LGIB now resolved    - Trend and monitor CBC q12h   - maintain active type & screen  - Transfuse for Hb <8 or if patient is actively bleeding and symptomatic  - Colonoscopy w/ GI 12/3

## 2024-12-05 ENCOUNTER — RX RENEWAL (OUTPATIENT)
Age: 88
End: 2024-12-05

## 2024-12-06 PROBLEM — I65.29 OCCLUSION AND STENOSIS OF UNSPECIFIED CAROTID ARTERY: Chronic | Status: ACTIVE | Noted: 2024-11-30

## 2024-12-11 ENCOUNTER — EMERGENCY (EMERGENCY)
Facility: HOSPITAL | Age: 88
LOS: 0 days | Discharge: HOME | End: 2024-12-11
Attending: EMERGENCY MEDICINE
Payer: MEDICARE

## 2024-12-11 VITALS
RESPIRATION RATE: 19 BRPM | SYSTOLIC BLOOD PRESSURE: 146 MMHG | TEMPERATURE: 98 F | HEART RATE: 82 BPM | DIASTOLIC BLOOD PRESSURE: 75 MMHG | OXYGEN SATURATION: 97 %

## 2024-12-11 VITALS
SYSTOLIC BLOOD PRESSURE: 132 MMHG | TEMPERATURE: 97 F | HEIGHT: 63 IN | HEART RATE: 77 BPM | WEIGHT: 145.06 LBS | RESPIRATION RATE: 18 BRPM | OXYGEN SATURATION: 94 % | DIASTOLIC BLOOD PRESSURE: 76 MMHG

## 2024-12-11 DIAGNOSIS — K42.9 UMBILICAL HERNIA WITHOUT OBSTRUCTION OR GANGRENE: Chronic | ICD-10-CM

## 2024-12-11 DIAGNOSIS — M16.11 UNILATERAL PRIMARY OSTEOARTHRITIS, RIGHT HIP: Chronic | ICD-10-CM

## 2024-12-11 LAB
ALBUMIN SERPL ELPH-MCNC: 2.6 G/DL — LOW (ref 3.3–5)
ALP SERPL-CCNC: 213 U/L — HIGH (ref 40–120)
ALT FLD-CCNC: 200 U/L — HIGH (ref 12–78)
ANION GAP SERPL CALC-SCNC: 10 MMOL/L — SIGNIFICANT CHANGE UP (ref 5–17)
AST SERPL-CCNC: 81 U/L — HIGH (ref 15–37)
BASOPHILS # BLD AUTO: 0.04 K/UL — SIGNIFICANT CHANGE UP (ref 0–0.2)
BASOPHILS NFR BLD AUTO: 0.4 % — SIGNIFICANT CHANGE UP (ref 0–2)
BILIRUB SERPL-MCNC: 0.7 MG/DL — SIGNIFICANT CHANGE UP (ref 0.2–1.2)
BUN SERPL-MCNC: 26 MG/DL — HIGH (ref 7–23)
CALCIUM SERPL-MCNC: 8.6 MG/DL — SIGNIFICANT CHANGE UP (ref 8.5–10.1)
CHLORIDE SERPL-SCNC: 109 MMOL/L — HIGH (ref 96–108)
CK SERPL-CCNC: 212 U/L — HIGH (ref 26–192)
CO2 SERPL-SCNC: 21 MMOL/L — LOW (ref 22–31)
CREAT SERPL-MCNC: 1.55 MG/DL — HIGH (ref 0.5–1.3)
EGFR: 30 ML/MIN/1.73M2 — LOW
EOSINOPHIL # BLD AUTO: 0.09 K/UL — SIGNIFICANT CHANGE UP (ref 0–0.5)
EOSINOPHIL NFR BLD AUTO: 0.9 % — SIGNIFICANT CHANGE UP (ref 0–6)
GLUCOSE SERPL-MCNC: 186 MG/DL — HIGH (ref 70–99)
HCT VFR BLD CALC: 28.5 % — LOW (ref 34.5–45)
HGB BLD-MCNC: 9.6 G/DL — LOW (ref 11.5–15.5)
IMM GRANULOCYTES NFR BLD AUTO: 0.5 % — SIGNIFICANT CHANGE UP (ref 0–0.9)
LYMPHOCYTES # BLD AUTO: 0.68 K/UL — LOW (ref 1–3.3)
LYMPHOCYTES # BLD AUTO: 6.5 % — LOW (ref 13–44)
MCHC RBC-ENTMCNC: 29.5 PG — SIGNIFICANT CHANGE UP (ref 27–34)
MCHC RBC-ENTMCNC: 33.7 G/DL — SIGNIFICANT CHANGE UP (ref 32–36)
MCV RBC AUTO: 87.7 FL — SIGNIFICANT CHANGE UP (ref 80–100)
MONOCYTES # BLD AUTO: 1.06 K/UL — HIGH (ref 0–0.9)
MONOCYTES NFR BLD AUTO: 10.2 % — SIGNIFICANT CHANGE UP (ref 2–14)
NEUTROPHILS # BLD AUTO: 8.51 K/UL — HIGH (ref 1.8–7.4)
NEUTROPHILS NFR BLD AUTO: 81.5 % — HIGH (ref 43–77)
NRBC # BLD: 0 /100 WBCS — SIGNIFICANT CHANGE UP (ref 0–0)
PLATELET # BLD AUTO: 279 K/UL — SIGNIFICANT CHANGE UP (ref 150–400)
POTASSIUM SERPL-MCNC: 3.7 MMOL/L — SIGNIFICANT CHANGE UP (ref 3.5–5.3)
POTASSIUM SERPL-SCNC: 3.7 MMOL/L — SIGNIFICANT CHANGE UP (ref 3.5–5.3)
PROT SERPL-MCNC: 6.5 GM/DL — SIGNIFICANT CHANGE UP (ref 6–8.3)
RBC # BLD: 3.25 M/UL — LOW (ref 3.8–5.2)
RBC # FLD: 15.6 % — HIGH (ref 10.3–14.5)
SODIUM SERPL-SCNC: 140 MMOL/L — SIGNIFICANT CHANGE UP (ref 135–145)
TROPONIN I, HIGH SENSITIVITY RESULT: 15.7 NG/L — SIGNIFICANT CHANGE UP
WBC # BLD: 10.43 K/UL — SIGNIFICANT CHANGE UP (ref 3.8–10.5)
WBC # FLD AUTO: 10.43 K/UL — SIGNIFICANT CHANGE UP (ref 3.8–10.5)

## 2024-12-11 PROCEDURE — 70450 CT HEAD/BRAIN W/O DYE: CPT | Mod: 26,MC

## 2024-12-11 PROCEDURE — 73590 X-RAY EXAM OF LOWER LEG: CPT | Mod: 26,50

## 2024-12-11 PROCEDURE — 99285 EMERGENCY DEPT VISIT HI MDM: CPT

## 2024-12-11 PROCEDURE — 93010 ELECTROCARDIOGRAM REPORT: CPT

## 2024-12-11 RX ORDER — SODIUM CHLORIDE 9 MG/ML
1000 INJECTION, SOLUTION INTRAMUSCULAR; INTRAVENOUS; SUBCUTANEOUS ONCE
Refills: 0 | Status: COMPLETED | OUTPATIENT
Start: 2024-12-11 | End: 2024-12-11

## 2024-12-11 RX ADMIN — SODIUM CHLORIDE 1000 MILLILITER(S): 9 INJECTION, SOLUTION INTRAMUSCULAR; INTRAVENOUS; SUBCUTANEOUS at 07:28

## 2024-12-11 RX ADMIN — SODIUM CHLORIDE 1000 MILLILITER(S): 9 INJECTION, SOLUTION INTRAMUSCULAR; INTRAVENOUS; SUBCUTANEOUS at 06:28

## 2024-12-11 NOTE — ED ADULT NURSE NOTE - CHPI ED NUR SYMPTOMS POS
[FreeTextEntry3] : I, Veronique Jean Baptiste, personally transcribed these services in the presence of Dr. Monse Cardozo MD. I, Dr. Monse Cardozo MD, personally performed the services described in this documentation as scribed by Veronique Jean Baptiste in my presence and it is both accurate and complete. 
ABRASION/PAIN

## 2024-12-11 NOTE — ED ADULT TRIAGE NOTE - CHIEF COMPLAINT QUOTE
Patient BIBA: patient reports "I got dizzy and couldn't get out of bed. I got on floor next to bed. and couldn't get up." Patient reports 6/10 pain to lower bad. Patient reports Generalized weakness. . No droops, No drift speech clear and appropriate. HERNANDEZ x4 without deficits or difficulty. Recent hospitalization for Diverticulitis with bleeding, received transfusion.  PMH: Anemia, Diverticulitis, DM, HTN, CKD, TIA Carotid stenosis, GERD, Hypothyroid, Gout, HLD, Partial Right hip replacement, Hernia repair, Tonsillectomy

## 2024-12-11 NOTE — ED ADULT NURSE NOTE - OBJECTIVE STATEMENT
89 yo F BIBEMS from home c/o generalized weakness, dizziness, lower back pain, and was found on floor. Pt remembers waking up from floor, unsure if syncopized, noted to have lac on R forearm. Aox3, on arrival, difficulty with ambulation, amb w walker at baseline.  PMH: Anemia, Diverticulitis, DM, HTN, CKD, TIA Carotid stenosis, GERD, Hypothyroid, Gout, HLD, Partial Right hip replacement, Hernia repair, Tonsillectomy

## 2024-12-11 NOTE — ED PROVIDER NOTE - NSFOLLOWUPINSTRUCTIONS_ED_ALL_ED_FT
Please follow up with your primary care physician within the next 48 hours for repeat blood work.    Please continue taking your medications as prescribed by your doctors.     Please return to the emergency department if you experience any of the following symptoms:    Fever  Chest pain  Difficulty breathing  Abdominal pain  Nausea  Vomiting   Unable to walk  Falls  Weakness or numbness in arms or legs

## 2024-12-11 NOTE — ED PROVIDER NOTE - PATIENT PORTAL LINK FT
You can access the FollowMyHealth Patient Portal offered by [facility] by registering at the following website: [email or web address]. By joining [employer]’s FollowMyHealth portal, you will also be able to view your health information using other applications (apps) compatible with our system.

## 2024-12-11 NOTE — ED PROVIDER NOTE - PROGRESS NOTE DETAILS
Alyssa DO: Received sign out from Dr. Martin. Patient had fall in middle of night with difficulty getting out of bed. CT negative and Xrays negative. Labs showing transaminitis and alk phos elevation. Patient has no abdominal tenderness at this time. Patient instructed to follow up with pcp for repeat lab work. Patient concerned about her difficulty moving in the middle of the night. Will trial of ambulation. Patient uses walker at baseline after hip arthroplasty 8 years ago. Shah, DO: Patient ambulating with walker at baseline and feels well. Patient instructed to follow up with pcp for repeat labs within 48 hours.

## 2024-12-11 NOTE — ED ADULT NURSE NOTE - NSFALLHARMRISKINTERV_ED_ALL_ED
Assistance OOB with selected safe patient handling equipment if applicable/Communicate risk of Fall with Harm to all staff, patient, and family/Provide visual cue: red socks, yellow wristband, yellow gown, etc/Reinforce activity limits and safety measures with patient and family/Bed in lowest position, wheels locked, appropriate side rails in place/Call bell, personal items and telephone in reach/Instruct patient to call for assistance before getting out of bed/chair/stretcher/Non-slip footwear applied when patient is off stretcher/[place]arts to call system/Physically safe environment - no spills, clutter or unnecessary equipment/Purposeful Proactive Rounding/Room/bathroom lighting operational, light cord in reach

## 2024-12-11 NOTE — ED ADULT NURSE NOTE - NSICDXPASTSURGICALHX_GEN_ALL_CORE_FT
Iron rx sent.   PAST SURGICAL HISTORY:  Osteoarthritis of right hip, unspecified osteoarthritis type partial Right Hip Replacement 2017    Paraumbilical hernia     S/P Tonsillectomy and Adenoidectomy     S/P Tonsillectomy and Adenoidectomy

## 2024-12-13 ENCOUNTER — APPOINTMENT (OUTPATIENT)
Dept: FAMILY MEDICINE | Facility: CLINIC | Age: 88
End: 2024-12-13
Payer: MEDICARE

## 2024-12-13 ENCOUNTER — LABORATORY RESULT (OUTPATIENT)
Age: 88
End: 2024-12-13

## 2024-12-13 VITALS
HEART RATE: 83 BPM | HEIGHT: 63 IN | TEMPERATURE: 97.6 F | OXYGEN SATURATION: 97 % | DIASTOLIC BLOOD PRESSURE: 70 MMHG | SYSTOLIC BLOOD PRESSURE: 145 MMHG | RESPIRATION RATE: 16 BRPM
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
## 2024-12-13 DIAGNOSIS — L89.90 PRESSURE ULCER OF UNSPECIFIED SITE, UNSPECIFIED STAGE: ICD-10-CM

## 2024-12-13 PROCEDURE — 36415 COLL VENOUS BLD VENIPUNCTURE: CPT

## 2024-12-13 PROCEDURE — 99495 TRANSJ CARE MGMT MOD F2F 14D: CPT

## 2024-12-13 RX ORDER — LIDOCAINE 50 MG/G
5 CREAM TOPICAL
Qty: 1 | Refills: 0 | Status: ACTIVE | COMMUNITY
Start: 2024-12-13 | End: 1900-01-01

## 2024-12-13 RX ORDER — BENZOCAINE 20 %
20 OINTMENT (GRAM) TOPICAL
Qty: 1 | Refills: 0 | Status: ACTIVE | COMMUNITY
Start: 2024-12-13 | End: 1900-01-01

## 2024-12-18 LAB
ALBUMIN SERPL ELPH-MCNC: 3.5 G/DL
ALP BLD-CCNC: 192 U/L
ALT SERPL-CCNC: 107 U/L
ANA PAT FLD IF-IMP: ABNORMAL
ANACR T: ABNORMAL
AST SERPL-CCNC: 45 U/L
BASOPHILS # BLD AUTO: 0.06 K/UL
BASOPHILS NFR BLD AUTO: 0.6 %
BILIRUB DIRECT SERPL-MCNC: 0.4 MG/DL
BILIRUB INDIRECT SERPL-MCNC: 0.5 MG/DL
BILIRUB SERPL-MCNC: 0.8 MG/DL
CHOLEST SERPL-MCNC: 116 MG/DL
CRP SERPL-MCNC: 37 MG/L
EOSINOPHIL # BLD AUTO: 0.37 K/UL
EOSINOPHIL NFR BLD AUTO: 3.7 %
ESTIMATED AVERAGE GLUCOSE: 137 MG/DL
FERRITIN SERPL-MCNC: 191 NG/ML
FOLATE SERPL-MCNC: >20 NG/ML
HBA1C MFR BLD HPLC: 6.4 %
HCT VFR BLD CALC: 28.5 %
HDLC SERPL-MCNC: 32 MG/DL
HGB BLD-MCNC: 9.2 G/DL
IMM GRANULOCYTES NFR BLD AUTO: 0.5 %
IRON SATN MFR SERPL: 13 %
IRON SERPL-MCNC: 30 UG/DL
LDLC SERPL CALC-MCNC: 63 MG/DL
LYMPHOCYTES # BLD AUTO: 0.72 K/UL
LYMPHOCYTES NFR BLD AUTO: 7.2 %
MAN DIFF?: NORMAL
MCHC RBC-ENTMCNC: 28.6 PG
MCHC RBC-ENTMCNC: 32.3 G/DL
MCV RBC AUTO: 88.5 FL
METHYLMALONATE SERPL-SCNC: 274 NMOL/L
MONOCYTES # BLD AUTO: 0.69 K/UL
MONOCYTES NFR BLD AUTO: 6.9 %
NEUTROPHILS # BLD AUTO: 8.16 K/UL
NEUTROPHILS NFR BLD AUTO: 81.1 %
NONHDLC SERPL-MCNC: 84 MG/DL
PLATELET # BLD AUTO: 383 K/UL
PROT SERPL-MCNC: 6.1 G/DL
RBC # BLD: 3.22 M/UL
RBC # FLD: 15.6 %
SMOOTH MUSCLE AB SER QL IF: NORMAL
T PALLIDUM AB SER QL IA: NEGATIVE
TIBC SERPL-MCNC: 228 UG/DL
TRIGL SERPL-MCNC: 115 MG/DL
TSH SERPL-ACNC: 4.96 UIU/ML
UIBC SERPL-MCNC: 198 UG/DL
VIT B12 SERPL-MCNC: >2000 PG/ML
WBC # FLD AUTO: 10.05 K/UL

## 2024-12-20 ENCOUNTER — APPOINTMENT (OUTPATIENT)
Dept: ORTHOPEDIC SURGERY | Facility: CLINIC | Age: 88
End: 2024-12-20

## 2024-12-23 ENCOUNTER — RX RENEWAL (OUTPATIENT)
Age: 88
End: 2024-12-23

## 2025-01-29 ENCOUNTER — APPOINTMENT (OUTPATIENT)
Dept: FAMILY MEDICINE | Facility: CLINIC | Age: 89
End: 2025-01-29
Payer: MEDICARE

## 2025-01-29 VITALS
SYSTOLIC BLOOD PRESSURE: 148 MMHG | RESPIRATION RATE: 16 BRPM | HEART RATE: 69 BPM | WEIGHT: 154 LBS | BODY MASS INDEX: 26.29 KG/M2 | TEMPERATURE: 98.4 F | HEIGHT: 64 IN | DIASTOLIC BLOOD PRESSURE: 68 MMHG | OXYGEN SATURATION: 95 %

## 2025-01-29 DIAGNOSIS — R79.89 OTHER SPECIFIED ABNORMAL FINDINGS OF BLOOD CHEMISTRY: ICD-10-CM

## 2025-01-29 DIAGNOSIS — D64.9 ANEMIA, UNSPECIFIED: ICD-10-CM

## 2025-01-29 PROCEDURE — 36415 COLL VENOUS BLD VENIPUNCTURE: CPT

## 2025-01-29 PROCEDURE — 99214 OFFICE O/P EST MOD 30 MIN: CPT

## 2025-01-30 LAB
ALBUMIN SERPL ELPH-MCNC: 4 G/DL
ALP BLD-CCNC: 94 U/L
ALT SERPL-CCNC: 12 U/L
AST SERPL-CCNC: 19 U/L
BASOPHILS # BLD AUTO: 0.05 K/UL
BASOPHILS NFR BLD AUTO: 0.7 %
BILIRUB DIRECT SERPL-MCNC: 0.2 MG/DL
BILIRUB INDIRECT SERPL-MCNC: 0.4 MG/DL
BILIRUB SERPL-MCNC: 0.6 MG/DL
EOSINOPHIL # BLD AUTO: 0.46 K/UL
EOSINOPHIL NFR BLD AUTO: 6.5 %
FERRITIN SERPL-MCNC: 35 NG/ML
FOLATE SERPL-MCNC: >20 NG/ML
HCT VFR BLD CALC: 36.6 %
HGB BLD-MCNC: 11.4 G/DL
IMM GRANULOCYTES NFR BLD AUTO: 0.3 %
IRON SATN MFR SERPL: 18 %
IRON SERPL-MCNC: 49 UG/DL
LYMPHOCYTES # BLD AUTO: 0.93 K/UL
LYMPHOCYTES NFR BLD AUTO: 13.1 %
MAN DIFF?: NORMAL
MCHC RBC-ENTMCNC: 27.8 PG
MCHC RBC-ENTMCNC: 31.1 G/DL
MCV RBC AUTO: 89.3 FL
MONOCYTES # BLD AUTO: 0.48 K/UL
MONOCYTES NFR BLD AUTO: 6.8 %
NEUTROPHILS # BLD AUTO: 5.17 K/UL
NEUTROPHILS NFR BLD AUTO: 72.6 %
PLATELET # BLD AUTO: 248 K/UL
PROT SERPL-MCNC: 6.5 G/DL
RBC # BLD: 4.1 M/UL
RBC # FLD: 15.6 %
TIBC SERPL-MCNC: 272 UG/DL
UIBC SERPL-MCNC: 223 UG/DL
VIT B12 SERPL-MCNC: 915 PG/ML
WBC # FLD AUTO: 7.11 K/UL

## 2025-02-14 ENCOUNTER — RX RENEWAL (OUTPATIENT)
Age: 89
End: 2025-02-14

## 2025-02-15 ENCOUNTER — RX RENEWAL (OUTPATIENT)
Age: 89
End: 2025-02-15

## 2025-02-19 ENCOUNTER — RX RENEWAL (OUTPATIENT)
Age: 89
End: 2025-02-19

## 2025-02-19 RX ORDER — ALLOPURINOL 300 MG/1
300 TABLET ORAL
Qty: 90 | Refills: 1 | Status: ACTIVE | COMMUNITY
Start: 2025-02-19 | End: 1900-01-01

## 2025-03-07 NOTE — DIETITIAN INITIAL EVALUATION ADULT. - PROBLEM SELECTOR PLAN 4
Continue patient on Lipitor as per her outpatient dose.  Check lipid panel in a.m. (M6) obeys commands

## 2025-03-15 ENCOUNTER — RX RENEWAL (OUTPATIENT)
Age: 89
End: 2025-03-15

## 2025-03-24 ENCOUNTER — RX RENEWAL (OUTPATIENT)
Age: 89
End: 2025-03-24

## 2025-04-03 ENCOUNTER — APPOINTMENT (OUTPATIENT)
Dept: FAMILY MEDICINE | Facility: CLINIC | Age: 89
End: 2025-04-03

## 2025-04-08 ENCOUNTER — APPOINTMENT (OUTPATIENT)
Dept: FAMILY MEDICINE | Facility: CLINIC | Age: 89
End: 2025-04-08
Payer: MEDICARE

## 2025-04-08 ENCOUNTER — NON-APPOINTMENT (OUTPATIENT)
Age: 89
End: 2025-04-08

## 2025-04-08 VITALS
BODY MASS INDEX: 25.95 KG/M2 | HEART RATE: 62 BPM | HEIGHT: 64 IN | WEIGHT: 152 LBS | RESPIRATION RATE: 16 BRPM | SYSTOLIC BLOOD PRESSURE: 130 MMHG | OXYGEN SATURATION: 95 % | TEMPERATURE: 97.5 F | DIASTOLIC BLOOD PRESSURE: 52 MMHG

## 2025-04-08 DIAGNOSIS — E11.9 TYPE 2 DIABETES MELLITUS W/OUT COMPLICATIONS: ICD-10-CM

## 2025-04-08 DIAGNOSIS — D64.9 ANEMIA, UNSPECIFIED: ICD-10-CM
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
## 2025-04-08 DIAGNOSIS — R13.10 DYSPHAGIA, UNSPECIFIED: ICD-10-CM

## 2025-04-08 PROCEDURE — 36415 COLL VENOUS BLD VENIPUNCTURE: CPT

## 2025-04-08 PROCEDURE — 93000 ELECTROCARDIOGRAM COMPLETE: CPT

## 2025-04-08 PROCEDURE — 99214 OFFICE O/P EST MOD 30 MIN: CPT

## 2025-04-10 ENCOUNTER — APPOINTMENT (OUTPATIENT)
Dept: CARDIOLOGY | Facility: CLINIC | Age: 89
End: 2025-04-10
Payer: MEDICARE

## 2025-04-10 VITALS
SYSTOLIC BLOOD PRESSURE: 140 MMHG | OXYGEN SATURATION: 95 % | BODY MASS INDEX: 25.95 KG/M2 | HEIGHT: 64 IN | WEIGHT: 152 LBS | DIASTOLIC BLOOD PRESSURE: 56 MMHG | HEART RATE: 62 BPM

## 2025-04-10 DIAGNOSIS — E78.00 PURE HYPERCHOLESTEROLEMIA, UNSPECIFIED: ICD-10-CM

## 2025-04-10 DIAGNOSIS — R00.1 BRADYCARDIA, UNSPECIFIED: ICD-10-CM

## 2025-04-10 DIAGNOSIS — I65.23 OCCLUSION AND STENOSIS OF BILATERAL CAROTID ARTERIES: ICD-10-CM

## 2025-04-10 DIAGNOSIS — I10 ESSENTIAL (PRIMARY) HYPERTENSION: ICD-10-CM

## 2025-04-10 DIAGNOSIS — R55 SYNCOPE AND COLLAPSE: ICD-10-CM

## 2025-04-10 DIAGNOSIS — I77.9 DISORDER OF ARTERIES AND ARTERIOLES, UNSPECIFIED: ICD-10-CM

## 2025-04-10 LAB
ALBUMIN SERPL ELPH-MCNC: 4 G/DL
ALP BLD-CCNC: 92 U/L
ALT SERPL-CCNC: 14 U/L
ANION GAP SERPL CALC-SCNC: 12 MMOL/L
AST SERPL-CCNC: 21 U/L
BASOPHILS # BLD AUTO: 0.05 K/UL
BASOPHILS NFR BLD AUTO: 0.8 %
BILIRUB SERPL-MCNC: 0.8 MG/DL
BUN SERPL-MCNC: 26 MG/DL
CALCIUM SERPL-MCNC: 9 MG/DL
CHLORIDE SERPL-SCNC: 106 MMOL/L
CHOLEST SERPL-MCNC: 157 MG/DL
CO2 SERPL-SCNC: 27 MMOL/L
CREAT SERPL-MCNC: 1.18 MG/DL
EGFRCR SERPLBLD CKD-EPI 2021: 42 ML/MIN/1.73M2
EOSINOPHIL # BLD AUTO: 0.4 K/UL
EOSINOPHIL NFR BLD AUTO: 6 %
ESTIMATED AVERAGE GLUCOSE: 134 MG/DL
FERRITIN SERPL-MCNC: 50 NG/ML
GLUCOSE SERPL-MCNC: 111 MG/DL
HBA1C MFR BLD HPLC: 6.3 %
HCT VFR BLD CALC: 40.6 %
HDLC SERPL-MCNC: 84 MG/DL
HGB BLD-MCNC: 12.1 G/DL
IMM GRANULOCYTES NFR BLD AUTO: 0 %
IRON SATN MFR SERPL: 28 %
IRON SERPL-MCNC: 82 UG/DL
LDLC SERPL-MCNC: 61 MG/DL
LYMPHOCYTES # BLD AUTO: 0.98 K/UL
LYMPHOCYTES NFR BLD AUTO: 14.7 %
MAN DIFF?: NORMAL
MCHC RBC-ENTMCNC: 27.8 PG
MCHC RBC-ENTMCNC: 29.8 G/DL
MCV RBC AUTO: 93.1 FL
MONOCYTES # BLD AUTO: 0.59 K/UL
MONOCYTES NFR BLD AUTO: 8.9 %
NEUTROPHILS # BLD AUTO: 4.63 K/UL
NEUTROPHILS NFR BLD AUTO: 69.6 %
NONHDLC SERPL-MCNC: 73 MG/DL
PLATELET # BLD AUTO: 241 K/UL
POTASSIUM SERPL-SCNC: 4.5 MMOL/L
PROT SERPL-MCNC: 6.5 G/DL
RBC # BLD: 4.36 M/UL
RBC # FLD: 17.5 %
SODIUM SERPL-SCNC: 145 MMOL/L
TIBC SERPL-MCNC: 293 UG/DL
TRANSFERRIN SERPL-MCNC: 244 MG/DL
TRIGL SERPL-MCNC: 59 MG/DL
TSH SERPL-ACNC: 2.93 UIU/ML
UIBC SERPL-MCNC: 211 UG/DL
URATE SERPL-MCNC: 2.5 MG/DL
WBC # FLD AUTO: 6.65 K/UL

## 2025-04-10 PROCEDURE — G2211 COMPLEX E/M VISIT ADD ON: CPT

## 2025-04-10 PROCEDURE — 99204 OFFICE O/P NEW MOD 45 MIN: CPT

## 2025-04-22 ENCOUNTER — APPOINTMENT (OUTPATIENT)
Dept: INTERNAL MEDICINE | Facility: CLINIC | Age: 89
End: 2025-04-22
Payer: MEDICARE

## 2025-04-22 VITALS
DIASTOLIC BLOOD PRESSURE: 60 MMHG | BODY MASS INDEX: 25.95 KG/M2 | OXYGEN SATURATION: 96 % | HEIGHT: 64 IN | WEIGHT: 152 LBS | HEART RATE: 68 BPM | SYSTOLIC BLOOD PRESSURE: 131 MMHG

## 2025-04-22 DIAGNOSIS — I10 ESSENTIAL (PRIMARY) HYPERTENSION: ICD-10-CM

## 2025-04-22 DIAGNOSIS — E03.9 HYPOTHYROIDISM, UNSPECIFIED: ICD-10-CM

## 2025-04-22 DIAGNOSIS — K21.9 GASTRO-ESOPHAGEAL REFLUX DISEASE W/OUT ESOPHAGITIS: ICD-10-CM

## 2025-04-22 DIAGNOSIS — R13.19 OTHER DYSPHAGIA: ICD-10-CM

## 2025-04-22 DIAGNOSIS — I11.9 HYPERTENSIVE HEART DISEASE W/OUT HEART FAILURE: ICD-10-CM

## 2025-04-22 DIAGNOSIS — E11.9 TYPE 2 DIABETES MELLITUS W/OUT COMPLICATIONS: ICD-10-CM

## 2025-04-22 PROCEDURE — 99204 OFFICE O/P NEW MOD 45 MIN: CPT

## 2025-04-22 RX ORDER — PANTOPRAZOLE 40 MG/1
40 TABLET, DELAYED RELEASE ORAL DAILY
Qty: 90 | Refills: 0 | Status: ACTIVE | COMMUNITY
Start: 2025-04-22 | End: 1900-01-01

## 2025-05-15 ENCOUNTER — RX RENEWAL (OUTPATIENT)
Age: 89
End: 2025-05-15

## 2025-07-14 ENCOUNTER — APPOINTMENT (OUTPATIENT)
Dept: CARDIOLOGY | Facility: CLINIC | Age: 89
End: 2025-07-14

## 2025-07-29 NOTE — ED ADULT NURSE NOTE - NS ED NURSE RECORD ANOTHER VITAL SIGN
Yes
Render In Strict Bullet Format?: No
Continue Regimen: DermOtic Oil 0.01 % ear drops - Instill 5 gtt into ears BID for 7-14 days\\n\\ntriamcinolone acetonide 0.1 % topical cream - Apply to rash on torso/extremities twice daily for up to 2 weeks/month as needed\\n\\nketoconazole 2 % shampoo - Wash scalp three times weekly.\\n\\nDerma-Smoothe/FS Body Oil 0.01 % - Apply to scalp once nightly.
Detail Level: Zone
Plan: Biologic

## 2025-08-11 ENCOUNTER — RX RENEWAL (OUTPATIENT)
Age: 89
End: 2025-08-11

## 2025-09-18 ENCOUNTER — RX RENEWAL (OUTPATIENT)
Age: 89
End: 2025-09-18

## (undated) DEVICE — SYR ALLIANCE II INFLATION 60ML

## (undated) DEVICE — SYR LUER LOK 50CC

## (undated) DEVICE — CATH IV SAFE BC 22G X 1" (BLUE)

## (undated) DEVICE — CATH IV SAFE BC 20G X 1.16" (PINK)

## (undated) DEVICE — TUBING SUCTION 20FT

## (undated) DEVICE — SENSOR O2 FINGER ADULT

## (undated) DEVICE — POLY TRAP ETRAP

## (undated) DEVICE — TUBING SUCTION CONN 6FT STERILE

## (undated) DEVICE — BALLOON US ENDO

## (undated) DEVICE — PACK IV START WITH CHG

## (undated) DEVICE — BRUSH COLONOSCOPY CYTOLOGY

## (undated) DEVICE — SUCTION YANKAUER NO CONTROL VENT

## (undated) DEVICE — CLAMP BX HOT RAD JAW 3

## (undated) DEVICE — BIOPSY FORCEP RADIAL JAW 4 STANDARD WITH NEEDLE

## (undated) DEVICE — SOL INJ NS 0.9% 500ML 2 PORT

## (undated) DEVICE — IRRIGATOR BIO SHIELD

## (undated) DEVICE — ELCTR GROUNDING PAD ADULT COVIDIEN

## (undated) DEVICE — BITE BLOCK ADULT 20 X 27MM (GREEN)

## (undated) DEVICE — FORCEP RADIAL JAW 4 JUMBO 2.8MM 3.2MM 240CM ORANGE DISP

## (undated) DEVICE — FOLEY HOLDER STATLOCK 2 WAY ADULT

## (undated) DEVICE — TUBING IV SET GRAVITY 3Y 100" MACRO